# Patient Record
Sex: MALE | Race: WHITE | HISPANIC OR LATINO | URBAN - METROPOLITAN AREA
[De-identification: names, ages, dates, MRNs, and addresses within clinical notes are randomized per-mention and may not be internally consistent; named-entity substitution may affect disease eponyms.]

---

## 2021-01-12 ENCOUNTER — IMMUNIZATION (OUTPATIENT)
Dept: INTERNAL MEDICINE | Facility: CLINIC | Age: 81
End: 2021-01-12
Payer: MEDICAID

## 2021-01-12 DIAGNOSIS — Z23 NEED FOR VACCINATION: ICD-10-CM

## 2021-01-12 PROCEDURE — 91300 COVID-19, MRNA, LNP-S, PF, 30 MCG/0.3 ML DOSE VACCINE: CPT | Mod: PBBFAC | Performed by: INTERNAL MEDICINE

## 2021-02-02 ENCOUNTER — IMMUNIZATION (OUTPATIENT)
Dept: INTERNAL MEDICINE | Facility: CLINIC | Age: 81
End: 2021-02-02
Payer: MEDICAID

## 2021-02-02 DIAGNOSIS — Z23 NEED FOR VACCINATION: Primary | ICD-10-CM

## 2021-02-02 PROCEDURE — 91300 COVID-19, MRNA, LNP-S, PF, 30 MCG/0.3 ML DOSE VACCINE: ICD-10-PCS | Mod: S$GLB,,, | Performed by: INTERNAL MEDICINE

## 2021-02-02 PROCEDURE — 0002A COVID-19, MRNA, LNP-S, PF, 30 MCG/0.3 ML DOSE VACCINE: CPT | Mod: CV19,S$GLB,, | Performed by: INTERNAL MEDICINE

## 2021-02-02 PROCEDURE — 0002A COVID-19, MRNA, LNP-S, PF, 30 MCG/0.3 ML DOSE VACCINE: ICD-10-PCS | Mod: CV19,S$GLB,, | Performed by: INTERNAL MEDICINE

## 2021-02-02 PROCEDURE — 91300 COVID-19, MRNA, LNP-S, PF, 30 MCG/0.3 ML DOSE VACCINE: CPT | Mod: S$GLB,,, | Performed by: INTERNAL MEDICINE

## 2022-10-11 ENCOUNTER — HOSPITAL ENCOUNTER (INPATIENT)
Facility: HOSPITAL | Age: 82
LOS: 8 days | Discharge: HOSPICE | DRG: 193 | End: 2022-10-20
Attending: EMERGENCY MEDICINE | Admitting: HOSPITALIST
Payer: MEDICARE

## 2022-10-11 DIAGNOSIS — R41.82 AMS (ALTERED MENTAL STATUS): ICD-10-CM

## 2022-10-11 DIAGNOSIS — R79.89 ELEVATED TROPONIN: ICD-10-CM

## 2022-10-11 DIAGNOSIS — J18.9 HCAP (HEALTHCARE-ASSOCIATED PNEUMONIA): ICD-10-CM

## 2022-10-11 DIAGNOSIS — R94.31 QT PROLONGATION: ICD-10-CM

## 2022-10-11 DIAGNOSIS — G93.49 ENCEPHALOPATHY DUE TO INFECTION: Primary | ICD-10-CM

## 2022-10-11 DIAGNOSIS — B99.9 ENCEPHALOPATHY DUE TO INFECTION: Primary | ICD-10-CM

## 2022-10-11 DIAGNOSIS — I49.3 PVC (PREMATURE VENTRICULAR CONTRACTION): ICD-10-CM

## 2022-10-11 DIAGNOSIS — J18.9 PNEUMONIA: ICD-10-CM

## 2022-10-11 LAB
ALBUMIN SERPL BCP-MCNC: 3.6 G/DL (ref 3.5–5.2)
ALP SERPL-CCNC: 111 U/L (ref 55–135)
ALT SERPL W/O P-5'-P-CCNC: 23 U/L (ref 10–44)
ANION GAP SERPL CALC-SCNC: 11 MMOL/L (ref 8–16)
AST SERPL-CCNC: 29 U/L (ref 10–40)
BACTERIA #/AREA URNS HPF: ABNORMAL /HPF
BASOPHILS # BLD AUTO: 0.03 K/UL (ref 0–0.2)
BASOPHILS NFR BLD: 0.2 % (ref 0–1.9)
BILIRUB SERPL-MCNC: 0.7 MG/DL (ref 0.1–1)
BILIRUB UR QL STRIP: NEGATIVE
BUN SERPL-MCNC: 53 MG/DL (ref 8–23)
CALCIUM SERPL-MCNC: 10.9 MG/DL (ref 8.7–10.5)
CHLORIDE SERPL-SCNC: 113 MMOL/L (ref 95–110)
CLARITY UR: ABNORMAL
CO2 SERPL-SCNC: 17 MMOL/L (ref 23–29)
COLOR UR: YELLOW
CREAT SERPL-MCNC: 2.2 MG/DL (ref 0.5–1.4)
DIFFERENTIAL METHOD: ABNORMAL
EOSINOPHIL # BLD AUTO: 0 K/UL (ref 0–0.5)
EOSINOPHIL NFR BLD: 0.2 % (ref 0–8)
ERYTHROCYTE [DISTWIDTH] IN BLOOD BY AUTOMATED COUNT: 15.2 % (ref 11.5–14.5)
EST. GFR  (NO RACE VARIABLE): 30 ML/MIN/1.73 M^2
GLUCOSE SERPL-MCNC: 105 MG/DL (ref 70–110)
GLUCOSE UR QL STRIP: NEGATIVE
HCT VFR BLD AUTO: 39.6 % (ref 40–54)
HGB BLD-MCNC: 12.1 G/DL (ref 14–18)
HGB UR QL STRIP: ABNORMAL
HYALINE CASTS #/AREA URNS LPF: 0 /LPF
IMM GRANULOCYTES # BLD AUTO: 0.08 K/UL (ref 0–0.04)
IMM GRANULOCYTES NFR BLD AUTO: 0.5 % (ref 0–0.5)
INFLUENZA A, MOLECULAR: NEGATIVE
INFLUENZA B, MOLECULAR: NEGATIVE
KETONES UR QL STRIP: ABNORMAL
LEUKOCYTE ESTERASE UR QL STRIP: ABNORMAL
LYMPHOCYTES # BLD AUTO: 2 K/UL (ref 1–4.8)
LYMPHOCYTES NFR BLD: 11.9 % (ref 18–48)
MCH RBC QN AUTO: 32.4 PG (ref 27–31)
MCHC RBC AUTO-ENTMCNC: 30.6 G/DL (ref 32–36)
MCV RBC AUTO: 106 FL (ref 82–98)
MICROSCOPIC COMMENT: ABNORMAL
MONOCYTES # BLD AUTO: 1.3 K/UL (ref 0.3–1)
MONOCYTES NFR BLD: 7.7 % (ref 4–15)
NEUTROPHILS # BLD AUTO: 13.4 K/UL (ref 1.8–7.7)
NEUTROPHILS NFR BLD: 79.5 % (ref 38–73)
NITRITE UR QL STRIP: NEGATIVE
NRBC BLD-RTO: 0 /100 WBC
PH UR STRIP: 7 [PH] (ref 5–8)
PLATELET # BLD AUTO: 386 K/UL (ref 150–450)
PMV BLD AUTO: 9.4 FL (ref 9.2–12.9)
POTASSIUM SERPL-SCNC: 5.2 MMOL/L (ref 3.5–5.1)
PROT SERPL-MCNC: 6.9 G/DL (ref 6–8.4)
PROT UR QL STRIP: ABNORMAL
RBC # BLD AUTO: 3.73 M/UL (ref 4.6–6.2)
RBC #/AREA URNS HPF: >100 /HPF (ref 0–4)
SODIUM SERPL-SCNC: 141 MMOL/L (ref 136–145)
SP GR UR STRIP: 1.01 (ref 1–1.03)
SPECIMEN SOURCE: NORMAL
TROPONIN I SERPL DL<=0.01 NG/ML-MCNC: 0.12 NG/ML (ref 0–0.03)
TSH SERPL DL<=0.005 MIU/L-ACNC: 1.41 UIU/ML (ref 0.4–4)
URN SPEC COLLECT METH UR: ABNORMAL
UROBILINOGEN UR STRIP-ACNC: NEGATIVE EU/DL
WBC # BLD AUTO: 16.91 K/UL (ref 3.9–12.7)
WBC #/AREA URNS HPF: >100 /HPF (ref 0–5)

## 2022-10-11 PROCEDURE — G0378 HOSPITAL OBSERVATION PER HR: HCPCS

## 2022-10-11 PROCEDURE — 84443 ASSAY THYROID STIM HORMONE: CPT | Performed by: EMERGENCY MEDICINE

## 2022-10-11 PROCEDURE — 87088 URINE BACTERIA CULTURE: CPT | Performed by: EMERGENCY MEDICINE

## 2022-10-11 PROCEDURE — 93005 ELECTROCARDIOGRAM TRACING: CPT

## 2022-10-11 PROCEDURE — 85025 COMPLETE CBC W/AUTO DIFF WBC: CPT | Performed by: EMERGENCY MEDICINE

## 2022-10-11 PROCEDURE — 87502 INFLUENZA DNA AMP PROBE: CPT | Mod: 59

## 2022-10-11 PROCEDURE — 87077 CULTURE AEROBIC IDENTIFY: CPT | Performed by: EMERGENCY MEDICINE

## 2022-10-11 PROCEDURE — 87040 BLOOD CULTURE FOR BACTERIA: CPT | Performed by: EMERGENCY MEDICINE

## 2022-10-11 PROCEDURE — 93010 ELECTROCARDIOGRAM REPORT: CPT | Mod: 76,,, | Performed by: INTERNAL MEDICINE

## 2022-10-11 PROCEDURE — 96374 THER/PROPH/DIAG INJ IV PUSH: CPT

## 2022-10-11 PROCEDURE — 93010 ELECTROCARDIOGRAM REPORT: CPT | Mod: ,,, | Performed by: INTERNAL MEDICINE

## 2022-10-11 PROCEDURE — 93010 EKG 12-LEAD: ICD-10-PCS | Mod: ,,, | Performed by: INTERNAL MEDICINE

## 2022-10-11 PROCEDURE — 81000 URINALYSIS NONAUTO W/SCOPE: CPT | Performed by: EMERGENCY MEDICINE

## 2022-10-11 PROCEDURE — 84484 ASSAY OF TROPONIN QUANT: CPT | Performed by: EMERGENCY MEDICINE

## 2022-10-11 PROCEDURE — 87186 SC STD MICRODIL/AGAR DIL: CPT | Performed by: EMERGENCY MEDICINE

## 2022-10-11 PROCEDURE — 99285 EMERGENCY DEPT VISIT HI MDM: CPT | Mod: 25

## 2022-10-11 PROCEDURE — 83605 ASSAY OF LACTIC ACID: CPT | Performed by: EMERGENCY MEDICINE

## 2022-10-11 PROCEDURE — 25000003 PHARM REV CODE 250: Performed by: EMERGENCY MEDICINE

## 2022-10-11 PROCEDURE — 80053 COMPREHEN METABOLIC PANEL: CPT | Performed by: EMERGENCY MEDICINE

## 2022-10-11 PROCEDURE — 87502 INFLUENZA DNA AMP PROBE: CPT | Performed by: EMERGENCY MEDICINE

## 2022-10-11 PROCEDURE — 87086 URINE CULTURE/COLONY COUNT: CPT | Performed by: EMERGENCY MEDICINE

## 2022-10-11 RX ORDER — CLINDAMYCIN PHOSPHATE 600 MG/50ML
600 INJECTION, SOLUTION INTRAVENOUS
Status: DISCONTINUED | OUTPATIENT
Start: 2022-10-11 | End: 2022-10-12

## 2022-10-11 RX ADMIN — SODIUM CHLORIDE 1000 ML: 0.9 INJECTION, SOLUTION INTRAVENOUS at 11:10

## 2022-10-12 PROBLEM — I10 HYPERTENSION: Status: ACTIVE | Noted: 2022-10-12

## 2022-10-12 PROBLEM — F31.9 BIPOLAR DISORDER: Status: ACTIVE | Noted: 2022-10-12

## 2022-10-12 PROBLEM — Z86.73 HISTORY OF CVA (CEREBROVASCULAR ACCIDENT): Status: ACTIVE | Noted: 2022-10-12

## 2022-10-12 PROBLEM — G93.49 ENCEPHALOPATHY DUE TO INFECTION: Status: ACTIVE | Noted: 2022-10-12

## 2022-10-12 PROBLEM — J18.9 HCAP (HEALTHCARE-ASSOCIATED PNEUMONIA): Status: ACTIVE | Noted: 2022-10-12

## 2022-10-12 PROBLEM — I48.91 A-FIB: Status: ACTIVE | Noted: 2022-10-12

## 2022-10-12 PROBLEM — R79.89 ELEVATED TROPONIN: Status: ACTIVE | Noted: 2022-10-12

## 2022-10-12 PROBLEM — D72.829 LEUKOCYTOSIS: Status: ACTIVE | Noted: 2022-10-12

## 2022-10-12 PROBLEM — I25.10 CAD (CORONARY ARTERY DISEASE): Status: ACTIVE | Noted: 2022-10-12

## 2022-10-12 PROBLEM — Z86.711 HISTORY OF PULMONARY EMBOLISM: Status: ACTIVE | Noted: 2022-10-12

## 2022-10-12 PROBLEM — R45.851 SUICIDAL IDEATION: Status: ACTIVE | Noted: 2022-10-12

## 2022-10-12 PROBLEM — I50.9 CONGESTIVE HEART FAILURE: Status: ACTIVE | Noted: 2022-10-12

## 2022-10-12 PROBLEM — B99.9 ENCEPHALOPATHY DUE TO INFECTION: Status: ACTIVE | Noted: 2022-10-12

## 2022-10-12 PROBLEM — N39.0 UTI (URINARY TRACT INFECTION): Status: ACTIVE | Noted: 2022-10-12

## 2022-10-12 LAB
AMMONIA PLAS-SCNC: 26 UMOL/L (ref 10–50)
AV INDEX (PROSTH): 0.41
AV MEAN GRADIENT: 11 MMHG
AV PEAK GRADIENT: 19 MMHG
AV VALVE AREA: 1.36 CM2
AV VELOCITY RATIO: 0.37
BSA FOR ECHO PROCEDURE: 1.76 M2
CV ECHO LV RWT: 0.43 CM
DOP CALC AO PEAK VEL: 2.16 M/S
DOP CALC AO VTI: 34.7 CM
DOP CALC LVOT AREA: 3.3 CM2
DOP CALC LVOT DIAMETER: 2.06 CM
DOP CALC LVOT PEAK VEL: 0.79 M/S
DOP CALC LVOT STROKE VOLUME: 47.3 CM3
DOP CALC MV VTI: 37.7 CM
DOP CALCLVOT PEAK VEL VTI: 14.2 CM
ECHO LV POSTERIOR WALL: 0.91 CM (ref 0.6–1.1)
EJECTION FRACTION: 55 %
FRACTIONAL SHORTENING: 35 % (ref 28–44)
INTERVENTRICULAR SEPTUM: 0.66 CM (ref 0.6–1.1)
IVRT: 125.59 MSEC
LA MAJOR: 6.07 CM
LA MINOR: 6.63 CM
LA WIDTH: 3.6 CM
LACTATE SERPL-SCNC: 1 MMOL/L (ref 0.5–2.2)
LEFT ATRIUM SIZE: 3.17 CM
LEFT ATRIUM VOLUME INDEX MOD: 36.7 ML/M2
LEFT ATRIUM VOLUME INDEX: 34.9 ML/M2
LEFT ATRIUM VOLUME MOD: 64.61 CM3
LEFT ATRIUM VOLUME: 61.48 CM3
LEFT INTERNAL DIMENSION IN SYSTOLE: 2.75 CM (ref 2.1–4)
LEFT VENTRICLE DIASTOLIC VOLUME INDEX: 46.05 ML/M2
LEFT VENTRICLE DIASTOLIC VOLUME: 81.04 ML
LEFT VENTRICLE MASS INDEX: 57 G/M2
LEFT VENTRICLE SYSTOLIC VOLUME INDEX: 16 ML/M2
LEFT VENTRICLE SYSTOLIC VOLUME: 28.17 ML
LEFT VENTRICULAR INTERNAL DIMENSION IN DIASTOLE: 4.26 CM (ref 3.5–6)
LEFT VENTRICULAR MASS: 101.14 G
LITHIUM SERPL-SCNC: 1.1 MMOL/L (ref 0.6–1.2)
LVOT MG: 1.59 MMHG
LVOT MV: 0.61 CM/S
MAGNESIUM SERPL-MCNC: 2.3 MG/DL (ref 1.6–2.6)
MV MEAN GRADIENT: 2 MMHG
MV PEAK GRADIENT: 6 MMHG
MV VALVE AREA BY CONTINUITY EQUATION: 1.25 CM2
PHOSPHATE SERPL-MCNC: 3.5 MG/DL (ref 2.7–4.5)
PISA TR MAX VEL: 2.72 M/S
PULM VEIN S/D RATIO: 1.06
PV PEAK D VEL: 0.31 M/S
PV PEAK S VEL: 0.33 M/S
RA MAJOR: 6.05 CM
RA PRESSURE: 15 MMHG
RA WIDTH: 3.7 CM
RIGHT VENTRICULAR END-DIASTOLIC DIMENSION: 2.79 CM
RV TISSUE DOPPLER FREE WALL SYSTOLIC VELOCITY 1 (APICAL 4 CHAMBER VIEW): 0.01 CM/S
STJ: 2.9 CM
TR MAX PG: 30 MMHG
TROPONIN I SERPL DL<=0.01 NG/ML-MCNC: 0.09 NG/ML (ref 0–0.03)
TROPONIN I SERPL DL<=0.01 NG/ML-MCNC: 0.1 NG/ML (ref 0–0.03)
TV REST PULMONARY ARTERY PRESSURE: 45 MMHG

## 2022-10-12 PROCEDURE — U0002 COVID-19 LAB TEST NON-CDC: HCPCS | Performed by: NURSE PRACTITIONER

## 2022-10-12 PROCEDURE — 92610 EVALUATE SWALLOWING FUNCTION: CPT

## 2022-10-12 PROCEDURE — 36415 COLL VENOUS BLD VENIPUNCTURE: CPT | Performed by: NURSE PRACTITIONER

## 2022-10-12 PROCEDURE — 99223 PR INITIAL HOSPITAL CARE,LEVL III: ICD-10-PCS | Mod: ,,, | Performed by: PSYCHIATRY & NEUROLOGY

## 2022-10-12 PROCEDURE — 11000001 HC ACUTE MED/SURG PRIVATE ROOM

## 2022-10-12 PROCEDURE — 25000003 PHARM REV CODE 250: Performed by: NURSE PRACTITIONER

## 2022-10-12 PROCEDURE — 80178 ASSAY OF LITHIUM: CPT | Performed by: PSYCHIATRY & NEUROLOGY

## 2022-10-12 PROCEDURE — 83735 ASSAY OF MAGNESIUM: CPT | Performed by: NURSE PRACTITIONER

## 2022-10-12 PROCEDURE — 84484 ASSAY OF TROPONIN QUANT: CPT | Mod: 91 | Performed by: NURSE PRACTITIONER

## 2022-10-12 PROCEDURE — 99900035 HC TECH TIME PER 15 MIN (STAT)

## 2022-10-12 PROCEDURE — 99223 1ST HOSP IP/OBS HIGH 75: CPT | Mod: ,,, | Performed by: PSYCHIATRY & NEUROLOGY

## 2022-10-12 PROCEDURE — 63600175 PHARM REV CODE 636 W HCPCS: Performed by: NURSE PRACTITIONER

## 2022-10-12 PROCEDURE — 94761 N-INVAS EAR/PLS OXIMETRY MLT: CPT

## 2022-10-12 PROCEDURE — 36415 COLL VENOUS BLD VENIPUNCTURE: CPT | Performed by: PSYCHIATRY & NEUROLOGY

## 2022-10-12 PROCEDURE — 84100 ASSAY OF PHOSPHORUS: CPT | Performed by: NURSE PRACTITIONER

## 2022-10-12 PROCEDURE — 63600175 PHARM REV CODE 636 W HCPCS: Performed by: EMERGENCY MEDICINE

## 2022-10-12 PROCEDURE — 82140 ASSAY OF AMMONIA: CPT | Performed by: PSYCHIATRY & NEUROLOGY

## 2022-10-12 PROCEDURE — 90833 PR PSYCHOTHERAPY W/PATIENT W/E&M, 30 MIN (ADD ON): ICD-10-PCS | Mod: ,,, | Performed by: PSYCHIATRY & NEUROLOGY

## 2022-10-12 PROCEDURE — 90833 PSYTX W PT W E/M 30 MIN: CPT | Mod: ,,, | Performed by: PSYCHIATRY & NEUROLOGY

## 2022-10-12 RX ORDER — MULTIVITAMIN
1 TABLET ORAL DAILY
COMMUNITY

## 2022-10-12 RX ORDER — FINASTERIDE 5 MG/1
5 TABLET, FILM COATED ORAL DAILY
COMMUNITY

## 2022-10-12 RX ORDER — TRAZODONE HYDROCHLORIDE 50 MG/1
25 TABLET ORAL NIGHTLY
COMMUNITY

## 2022-10-12 RX ORDER — ATORVASTATIN CALCIUM 80 MG/1
80 TABLET, FILM COATED ORAL DAILY
COMMUNITY

## 2022-10-12 RX ORDER — LISINOPRIL 40 MG/1
40 TABLET ORAL DAILY
Status: ON HOLD | COMMUNITY
End: 2022-10-20 | Stop reason: HOSPADM

## 2022-10-12 RX ORDER — LANOLIN ALCOHOL/MO/W.PET/CERES
100 CREAM (GRAM) TOPICAL DAILY
COMMUNITY

## 2022-10-12 RX ORDER — DULOXETIN HYDROCHLORIDE 20 MG/1
40 CAPSULE, DELAYED RELEASE ORAL DAILY
COMMUNITY

## 2022-10-12 RX ORDER — NITROGLYCERIN 0.4 MG/1
0.4 TABLET SUBLINGUAL EVERY 5 MIN PRN
COMMUNITY

## 2022-10-12 RX ORDER — ADHESIVE BANDAGE
30 BANDAGE TOPICAL DAILY PRN
COMMUNITY

## 2022-10-12 RX ORDER — MAG HYDROX/ALUMINUM HYD/SIMETH 200-200-20
30 SUSPENSION, ORAL (FINAL DOSE FORM) ORAL EVERY 4 HOURS PRN
COMMUNITY

## 2022-10-12 RX ORDER — CALCIUM CARB/MAGNESIUM CARB 311-232MG
15 TABLET ORAL NIGHTLY
COMMUNITY

## 2022-10-12 RX ORDER — SODIUM CHLORIDE 9 MG/ML
INJECTION, SOLUTION INTRAVENOUS CONTINUOUS
Status: DISCONTINUED | OUTPATIENT
Start: 2022-10-12 | End: 2022-10-13

## 2022-10-12 RX ORDER — IPRATROPIUM BROMIDE AND ALBUTEROL SULFATE 2.5; .5 MG/3ML; MG/3ML
3 SOLUTION RESPIRATORY (INHALATION) EVERY 4 HOURS PRN
Status: DISCONTINUED | OUTPATIENT
Start: 2022-10-12 | End: 2022-10-20 | Stop reason: HOSPADM

## 2022-10-12 RX ORDER — DEXTROMETHORPHAN HYDROBROMIDE, GUAIFENESIN 5; 100 MG/5ML; MG/5ML
650 LIQUID ORAL 4 TIMES DAILY PRN
COMMUNITY

## 2022-10-12 RX ORDER — NITROFURANTOIN 25; 75 MG/1; MG/1
100 CAPSULE ORAL 2 TIMES DAILY
Status: ON HOLD | COMMUNITY
End: 2022-10-13 | Stop reason: ALTCHOICE

## 2022-10-12 RX ORDER — SPIRONOLACTONE 25 MG/1
25 TABLET ORAL DAILY
Status: ON HOLD | COMMUNITY
End: 2022-10-20 | Stop reason: HOSPADM

## 2022-10-12 RX ORDER — AZITHROMYCIN 250 MG/1
500 TABLET, FILM COATED ORAL DAILY
Status: DISCONTINUED | OUTPATIENT
Start: 2022-10-12 | End: 2022-10-13

## 2022-10-12 RX ORDER — OLANZAPINE 10 MG/2ML
2.5 INJECTION, POWDER, FOR SOLUTION INTRAMUSCULAR EVERY 8 HOURS PRN
Status: DISCONTINUED | OUTPATIENT
Start: 2022-10-12 | End: 2022-10-20 | Stop reason: HOSPADM

## 2022-10-12 RX ORDER — CLINDAMYCIN PHOSPHATE 600 MG/50ML
600 INJECTION, SOLUTION INTRAVENOUS
Status: DISCONTINUED | OUTPATIENT
Start: 2022-10-12 | End: 2022-10-12

## 2022-10-12 RX ORDER — METOPROLOL SUCCINATE 25 MG/1
25 TABLET, EXTENDED RELEASE ORAL DAILY
COMMUNITY

## 2022-10-12 RX ADMIN — SODIUM CHLORIDE: 0.9 INJECTION, SOLUTION INTRAVENOUS at 12:10

## 2022-10-12 RX ADMIN — CLINDAMYCIN IN 5 PERCENT DEXTROSE 600 MG: 12 INJECTION, SOLUTION INTRAVENOUS at 06:10

## 2022-10-12 RX ADMIN — CLINDAMYCIN IN 5 PERCENT DEXTROSE 600 MG: 12 INJECTION, SOLUTION INTRAVENOUS at 12:10

## 2022-10-12 RX ADMIN — CEFTRIAXONE 1 G: 1 INJECTION, SOLUTION INTRAVENOUS at 11:10

## 2022-10-12 RX ADMIN — CEFTRIAXONE 1 G: 1 INJECTION, SOLUTION INTRAVENOUS at 12:10

## 2022-10-12 RX ADMIN — SODIUM CHLORIDE: 0.9 INJECTION, SOLUTION INTRAVENOUS at 01:10

## 2022-10-12 NOTE — CARE UPDATE
Patient seen this am. No resp distress noted. Sitter at the bedside. Home meds reconciled. Will hold all home meds 2/2 mental status change. Appreciate Psych's recs. Will follow.   Head CT reviewed.       Jasbir Liu, NP

## 2022-10-12 NOTE — ED PROVIDER NOTES
Encounter Date: 10/11/2022       History     Chief Complaint   Patient presents with    Altered Mental Status     Pt sent from [facility] for eval of AMS. Per Chani at [facility], pt sent for AMS, states that pt has been somnolent on today and had blood work that showed an ALICIA. Chani states that the doctor also noted abnormal lung sounds. Per Chani, pt baseline mentation is oriented to self and sometimes situation only. Pt currently a PEC for SI.       HPI  History limited secondary to patient condition, dementia and altered mentation.  This is a 81 y.o. male who has no past medical history on file.   The patient presents to the Emergency Department with altered mental status.   Patient unable to provide any history.  Per RN, patient normally oriented to self and now is somnolent.    Review of patient's allergies indicates:  No Known Allergies  No past medical history on file.  No past surgical history on file.  No family history on file.     Review of Systems   Unable to perform ROS: Mental status change     Physical Exam     Initial Vitals [10/11/22 2025]   BP Pulse Resp Temp SpO2   102/84 73 20 98 °F (36.7 °C) 99 %      MAP       --         Physical Exam    Nursing note and vitals reviewed.  Constitutional: He appears distressed.   HENT:   Head: Normocephalic and atraumatic.   Dry oropharynx   Eyes: Conjunctivae are normal. Pupils are equal, round, and reactive to light.   Neck: Neck supple.   Normal range of motion.  Cardiovascular:  Normal rate, regular rhythm, normal heart sounds and intact distal pulses.           Pulmonary/Chest: He is in respiratory distress. He has wheezes. He has rales.   Abdominal: Abdomen is soft. Bowel sounds are normal. He exhibits no distension. There is no abdominal tenderness.   Musculoskeletal:         General: No tenderness or edema. Normal range of motion.      Cervical back: Normal range of motion and neck supple.     Lymphadenopathy:     He has no cervical adenopathy.  "  Neurological: He is alert. He has normal strength. GCS score is 15. GCS eye subscore is 4. GCS verbal subscore is 5. GCS motor subscore is 6.   Disoriented, states name only  Follows some commands such as "take a deep breath"   Skin: Skin is warm and dry. Capillary refill takes less than 2 seconds. No rash noted. No erythema.   Psychiatric:   Unable to assess       ED Course   Procedures  Labs Reviewed   CBC W/ AUTO DIFFERENTIAL - Abnormal; Notable for the following components:       Result Value    WBC 16.91 (*)     RBC 3.73 (*)     Hemoglobin 12.1 (*)     Hematocrit 39.6 (*)      (*)     MCH 32.4 (*)     MCHC 30.6 (*)     RDW 15.2 (*)     Gran # (ANC) 13.4 (*)     Immature Grans (Abs) 0.08 (*)     Mono # 1.3 (*)     Gran % 79.5 (*)     Lymph % 11.9 (*)     All other components within normal limits   COMPREHENSIVE METABOLIC PANEL - Abnormal; Notable for the following components:    Potassium 5.2 (*)     Chloride 113 (*)     CO2 17 (*)     BUN 53 (*)     Creatinine 2.2 (*)     Calcium 10.9 (*)     eGFR 30 (*)     All other components within normal limits   TROPONIN I - Abnormal; Notable for the following components:    Troponin I 0.120 (*)     All other components within normal limits   URINALYSIS, REFLEX TO URINE CULTURE - Abnormal; Notable for the following components:    Appearance, UA Cloudy (*)     Protein, UA 1+ (*)     Ketones, UA Trace (*)     Occult Blood UA 3+ (*)     Leukocytes, UA 3+ (*)     All other components within normal limits    Narrative:     Specimen Source->Urine   URINALYSIS MICROSCOPIC - Abnormal; Notable for the following components:    RBC, UA >100 (*)     WBC, UA >100 (*)     Bacteria Moderate (*)     All other components within normal limits    Narrative:     Specimen Source->Urine   INFLUENZA A & B BY MOLECULAR   CULTURE, URINE   CULTURE, BLOOD   CULTURE, BLOOD   TSH     EKG Readings: (Independently Interpreted)   Initial Reading: No STEMI. Ectopy: PVCs. Axis: Left Axis " Deviation.   Paced rhythm at 70 bpm.  Underlying atrial fibrillation noted       Imaging Results              CT Head Without Contrast (Final result)  Result time 10/11/22 22:22:22      Final result by Bel Noguera MD (10/11/22 22:22:22)                   Impression:      No convincing acute intracranial hemorrhage, finding of major arterial acute infarct or mass effect.    Sinus disease in the left maxillary and left posterior ethmoid sinuses.    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    Nonspecific stable deep white matter low density as seen with microvascular chronic ischemic changes.    Please see above for additional incidental nonacute findings.      Electronically signed by: Bel Noguera  Date:    10/11/2022  Time:    22:22               Narrative:    EXAMINATION:  CT HEAD WITHOUT CONTRAST    CLINICAL HISTORY:  Mental status change, unknown cause;    TECHNIQUE:  Low dose axial images were obtained through the head.  Coronal and sagittal reformations were also performed. Contrast was not administered.    COMPARISON:  04/08/2015 CT brain.    FINDINGS:  There is no evidence of acute intracranial intra or extra-axial hemorrhage or hematoma.  The gray-white matter junction differentiation appears to be intact.  There is no focal mass or mass effect.  There is global prominence of the ventricles, cisterns and sulci as seen with senescent atrophic changes similar to prior exam.  Moderate confluent low density in the periventricular white matter is again noted as commonly seen with chronic microvascular ischemic changes.    Remote lacunar infarcts are again noted bilaterally in the basal ganglia.  No appreciable mass or mass effect is seen.  Remote left inferior cerebellar infarct again noted.    There is no evidence of hydrocephalus.    Prominent atherosclerotic calcifications are noted in the distal vertebral and basilar arteries and intracranial internal carotid  arteries bilaterally.    There is complete opacification of the left maxillary sinus and posterior left ethmoid air cells compatible with sinus disease.  Remaining sinuses are clear as are the mastoid air cells and middle ears bilaterally.  Orbital structures grossly appear intact as imaged.  There is motion artifact limiting evaluation through the skull base.  Grossly the bony calvarium is intact with no evidence of fracture.                                       X-Ray Chest AP Portable (Final result)  Result time 10/11/22 21:50:03      Final result by King Miranda MD (10/11/22 21:50:03)                   Impression:      Right basilar airspace opacity.      Electronically signed by: King Miranda MD  Date:    10/11/2022  Time:    21:50               Narrative:    EXAMINATION:  XR CHEST AP PORTABLE    CLINICAL HISTORY:  ams;    TECHNIQUE:  Single frontal view of the chest was performed.    COMPARISON:  04/08/2015.    FINDINGS:  There is a left-sided AICD with its tip overlying the left ventricle.  Monitoring EKG leads are present.    The trachea is unremarkable.  There are calcifications of the aortic knob.  The cardiomediastinal silhouette is within normal limits.  The hemidiaphragms are unremarkable.  There are no pleural effusions.  There is no evidence of a pneumothorax.  There is no evidence of pneumomediastinum.  There is a right basilar airspace opacity.  There are degenerative changes in the osseous structures.                                       Medications   cefTRIAXone (ROCEPHIN) 1 g/50 mL D5W IVPB (has no administration in time range)   clindamycin in D5W 600 mg/50 mL IVPB 600 mg (600 mg Intravenous New Bag 10/12/22 0052)   albuterol-ipratropium 2.5 mg-0.5 mg/3 mL nebulizer solution 3 mL (has no administration in time range)   0.9%  NaCl infusion ( Intravenous New Bag 10/12/22 0050)   cefTRIAXone (ROCEPHIN) 1 g/50 mL D5W IVPB (0 g Intravenous Stopped 10/12/22 0030)   sodium chloride 0.9% bolus 1,000 mL  (0 mLs Intravenous Stopped 10/12/22 0023)     Medical Decision Making:   Initial Assessment:   This is an emergent evaluation of a 81 y.o.male patient with presentation of altered mental status from [facility], on pec for SI.  Patient has rales and wheezing on exam of lungs, more audible on the left side..     Initial differentials include but are not limited to:  Pneumonia, UTI, dehydration, ALICIA, stroke, intracranial hemorrhage, intracranial mass.     Plan:  CBC, CMP, chest x-ray, UA, head CT            Attending Attestation:             Attending ED Notes:   Labs reviewed.  Leukocytosis noted.  ALICIA noted with hyperkalemia. +UTI  Chest x-ray reviewed.  Right basilar opacity, likely aspiration, Hcap  Head CT reviewed.  No acute abnormalities.  Antibiotics ordered for HCAP, UTI.  Will admit to  for further care.                 Clinical Impression:   Final diagnoses:  [R41.82] AMS (altered mental status)  [J18.9] HCAP (healthcare-associated pneumonia)        ED Disposition Condition    Observation                 Sen Cook MD  10/12/22 0059

## 2022-10-12 NOTE — MEDICAL/APP STUDENT
PSYCHIATRY INPATIENT CONSULT NOTE      10/12/2022 8:21 AM   Cheikh Yanez   1940   [id number]           DATE OF ADMISSION: 10/11/2022  8:28 PM    SITE: [facility]    CURRENT LEGAL STATUS: PEC      HISTORY    HPI Per Initial Primary Team  Chief Complaint   Patient presents with    Altered Mental Status       Pt sent from [facility] (formerly [facility]) for eval of AMS. Per Chani at [facility], pt sent for AMS, states that pt has been somnolent on today and had blood work that showed an ALICIA. Chani states that the doctor also noted abnormal lung sounds. Per Chani, pt baseline mentation is oriented to self and sometimes situation only. Pt currently a PEC for SI.           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 Patient unable to provide any history. Per RN, patient normally oriented to self and now is somnolent.  ED findings:  WBCs 16.91, potassium 5.2, creatinine 2.2, troponin 0.120, EKG without ischemic changes, u/a concerning for infectious process,  chest x-ray showed a right basilar airspace opacity, covid pending, blood cultures pending.  Patient admitted to hospital medicine observation unit for further medical management.         Chief Complaint / Reason for Psychiatry Consult: Suicidal Ideation       HPI   Cheikh Yanez is a 81 y.o. male with a past medical history of HFrEF 50%, Afib on eliquis, HTN, PE, CVA and a past psychiatric history of Bipolar 1 disorder, currently being treated by their inpatient primary treatment team for a principal problem of encephalopathy due to infection, healthcare-associated pneumonia and UTI.  Psychiatry was originally consulted as noted above.  The patient was seen and examined.  The chart was reviewed.  On examination today, the patient was alert and oriented to person and city. Unable to assess orientation  further and obtain history due to pt agitation and difficulty w/ speech. Unable to assess patient for delirium.The patient is a transfer from [facility] due to his medical issues. Psych ROS limited due above reason. Unable to assess for any adverse effects to their current medication regimen.  Unable to assess for medical/physical complains. NAD was observed during the examination.  After discussing the risks, benefits, alt vs no treatment, *** is agreeable and desiring a trial of *** in an effort to improve ***.     Collateral:  ***      Psychiatric Review Of Systems - Currently, the patient is endorsing and/or denying the following:   Unable to assess     Symptoms of Depression: diminished mood or loss of interest/anhedonia; irritability, diminished energy, change in sleep, change in appetite, diminished concentration or cognition or indecisiveness, PMA/R, excessive guilt or hopelessness or worthlessness, suicidal ideations    Sleep: initiation, maintenance, early morning awakening with inability to return to sleep    Suicidal/Homicidal ideations: active/passive ideations, organized plans, future intentions    Symptoms of psychosis: hallucinations, delusions, disorganized thinking, disorganized behavior or abnormal motor behavior, or negative symptoms (diminshed emotional expression, avolition, anhedonia, alogia, asociality     Symptoms of becky or hypomania: elevated, expansive, or irritable mood with increased energy or activity; with inflated self-esteem or grandiosity, decreased need for sleep, increased rate of speech, FOI or racing thoughts, distractibility, increased goal directed activity or PMA, risky/disinhibited behavior    Symptoms of BLAIR: excessive anxiety/worry/fear, more days than not, about numerous issues, difficult to control, with restlessness, fatigue, poor concentration, irritability, muscle tension, sleep disturbance; causes functionally impairing distress     Symptoms of Panic  "Disorder: recurrent panic attacks, precipitated or un-precipitated, source of worry and/or behavioral changes secondary; with or without agoraphobia    Symptoms of PTSD: h/o trauma; re-experiencing/intrusive symptoms, avoidant behavior, negative alterations in cognition or mood, or hyperarousal symptoms; with or without dissociative symptoms     Symptoms of OCD: obsessions or compulsions     Symptoms of Eating Disorders: anorexia, bulimia or binging    Substance Use: intoxication, withdrawal, tolerance, used in larger amounts or duration than intended, unsuccessful attempts to limit or quit, increased time engaging in or seeking out, cravings or strong desire to use, failure to fulfill obligations, negative consequences in social/interpersonal/occupational,/recreational areas, use in dangerous situations, medical or psychological consequences       Psychiatric Review Of Systems - Is patient experiencing or having changes in:  Unable to assess     sleep: {YES/NO:54302::"no"}  appetite: {YES/NO:23334::"no"}  weight: {YES/NO:90026::"no"}  energy/anergy: {YES/NO:29120::"no"}  interest/pleasure/anhedonia: {YES/NO:99566::"no"}  somatic symptoms: {YES/NO:58486::"no"}  libido: {YES/NO:71412::"no"}  guilty/hopelessness: {YES/NO:03209::"no"}  concentration: {YES/NO:30433::"no"}  S.I.B.s/risky behavior: {YES/NO:21518::"no"}  SI/SA:  {YES/NO:22499::"no"}    anxiety/panic: {YES/NO:34484::"no"}  Agoraphobia:  {YES/NO:79584::"no"}  Social phobia:  {YES/NO:98280::"no"}  Recurrent nightmares:  {YES/NO:22530::"no"}  hyper startle response:  {YES/NO:57010::"no"}  Avoidance: {YES/NO:00685::"no"}  Recurrent thoughts:  {YES/NO:17844::"no"}  Recurrent behaviors:  {YES/NO:38329::"no"}    Irritability: {YES/NO:68169::"no"}  Racing thoughts: {YES/NO:18581::"no"}  Impulsive behaviors: {YES/NO:18581::"no"}  Pressured speech:  {YES/NO:18581::"no"}    Paranoia:{YES/NO:18581::"no"}  Delusions: " "{YES/NO:96196::"no"}  AVH:{YES/NO:26447::"no"}      PSYCHOTHERAPY ADD-ON +[phone]   30 (16-37*) minutes    Time: *** minutes  Participants: Met with patient    Therapeutic Intervention Type: behavior modifying psychotherapy, supportive psychotherapy  Why chosen therapy is appropriate versus another modality: relevant to diagnosis, patient responds to this modality, evidence based practice    Target symptoms: {PSY TARGET SYMPTOMS:30626}  Primary focus: ***  Psychotherapeutic techniques: supportive and psychodynamic techniques; psycho-education; deep breathing exercises; CBT; problem solving techniques and managing life stressors    Outcome monitoring methods: self-report, observation    Patient's response to intervention:  The patient's response to intervention is {response:19409}.    Progress toward goals:  The patient's progress toward goals is {progress:73843}.      ROS  Unable to assess   General ROS: negative for - chills, fatigue, fever or night sweats  Ophthalmic ROS: negative for - blurry vision, double vision or eye pain  ENT ROS: negative for - sinus pain, headaches, sore throat or visual changes  Allergy and Immunology ROS: negative for - hives, itchy/watery eyes or nasal congestion  Hematological and Lymphatic ROS: negative for - bleeding problems, bruising, jaundice or pallor  Endocrine ROS: negative for - galactorrhea, hot flashes, mood swings, palpitations or temperature intolerance  Respiratory ROS: negative for - cough, hemoptysis, shortness of breath, tachypnea or wheezing  Cardiovascular ROS: negative for - chest pain, dyspnea on exertion, loss of consciousness, palpitations, rapid heart rate or shortness of breath  Gastrointestinal ROS: negative for - appetite loss, nausea, abdominal pain, blood in stools, change in bowel habits, constipation or diarrhea  Genito-Urinary ROS: negative for - incontinence, nocturia or pelvic pain  Musculoskeletal ROS: negative for - joint stiffness, joint swelling, " "joint pain or muscle pain   Neurological ROS: negative for - behavioral changes, confusion, dizziness, memory loss, numbness/tingling or seizures  Dermatological ROS: negative for dry skin, hair changes, pruritus or rash  Psychiatric ROS: see detailed psychiatric ROS above in history section       Past Psychiatric History:  Unable to assess   Previous Medication Trials: {Responses; yes/no/unknown:74}   Previous Psychiatric Hospitalizations: Per chart review, most recent psychiatric hospitalization was at [facility] due to suicidal ideation   Previous Suicide Attempts: {Responses; yes/no/unknown:74}   History of Violence: {Responses; yes/no/unknown:74}  Outpatient Psychiatrist: {Responses; yes/no/unknown:74}    Social History:  Unable to assess   Marital Status: {GEN; MARITAL STATUS:88436}  Children: {NUMBERS 0-12:51094}   Employment Status/Info: {DESC; EMPLOYMENT STATUS:78090}  Education: {misc; education:00547}  Special Ed: {Responses; yes/no/unknown:74}  : {Responses; yes/no/unknown:74}  [employer]: {Responses; yes/no/unknown:74}  Housing Status: {Responses; yes/no/unknown:74}  Hobbies/Leisure time: {Responses; yes/no/unknown:74}  History of phys/sexual abuse: {Responses; yes/no/unknown:74}  Access to gun: {Responses; yes/no/unknown:74}    Family Psychiatric History: {Responses; yes/no/unknown:74}Unable to assess     Substance Abuse History:  Unable to assess   Recreational Drugs: {recreationaldrugs:54694}  Use of Alcohol: {etoh use:88804}  Rehab History:{Responses; yes/no/unknown:74}   Tobacco Use:{Responses; yes/no/unknown:74}  Use of Caffeine: { :49517}  Use of OTC: ***  Legal consequences of chemical use: {no/yes:474978::"no"}    Legal History:  Unable to assess   Past Charges/Incarcerations:{Responses; yes/no/unknown:74}, ***   Pending charges:{Responses; yes/no/unknown:74}     Psychosocial Stressors: { :46977}.   Functioning Relationships: {Psychfxinrelationships:77633}  Strengths AND " Liabilities  {[facility] STRENGTHS/LIABILITIES:14378}  Unable to assess       PAST MEDICAL & SURGICAL HISTORY   No past medical history on file.  No past surgical history on file.    NEUROLOGIC HISTORY  Seizures: ***  Head trauma: ***     FAMILY HISTORY   No family history on file.    ALLERGIES   Review of patient's allergies indicates:  No Known Allergies    CURRENT MEDICATION REGIMEN   Home Meds:   Prior to Admission medications    Not on File       OTC Meds: ***    Scheduled Meds:    azithromycin  500 mg Oral Daily    cefTRIAXone (ROCEPHIN) IVPB  1 g Intravenous Q24H      PRN Meds: albuterol-ipratropium   Psychotherapeutics (From admission, onward)      None            LABORATORY DATA   Recent Results (from the past 72 hour(s))   Urinalysis, Reflex to Urine Culture Urine, Clean Catch    Collection Time: 10/11/22  9:09 PM    Specimen: Urine   Result Value Ref Range    Specimen UA Urine, Catheterized     Color, UA Yellow Yellow, Straw, [place]    Appearance, UA Cloudy (A) Clear    pH, UA 7.0 5.0 - 8.0    Specific Gravity, UA 1.010 1.005 - 1.030    Protein, UA 1+ (A) Negative    Glucose, UA Negative Negative    Ketones, UA Trace (A) Negative    Bilirubin (UA) Negative Negative    Occult Blood UA 3+ (A) Negative    Nitrite, UA Negative Negative    Urobilinogen, UA Negative <2.0 EU/dL    Leukocytes, UA 3+ (A) Negative   Urinalysis Microscopic    Collection Time: 10/11/22  9:09 PM   Result Value Ref Range    RBC, UA >100 (H) 0 - 4 /hpf    WBC, UA >100 (H) 0 - 5 /hpf    Bacteria Moderate (A) None-Occ /hpf    Hyaline Casts, UA 0 0-1/lpf /lpf    Microscopic Comment SEE COMMENT    Influenza A & B by Molecular    Collection Time: 10/11/22  9:10 PM    Specimen: Nasopharyngeal Swab   Result Value Ref Range    Influenza A, Molecular Negative Negative    Influenza B, Molecular Negative Negative    Flu A & B Source NP    CBC auto differential    Collection Time: 10/11/22  9:23 PM   Result Value Ref Range    WBC 16.91 (H) 3.90 - 12.70  K/uL    RBC 3.73 (L) 4.60 - 6.20 M/uL    Hemoglobin 12.1 (L) 14.0 - 18.0 g/dL    Hematocrit 39.6 (L) 40.0 - 54.0 %     (H) 82 - 98 fL    MCH 32.4 (H) 27.0 - 31.0 pg    MCHC 30.6 (L) 32.0 - 36.0 g/dL    RDW 15.2 (H) 11.5 - 14.5 %    Platelets 386 150 - 450 K/uL    MPV 9.4 9.2 - 12.9 fL    Immature Granulocytes 0.5 0.0 - 0.5 %    Gran # (ANC) 13.4 (H) 1.8 - 7.7 K/uL    Immature Grans (Abs) 0.08 (H) 0.00 - 0.04 K/uL    Lymph # 2.0 1.0 - 4.8 K/uL    Mono # 1.3 (H) 0.3 - 1.0 K/uL    Eos # 0.0 0.0 - 0.5 K/uL    Baso # 0.03 0.00 - 0.20 K/uL    nRBC 0 0 /100 WBC    Gran % 79.5 (H) 38.0 - 73.0 %    Lymph % 11.9 (L) 18.0 - 48.0 %    Mono % 7.7 4.0 - 15.0 %    Eosinophil % 0.2 0.0 - 8.0 %    Basophil % 0.2 0.0 - 1.9 %    Differential Method Automated    Comprehensive metabolic panel    Collection Time: 10/11/22  9:23 PM   Result Value Ref Range    Sodium 141 136 - 145 mmol/L    Potassium 5.2 (H) 3.5 - 5.1 mmol/L    Chloride 113 (H) 95 - 110 mmol/L    CO2 17 (L) 23 - 29 mmol/L    Glucose 105 70 - 110 mg/dL    BUN 53 (H) 8 - 23 mg/dL    Creatinine 2.2 (H) 0.5 - 1.4 mg/dL    Calcium 10.9 (H) 8.7 - 10.5 mg/dL    Total Protein 6.9 6.0 - 8.4 g/dL    Albumin 3.6 3.5 - 5.2 g/dL    Total Bilirubin 0.7 0.1 - 1.0 mg/dL    Alkaline Phosphatase 111 55 - 135 U/L    AST 29 10 - 40 U/L    ALT 23 10 - 44 U/L    Anion Gap 11 8 - 16 mmol/L    eGFR 30 (A) >60 mL/min/1.73 m^2   Troponin I    Collection Time: 10/11/22  9:23 PM   Result Value Ref Range    Troponin I 0.120 (H) 0.000 - 0.026 ng/mL   TSH    Collection Time: 10/11/22  9:23 PM   Result Value Ref Range    TSH 1.414 0.400 - 4.000 uIU/mL   Lactic acid, plasma    Collection Time: 10/11/22 11:45 PM   Result Value Ref Range    Lactate (Lactic Acid) 1.0 0.5 - 2.2 mmol/L   Magnesium    Collection Time: 10/12/22  2:37 AM   Result Value Ref Range    Magnesium 2.3 1.6 - 2.6 mg/dL   Phosphorus    Collection Time: 10/12/22  2:37 AM   Result Value Ref Range    Phosphorus 3.5 2.7 - 4.5 mg/dL  "  Troponin I    Collection Time: 10/12/22  4:11 AM   Result Value Ref Range    Troponin I 0.086 (H) 0.000 - 0.026 ng/mL      No results found for: PHENYTOIN, PHENOBARB, VALPROATE, CBMZ      EXAMINATION    VITALS   Vitals:    10/12/22 0037 10/12/22 0450 10/12/22 0731 10/12/22 0820   BP: (!) 145/74 126/86 117/62    BP Location: Right arm Right arm Left arm    Patient Position: Sitting Lying Lying    Pulse: 64 67 64    Resp: 18 20 18    Temp: 97.6 °F (36.4 °C) 96.9 °F (36.1 °C) 96 °F (35.6 °C)    TempSrc: Axillary Axillary Axillary    SpO2: 100% 100% 96% 97%   Weight: 66 kg (145 lb 8.1 oz)      Height: 5' 7" (1.702 m)           CONSTITUTIONAL  General Appearance: NAD, lying in bed, thin and gaunt looking     MUSCULOSKELETAL  Muscle Strength and Tone: weakness and decreased motor function of bilateral upper limbs, did not assess lower limbs   Abnormal Involuntary Movements: intention tremor in hands bilaterally   Gait and Station: did not assess     PSYCHIATRIC   Behavior/Cooperation:  initially cooperative, later agitated and uncooperative   Speech:  difficulty w/ articulation, soft   Language: {EXAM; AMB PSYCH LANGUAGE:20234::"grossly intact"} with spontaneous speech  Mood: {mood:76902}  Affect:  constricted  Associations: *** intact; no ASHWIN  Thought Process: {thought process:00018::"normal and logical"}  Thought Content: {normal:19332::"normal, no suicidality, no homicidality, delusions, or paranoia"}  Sensorium:  Awake/Delirium/Somnolence  Alert and Oriented: to {orientation:78464::"grossly intact"}  Memory: 3/3 immediate, ***/3 at 5 minutes    Recent:  Impaired / Limited / Intact; able to report recent events   Remote:  Impaired / Limited / Intact; Named ***/4 past presidents   Attention/concentration: appropriate for age/education. Able to spell [id number]-r-[id number] & [id number]-r-[id number]   Similarities:  Impaired / Limited / Intact; (difference between apple and orange?)  Abstract reasoning:  Impaired /  Limited / Intact  Insight:  " Impaired /  Limited / Intact  Judgment: Impaired /  Limited / Intact    CAM ICU Delirium Assessment - POSITIVE / NEGATIVE      Is the patient aware of the biomedical complications associated with substance abuse and mental illness? yes    Does the patient have an Advance Directive for Mental Health treatment? no  (If yes, inform patient to bring copy.)      MEDICAL DECISION MAKING    ASSESSMENT      ***     RECOMMENDATIONS       - patient does not currently meet PEC/CEC criteria due to not being an imminent threat to self/others and not being gravely disabled 2/2 mental illness.      - Begin *** (discussed risks/benefits/alt vs no treatment with patient)     - Psychotherapy was performed with patient as noted above      - Please have CM/SW assist patient with outpatient mental health f/u for s/p discharge from this facility      - Patient was instructed to call 911 and return to the nearest ED if he/she begins feeling suicidal, homicidal, or gravely disabled      - Thank you for this consult ; will continue to follow patient         Time spent with patient and/or on unit managing/coordinating patient's care (excluding the time spent on psychotherapy): 70 minutes      More than 50% of the time was spent counseling/coordinating care      STAFF:  Ramez Zarate MD  [facility]  10/12/2022

## 2022-10-12 NOTE — CONSULTS
PSYCHIATRY INPATIENT CONSULT NOTE      10/12/2022 9:33 AM   Cheikh Yanez   1940   [id number]           DATE OF ADMISSION: 10/11/2022  8:28 PM    SITE: [facility]    CURRENT LEGAL STATUS: PEC/CEC (expires 10/16/2022 at 11:04 AM)      HISTORY    Per Initial History from Primary Team:   Chief Complaint:           Patient presents with    Altered Mental Status       Pt sent from [facility] for eval of AMS. Per Chani at [facility], pt sent for AMS, states that pt has been somnolent on today and had blood work that showed an ALICIA. Chani states that the doctor also noted abnormal lung sounds. Per Chani, pt baseline mentation is oriented to self and sometimes situation only. Pt currently a PEC for SI.     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 Patient unable to provide any history. Per RN, patient normally oriented to self and now is somnolent.  ED findings:  WBCs 16.91, potassium 5.2, creatinine 2.2, troponin 0.120, EKG without ischemic changes, u/a concerning for infectious process,  chest x-ray showed a right basilar airspace opacity, covid pending, blood cultures pending.  Patient admitted to hospital medicine observation unit for further medical management.       Chief Complaint / Reason for Psychiatry Consult: AMS / PEC/CEC from [facility]       HPI:   Cheikh Yanez is a 81 y.o. 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  Psychiatry was originally consulted as noted above.  The patient was seen and examined.  The chart was reviewed.  On examination today, the patient was alert and oriented to  "self only.  He was disoriented to name of place, type of place, city, state, month, year, and situation (he thought he was in "[place]").  He was CAM-ICU positive for delirium.  He appeared confused throughout the assessment and intermittently yelled despite not being provoked.  He was able to intermittently make eye contact and was able to intermittently follow some verbal commands.  He denied any current medical/physical complaints, but the validity of this was notably limited due to the patient's AMS / Delirium / Dementia.  NAD was observed during the examination.  Despite multiple attempts, the comprehensive psychiatric assessment was notably limited due to the patient's AMS / Delirium / Dementia.  Psychotherapy was implemented with a focus on improving orientation, confusion, mood / irritability, and behavior.  See detailed psych ROS below. See collateral info below.  See A/P below.      Collateral:   I spoke to the patient's main caregiver (at [phone]).  He states that they live in a double shotgun-style home in the [place], [place].  He states that the patient has a psychiatric hx of Bipolar I Disorder and Dementia.  He states that even at baseline, the patient is often only oriented to person and situation.  He endorses that the patient has had a gradual decline in neurocognition over the past year.  He states that the patient has historically taken Lithium 300 mg PO BID for Bipolar Disorder.  He is unaware of the name of the patient's psychiatrist.  He has been struggling with delirium on top of underlying dementia since a hospital admission at [facility] in August 2022.        Psychiatric Review Of Systems - Currently, the patient is endorsing and/or denying the following:  Attempted but unable to assess due to the patient's AMS / Delirium / Dementia      PSYCHOTHERAPY ADD-ON [phone]   30 (16-37*) minutes    Time: 18 minutes  Participants: Met with patient    Therapeutic Intervention Type: " behavior modifying psychotherapy, supportive psychotherapy  Why chosen therapy is appropriate versus another modality: relevant to diagnosis, patient responds to this modality, evidence based practice    Target symptoms: disorientation, confusion, mood / irritability, and behavioral disturbances   Primary focus: improving orientation, confusion, mood / irritability, and behavior  Psychotherapeutic techniques: supportive and psychodynamic techniques; psycho-education; deep breathing exercises; CBT; problem solving techniques and managing life stressors    Outcome monitoring methods: self-report, observation    Patient's response to intervention:  The patient's response to intervention is limited.    Progress toward goals:  The patient's progress toward goals is limited.        ROS (limited validity due to the patient's AMS / Delirium / Dementia):  General ROS: negative for - chills, fever or night sweats; positive for fatigue  Ophthalmic ROS: negative for - blurry vision, double vision or eye pain  ENT ROS: negative for - sinus pain, headaches, sore throat or visual changes  Allergy and Immunology ROS: negative for - hives, itchy/watery eyes or nasal congestion  Hematological and Lymphatic ROS: negative for - bleeding problems, bruising, jaundice or pallor  Endocrine ROS: negative for - galactorrhea, hot flashes, mood swings, palpitations or temperature intolerance  Respiratory ROS: negative for - cough, hemoptysis, shortness of breath, tachypnea or wheezing  Cardiovascular ROS: negative for - chest pain, dyspnea on exertion, loss of consciousness, palpitations, rapid heart rate or shortness of breath  Gastrointestinal ROS: negative for - appetite loss, nausea, abdominal pain, blood in stools, change in bowel habits, constipation or diarrhea  Genito-Urinary ROS: negative for - incontinence, nocturia or pelvic pain  Musculoskeletal ROS: negative for - joint stiffness, joint swelling, joint pain or muscle pain    Neurological ROS: negative for - dizziness, numbness/tingling or seizures; positive for behavioral changes, confusion, & memory loss  Dermatological ROS: negative for dry skin, hair changes, pruritus or rash  Psychiatric ROS: see detailed psychiatric ROS above in history section       Past Psychiatric History:  Previous Medication Trials: Lithium, Haldol, and other unknown medication(s)  Previous Psychiatric Hospitalizations: yes, transferred here from [facility] [place]   Previous Suicide Attempts: attempted but unable to assess due to patient's AMS / Delirium / Dementia   History of Violence: attempted but unable to assess due to patient's AMS / Delirium / Dementia   Outpatient Psychiatrist: attempted but unable to assess due to patient's AMS / Delirium / Dementia     Social History:  Housing Status: lives in [facility] with caretaker in shotgun-style double home   Access to gun: caretaker denies ; attempted but unable to assess further due to patient's AMS / Delirium / Dementia     Family Psychiatric History: denies     Substance Abuse History:  Recreational Drugs: cannabis per caregiver ; denies other   Use of Alcohol: caregive denies   Tobacco Use: 1-2 PPD per caregiver     Legal History:  attempted but unable to assess due to patient's AMS / Delirium / Dementia    Psychosocial Stressors: health  Functioning Relationships: good support system in caregiver   Strengths AND Liabilities:  Strength: Patient has positive support network., Liability: Patient has poor health., Liability: Patient has poor judgment, Liability: Patient has neurocognitive impairment.      PAST MEDICAL & SURGICAL HISTORY   No past medical history on file.  No past surgical history on file.    NEUROLOGIC HISTORY  Seizures: attempted but unable to assess due to patient's AMS / Delirium / Dementia  Head trauma: attempted but unable to assess due to patient's AMS / Delirium / Dementia  CVA: yes     FAMILY HISTORY   No family history  on file.    ALLERGIES   Review of patient's allergies indicates:  No Known Allergies    CURRENT MEDICATION REGIMEN   Home Meds:   Prior to Admission medications    Not on File       OTC Meds: attempted but unable to assess due to patient's AMS / Delirium / Dementia    Scheduled Meds:    azithromycin  500 mg Oral Daily    cefTRIAXone (ROCEPHIN) IVPB  1 g Intravenous Q24H      PRN Meds: albuterol-ipratropium   Psychotherapeutics (From admission, onward)      None            LABORATORY DATA   Recent Results (from the past 72 hour(s))   Urinalysis, Reflex to Urine Culture Urine, Clean Catch    Collection Time: 10/11/22  9:09 PM    Specimen: Urine   Result Value Ref Range    Specimen UA Urine, Catheterized     Color, UA Yellow Yellow, Straw, [place]    Appearance, UA Cloudy (A) Clear    pH, UA 7.0 5.0 - 8.0    Specific Gravity, UA 1.010 1.005 - 1.030    Protein, UA 1+ (A) Negative    Glucose, UA Negative Negative    Ketones, UA Trace (A) Negative    Bilirubin (UA) Negative Negative    Occult Blood UA 3+ (A) Negative    Nitrite, UA Negative Negative    Urobilinogen, UA Negative <2.0 EU/dL    Leukocytes, UA 3+ (A) Negative   Urinalysis Microscopic    Collection Time: 10/11/22  9:09 PM   Result Value Ref Range    RBC, UA >100 (H) 0 - 4 /hpf    WBC, UA >100 (H) 0 - 5 /hpf    Bacteria Moderate (A) None-Occ /hpf    Hyaline Casts, UA 0 0-1/lpf /lpf    Microscopic Comment SEE COMMENT    Influenza A & B by Molecular    Collection Time: 10/11/22  9:10 PM    Specimen: Nasopharyngeal Swab   Result Value Ref Range    Influenza A, Molecular Negative Negative    Influenza B, Molecular Negative Negative    Flu A & B Source NP    CBC auto differential    Collection Time: 10/11/22  9:23 PM   Result Value Ref Range    WBC 16.91 (H) 3.90 - 12.70 K/uL    RBC 3.73 (L) 4.60 - 6.20 M/uL    Hemoglobin 12.1 (L) 14.0 - 18.0 g/dL    Hematocrit 39.6 (L) 40.0 - 54.0 %     (H) 82 - 98 fL    MCH 32.4 (H) 27.0 - 31.0 pg    MCHC 30.6 (L) 32.0 - 36.0  g/dL    RDW 15.2 (H) 11.5 - 14.5 %    Platelets 386 150 - 450 K/uL    MPV 9.4 9.2 - 12.9 fL    Immature Granulocytes 0.5 0.0 - 0.5 %    Gran # (ANC) 13.4 (H) 1.8 - 7.7 K/uL    Immature Grans (Abs) 0.08 (H) 0.00 - 0.04 K/uL    Lymph # 2.0 1.0 - 4.8 K/uL    Mono # 1.3 (H) 0.3 - 1.0 K/uL    Eos # 0.0 0.0 - 0.5 K/uL    Baso # 0.03 0.00 - 0.20 K/uL    nRBC 0 0 /100 WBC    Gran % 79.5 (H) 38.0 - 73.0 %    Lymph % 11.9 (L) 18.0 - 48.0 %    Mono % 7.7 4.0 - 15.0 %    Eosinophil % 0.2 0.0 - 8.0 %    Basophil % 0.2 0.0 - 1.9 %    Differential Method Automated    Comprehensive metabolic panel    Collection Time: 10/11/22  9:23 PM   Result Value Ref Range    Sodium 141 136 - 145 mmol/L    Potassium 5.2 (H) 3.5 - 5.1 mmol/L    Chloride 113 (H) 95 - 110 mmol/L    CO2 17 (L) 23 - 29 mmol/L    Glucose 105 70 - 110 mg/dL    BUN 53 (H) 8 - 23 mg/dL    Creatinine 2.2 (H) 0.5 - 1.4 mg/dL    Calcium 10.9 (H) 8.7 - 10.5 mg/dL    Total Protein 6.9 6.0 - 8.4 g/dL    Albumin 3.6 3.5 - 5.2 g/dL    Total Bilirubin 0.7 0.1 - 1.0 mg/dL    Alkaline Phosphatase 111 55 - 135 U/L    AST 29 10 - 40 U/L    ALT 23 10 - 44 U/L    Anion Gap 11 8 - 16 mmol/L    eGFR 30 (A) >60 mL/min/1.73 m^2   Troponin I    Collection Time: 10/11/22  9:23 PM   Result Value Ref Range    Troponin I 0.120 (H) 0.000 - 0.026 ng/mL   TSH    Collection Time: 10/11/22  9:23 PM   Result Value Ref Range    TSH 1.414 0.400 - 4.000 uIU/mL   Blood culture    Collection Time: 10/11/22 11:30 PM    Specimen: Peripheral, Hand, Right; Blood   Result Value Ref Range    Blood Culture, Routine No Growth to date    Blood culture    Collection Time: 10/11/22 11:40 PM    Specimen: Peripheral, Hand, Left; Blood   Result Value Ref Range    Blood Culture, Routine No Growth to date    Lactic acid, plasma    Collection Time: 10/11/22 11:45 PM   Result Value Ref Range    Lactate (Lactic Acid) 1.0 0.5 - 2.2 mmol/L   Magnesium    Collection Time: 10/12/22  2:37 AM   Result Value Ref Range    Magnesium  2.3 1.6 - 2.6 mg/dL   Phosphorus    Collection Time: 10/12/22  2:37 AM   Result Value Ref Range    Phosphorus 3.5 2.7 - 4.5 mg/dL   Troponin I    Collection Time: 10/12/22  4:11 AM   Result Value Ref Range    Troponin I 0.086 (H) 0.000 - 0.026 ng/mL   Troponin I    Collection Time: 10/12/22  8:35 AM   Result Value Ref Range    Troponin I 0.101 (H) 0.000 - 0.026 ng/mL   Echo    Collection Time: 10/12/22 10:32 AM   Result Value Ref Range    BSA 1.76 m2    LA WIDTH 3.60 cm    Left Ventricular Outflow Tract Mean Velocity 0.61 cm/s    Left Ventricular Outflow Tract Mean Gradient 1.59 mmHg    LVIDd 4.26 3.5 - 6.0 cm    IVS 0.66 0.6 - 1.1 cm    Posterior Wall 0.91 0.6 - 1.1 cm    LVIDs 2.75 2.1 - 4.0 cm    FS 35 28 - 44 %    LA volume 61.48 cm3    STJ 2.90 cm    LV mass 101.14 g    LA size 3.17 cm    RVDD 2.79 cm    RV S' 0.01 cm/s    Left Ventricle Relative Wall Thickness 0.43 cm    AV mean gradient 11 mmHg    AV valve area 1.36 cm2    AV Velocity Ratio 0.37     AV index (prosthetic) 0.41     MV mean gradient 2 mmHg    MV valve area by continuity eq 1.25 cm2    IVRT 125.59 msec    Pulm vein S/D ratio 1.06     LVOT diameter 2.06 cm    LVOT area 3.3 cm2    LVOT peak dean 0.79 m/s    LVOT peak VTI 14.20 cm    Ao peak dean 2.16 m/s    Ao VTI 34.7 cm    LVOT stroke volume 47.30 cm3    AV peak gradient 19 mmHg    MV peak gradient 6 mmHg    TR Max Dean 2.72 m/s    MV VTI 37.7 cm    PV Peak S Dean 0.33 m/s    PV Peak D Dean 0.31 m/s    LV Systolic Volume 28.17 mL    LV Systolic Volume Index 16.0 mL/m2    LV Diastolic Volume 81.04 mL    LV Diastolic Volume Index 46.05 mL/m2    LA Volume Index 34.9 mL/m2    LV Mass Index 57 g/m2    RA Major Axis 6.05 cm    Left Atrium Minor Axis 6.63 cm    Left Atrium Major Axis 6.07 cm    Triscuspid Valve Regurgitation Peak Gradient 30 mmHg    LA Volume Index (Mod) 36.7 mL/m2    LA volume (mod) 64.61 cm3    RA Width 3.70 cm      No results found for: PHENYTOIN, PHENOBARB, VALPROATE,  "CBMZ      EXAMINATION    VITALS   Vitals:    10/12/22 0731 10/12/22 0820 10/12/22 1032 10/12/22 1132   BP: 117/62   112/70   BP Location: Left arm   Left arm   Patient Position: Lying   Lying   Pulse: 64   62   Resp: 18   18   Temp: 96 °F (35.6 °C)   96 °F (35.6 °C)   TempSrc: Axillary   Oral   SpO2: 96% 97%  96%   Weight:   65.8 kg (145 lb)    Height:   5' 7" (1.702 m)         CONSTITUTIONAL  General Appearance: NAD, unremarkable, age appropriate, disheveled, lying in bed, irritable, confused, normal weight     MUSCULOSKELETAL  Muscle Strength and Tone: attempted but unable to assess due to patient's AMS / Delirium / Dementia  Abnormal Involuntary Movements: mild tremors in BUEs   Gait and Station: attempted but unable to assess due to patient's AMS / Delirium / Dementia    PSYCHIATRIC   Behavior/Cooperation:  reluctant to participate, uncooperative, psychomotor agitation, eye contact minimal  Speech:  slowed, dysarthia, increased latency of response, soft  Language: grossly intact with spontaneous speech  Mood: attempted but unable to assess due to patient's AMS / Delirium / Dementia  Affect:  Irritable   Associations: +ASHWIN  Thought Process: Confused / Disorganized / ASHWIN  Thought Content: attempted but unable to assess due to patient's AMS / Delirium / Dementia  Sensorium:  Awake/Delirium  Alert and Oriented: to self only.  He was disoriented to name of place, type of place, city, state, month, year, and situation (he thought he was in "[place]").  Memory: attempted but unable to assess due to patient's AMS / Delirium / Dementia  Attention/concentration: Impaired / Limited   Similarities: Impaired / Limited   Abstract reasoning: Impaired / Limited  Fund of Knowledge: attempted but unable to assess due to patient's AMS / Delirium / Dementia  Insight: Impaired / Limited  Judgment: Impaired / Limited     CAM ICU Delirium Assessment - POSITIVE     Is the patient aware of the biomedical complications associated with " substance abuse and mental illness?   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    - Once medically cleared / stable, seek transfer back to [facility] for continued mental health treatment / stabilization.      - Discontinue prior Lithium and Haldol at this time (patient with ALICIA on admission) (attempted to discuss risks/benefits/alt vs no treatment with patient).     - Begin Depacon 250 mg IV TID for mood / behavioral disturbances in delirium / dementia (attempted to discuss risks/benefits/alt vs no treatment with patient).    Implement the below DELIRIUM BEHAVIOR MANAGEMENT:  - Minimize use of restraints; if physical restraints necessary, please utilize medical/chemical prns for agitation (can use Zyprexa 2.5 mg to 5 mg PO/IM q8 hours PRN).  - Keep shades open and room lit during day and room dim at night in order to promote healthy circadian rhythms.  - Encourage family at bedside.  - Keep whiteboard in patient's room current with the date and name of the members of patient's team for easy patient self re-orientation.  - Avoid benzodiazepines, antihistamines, anticholinergics, hypnotics, and minimize opiates while controlling for pain as these medications may exacerbate delirium.      - Psychotherapy was performed with patient as noted above with a focus on improving orientation, confusion, mood / irritability, and behavior.    - Patient's most recent resulted labs, imaging, and EKG were reviewed today ;  "CT Head from 10/11/2022 with "global prominence of the ventricles, cisterns and sulci as seen with senescent atrophic changes similar to prior exam.  Moderate confluent low density in the periventricular white matter is again noted as commonly seen with chronic microvascular ischemic changes.  Remote lacunar infarcts are again noted bilaterally in the basal ganglia.  No appreciable mass or mass effect is seen.  Remote left inferior cerebellar infarct again noted."     - Continue to monitor EKG to ensure that QTc remains below 500, especially if patient is requiring any PRN neuroleptic medications.     - Order and f/u Ammonia and Lithium levels.      - Provide Folate / Thiamine / Multi-Vitamin supplementation.     - Continue suicide / violence precautions and continue to monitor the patient with a sitter while on a PEC / CEC.     - Thank you for this consult ; will continue to follow patient         Total time spent with patient and/or managing/coordinating patient's care today (excluding the time spent on psychotherapy): 71 minutes   Time spent on psychotherapy today (as noted above): 18 minutes   Total time for encounter today including psychotherapy: 89 minutes      More than 50% of the time was spent counseling/coordinating care.     Consulting clinician was informed of the encounter and consult note.       STAFF:  Frederick Ramos MD  [facility]   10/12/2022        "

## 2022-10-12 NOTE — ASSESSMENT & PLAN NOTE
HCAP (healthcare-associated pneumonia)  UTI (urinary tract infection)  Leukocytosis    Continuous Cardiac monitoring  Trend troponin   Oxygen prn  duonebs prn  IVFs   Continue IV ceftriaxone and clindamycin   Urine and blood cultures pending  neurochecks

## 2022-10-12 NOTE — HPI
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 Patient unable to provide any history. Per RN, patient normally oriented to self and now is somnolent.  ED findings:  WBCs 16.91, potassium 5.2, creatinine 2.2, troponin 0.120, EKG without ischemic changes, u/a concerning for infectious process,  chest x-ray showed a right basilar airspace opacity, covid pending, blood cultures pending.  Patient admitted to hospital medicine observation unit for further medical management.     Facility Medlist:  atorvastatin 80 mg qhs, finasteride 5 mg qhs, lisinopril 40 mg daily, melatonin 15 mg qhs, metoprolol ER 25 mg daily, MVI daily, spironolactone 25 mg qhs, thiamine 100 mg daily, duloxetine 40 mg daily, eliquis 5 mg bid daily, trazodone 25 mg daily, as needed mag hydroxide, as need maalox, as need tylenol 325 mg q 4 prn, as needed nitro

## 2022-10-12 NOTE — ASSESSMENT & PLAN NOTE
Bipolar disorder    Transferred from [facility] due to medical issues  Stable  PEC in place  Consult Psychiatry for continued management   Awaiting med list from facility

## 2022-10-12 NOTE — ASSESSMENT & PLAN NOTE
Appears euvolemic  Continuous cardiac monitoring  Trend troponins  Echo pending daily weight  Awaiting medslist from facility

## 2022-10-12 NOTE — PT/OT/SLP EVAL
Speech Language Pathology Evaluation  Bedside Swallow    Patient Name:  Cheikh Yanez   MRN:  [id number]  Admitting Diagnosis: Encephalopathy due to infection    Recommendations:                 General Recommendations:  ongoing swallow eval, needs psych eval   Diet recommendations:  NPO, NPO   Aspiration Precautions: oral care as able   General Precautions: Standard, fall, aspiration, NPO  Communication strategies:  confused, yelling out     History per MD     Principal Problem:Encephalopathy due to infection     Chief Complaint:        Chief Complaint   Patient presents with    Altered Mental Status       Pt sent from [facility] for eval of AMS. Per Chani at [facility], pt sent for AMS, states that pt has been somnolent on today and had blood work that showed an ALICIA. Chani states that the doctor also noted abnormal lung sounds. Per Chani, pt baseline mentation is oriented to self and sometimes situation only. Pt currently a PEC for SI.           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 Patient unable to provide any history. Per RN, patient normally oriented to self and now is somnolent.  ED findings:  WBCs 16.91, potassium 5.2, creatinine 2.2, troponin 0.120, EKG without ischemic changes, u/a concerning for infectious process,  chest x-ray showed a right basilar airspace opacity, covid pending, blood cultures pending.  Patient admitted to hospital medicine observation unit for further medical management.            No past medical history on file.    No past surgical history on file.    Social History: unknown    Prior Intubation HX:  n/a    Modified Barium Swallow: none per EMR  CT: No convincing acute intracranial hemorrhage, finding of major arterial acute infarct or mass effect.   Chest X-Rays: Right basilar airspace opacity.  "    Prior diet: unknown.    Subjective     ST Orders for swallow eval received this date. Pt found supine in bed, sitter at bedside  Patient goals: "water"     Pain/Comfort:  Pain Rating 1:  (unable to state)    Respiratory Status: Room air    Objective:   Pt seen in room, sitter reports he has been yelling out on and off.   She cites "he may not participate."  Pt did voice water when asked if he was thirsty.  Does not state personal info or basic orientation.     Oral Musculature Evaluation  Oral Musculature: unable to assess due to poor participation/comprehension  Volitional Cough: not elicited  Volitional Swallow: delayed swallow  Voice Prior to PO Intake: wet voice    Bedside Swallow Eval:   Consistencies Assessed:  Thin liquids ice chips, tsp of water, straw swallow of water  Refused pudding bites     Oral Phase:   Poor oral acceptance  Delayed oral swallow  Oral holding noted    Pharyngeal Phase:   delayed swallow initation  Wet voice is noted after trial of water    Treatment: Pt unable to accept education from SLP due to confusion, not appropriate for oral diet at this time.   Notified primary NP following patient.     Assessment:     Cheikh Yanez is a 81 y.o. male admitted with Encephalopathy due to infection who presents with an SLP diagnosis of confusion, agitated behaviors and not safe for oral diet due to above cognition and limited acceptance.  Consider alternative source of nutrition at this time.     Goals:   Multidisciplinary Problems       SLP Goals          Problem: SLP    Goal Priority Disciplines Outcome   SLP Goal     SLP Ongoing, Progressing                       Plan:     Patient to be seen:  2 x/week, 3 x/week   Plan of Care expires:  11/10/22  Plan of Care reviewed with:  patient   SLP Follow-Up:  Yes       Discharge recommendations:   (needs 24 hour care/psych facility)   Barriers to Discharge:  none    Time Tracking:     SLP Treatment Date:   10/12/22  Speech Start Time:  0903  Speech " Stop Time:  0912     Speech Total Time (min):  9 min    Billable Minutes: Eval Swallow and Oral Function 9    10/12/2022

## 2022-10-12 NOTE — H&P
[facility] Medicine  History & Physical    Patient Name: Cheikh Yanez  MRN: [id number]  Patient Class: OP- Observation  Admission Date: 10/11/2022  Attending Physician: Nohemy Combs MD   Primary Care Provider: No primary care provider on file.         Patient information was obtained from patient and ER records.     Subjective:     Principal Problem:Encephalopathy due to infection    Chief Complaint:   Chief Complaint   Patient presents with    Altered Mental Status     Pt sent from [facility] for eval of AMS. Per Chani at [facility], pt sent for AMS, states that pt has been somnolent on today and had blood work that showed an ALICIA. Chani states that the doctor also noted abnormal lung sounds. Per Chani, pt baseline mentation is oriented to self and sometimes situation only. Pt currently a PEC for SI.          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 Patient unable to provide any history. Per RN, patient normally oriented to self and now is somnolent.  ED findings:  WBCs 16.91, potassium 5.2, creatinine 2.2, troponin 0.120, EKG without ischemic changes, u/a concerning for infectious process,  chest x-ray showed a right basilar airspace opacity, covid pending, blood cultures pending.  Patient admitted to hospital medicine inpatient unit for further medical management.       No past medical history on file.    No past surgical history on file.    Review of patient's allergies indicates:  No Known Allergies    No current facility-administered medications on file prior to encounter.     No current outpatient medications on file prior to encounter.     Family History    None       Tobacco Use    Smoking status: Not on file    Smokeless tobacco: Not on file   Substance and Sexual Activity    Alcohol use: Not on file    Drug  use: Not on file    Sexual activity: Not on file     Review of Systems   Unable to perform ROS: Mental status change   Objective:     Vital Signs (Most Recent):  Temp: 97.6 °F (36.4 °C) (10/12/22 0037)  Pulse: 64 (10/12/22 0037)  Resp: 18 (10/12/22 0037)  BP: (!) 145/74 (10/12/22 0037)  SpO2: 100 % (10/12/22 0037)   Vital Signs (24h Range):  Temp:  [97.6 °F (36.4 °C)-98 °F (36.7 °C)] 97.6 °F (36.4 °C)  Pulse:  [58-73] 64  Resp:  [18-25] 18  SpO2:  [98 %-100 %] 100 %  BP: (102-150)/(70-84) 145/74     Weight: 66 kg (145 lb 8.1 oz)  Body mass index is 22.79 kg/m².    Physical Exam  HENT:      Head: Normocephalic and atraumatic.   Cardiovascular:      Rate and Rhythm: Normal rate.      Pulses: Normal pulses.   Pulmonary:      Effort: No respiratory distress.      Breath sounds: Wheezing present.   Abdominal:      General: Bowel sounds are normal.      Palpations: Abdomen is soft.   Musculoskeletal:         General: No deformity.      Cervical back: Neck supple.   Skin:     General: Skin is warm and dry.   Neurological:      Mental Status: He is alert. He is disoriented.           Significant Labs: All pertinent labs within the past 24 hours have been reviewed.    Significant Imaging: I have reviewed all pertinent imaging results/findings within the past 24 hours.    Assessment/Plan:     * Encephalopathy due to infection  HCAP (healthcare-associated pneumonia)  UTI (urinary tract infection)  Leukocytosis    Continuous Cardiac monitoring  Trend troponin   Oxygen prn  duonebs prn  IVFs   Continue IV ceftriaxone and clindamycin   Urine and blood cultures pending  neurochecks     CAD (coronary artery disease)  Awaiting med list       Congestive heart failure  Appears euvolemic  Continuous cardiac monitoring  Trend troponins  Echo pending daily weight  Awaiting medslist from facility       History of CVA (cerebrovascular accident)  Awaiting medlist from facility       History of pulmonary embolism  No signs of respiratory  distress  Monitor  Awaiting medlist from facility       Hypertension  Stable  Awaiting med list from facility       A-fib  Heart rate controlled  Continuous cardiac monitoring  Awaiting med list from facility         Suicidal ideation  Bipolar disorder    Transferred from [facility] due to medical issues  Stable  PEC in place  Consult Psychiatry for continued management   Awaiting med list from facility       Elevated troponin  Continuous cardiac monitoring  Trend troponins  ekg without ischemic changes         VTE Risk Mitigation (From admission, onward)      None               Caitlin Kenyon NP  Department of Hospital Medicine   [place] - Telemetry

## 2022-10-12 NOTE — PLAN OF CARE
Problem: SLP  Goal: SLP Goal  Outcome: Ongoing, Progressing   Ongoing swallow, not appropriate for PO diet.

## 2022-10-12 NOTE — PLAN OF CARE
"D/C Planning Assessment:    Pt lying in bed with sitter at bedside, disoriented, confused, mumbling unintelligible words, unable to participate in assessment.    Contacted POA Jc Melgar [phone](lives in [place], [place]).  Mr. Melgar shares POA with  Benito Jaquez [phone]  who lives 1 1/2 block from pt.  States pt lives with David Lopez [phone] who has been his tenant and caretaker for past 6 months.  POA states in the past 2 yrs, friends have noticed deterioration in health.  Smokes 2pk cigarettes per day and $300 monthly marijuana habit.  Pt has a hx of Bipolar disorder that usually take lithium for.  David oversees medication administration daily, pt receives individually wrapped from mail order pharmacy.  Mr Alvarez states the over the past few months, pt "lost the ability to walk, run and lives in an almost vegetative state."  He reports pt has fallen a few times in last few weeks prompting to admissions at [facility].  He was transferred to an unknown "rehab" where he was subsequently sent to [facility] for inpt psych treatment.      Pt will remain PEC'd, psychiatry following.  At time of d/c, pt will have support from POA and Mr David Lopez should plan be to return home.  Should pt continue to require inpatient psychiatric care, team can place the d/c order with facility transfer orders for central psych placement either at [facility] return vs placement by centralized team.    Lani Randhawa RN [facility]  Supervisor Case Management-[place]  [phone]     "

## 2022-10-12 NOTE — SUBJECTIVE & OBJECTIVE
No past medical history on file.    No past surgical history on file.    Review of patient's allergies indicates:  No Known Allergies    No current facility-administered medications on file prior to encounter.     No current outpatient medications on file prior to encounter.     Family History    None       Tobacco Use    Smoking status: Not on file    Smokeless tobacco: Not on file   Substance and Sexual Activity    Alcohol use: Not on file    Drug use: Not on file    Sexual activity: Not on file     Review of Systems   Unable to perform ROS: Mental status change   Objective:     Vital Signs (Most Recent):  Temp: 97.6 °F (36.4 °C) (10/12/22 0037)  Pulse: 64 (10/12/22 0037)  Resp: 18 (10/12/22 0037)  BP: (!) 145/74 (10/12/22 0037)  SpO2: 100 % (10/12/22 0037)   Vital Signs (24h Range):  Temp:  [97.6 °F (36.4 °C)-98 °F (36.7 °C)] 97.6 °F (36.4 °C)  Pulse:  [58-73] 64  Resp:  [18-25] 18  SpO2:  [98 %-100 %] 100 %  BP: (102-150)/(70-84) 145/74     Weight: 66 kg (145 lb 8.1 oz)  Body mass index is 22.79 kg/m².    Physical Exam  HENT:      Head: Normocephalic and atraumatic.   Cardiovascular:      Rate and Rhythm: Normal rate.      Pulses: Normal pulses.   Pulmonary:      Effort: No respiratory distress.      Breath sounds: Wheezing present.   Abdominal:      General: Bowel sounds are normal.      Palpations: Abdomen is soft.   Musculoskeletal:         General: No deformity.      Cervical back: Neck supple.   Skin:     General: Skin is warm and dry.   Neurological:      Mental Status: He is alert. He is disoriented.           Significant Labs: All pertinent labs within the past 24 hours have been reviewed.    Significant Imaging: I have reviewed all pertinent imaging results/findings within the past 24 hours.

## 2022-10-13 PROBLEM — E87.0 HYPERNATREMIA: Status: ACTIVE | Noted: 2022-10-13

## 2022-10-13 LAB
ALBUMIN SERPL BCP-MCNC: 3.1 G/DL (ref 3.5–5.2)
ALP SERPL-CCNC: 102 U/L (ref 55–135)
ALT SERPL W/O P-5'-P-CCNC: 22 U/L (ref 10–44)
ANION GAP SERPL CALC-SCNC: 10 MMOL/L (ref 8–16)
ANION GAP SERPL CALC-SCNC: 10 MMOL/L (ref 8–16)
APTT BLDCRRT: 107.6 SEC (ref 21–32)
APTT BLDCRRT: 31.3 SEC (ref 21–32)
AST SERPL-CCNC: 25 U/L (ref 10–40)
BASOPHILS # BLD AUTO: 0.03 K/UL (ref 0–0.2)
BASOPHILS NFR BLD: 0.2 % (ref 0–1.9)
BILIRUB SERPL-MCNC: 0.6 MG/DL (ref 0.1–1)
BUN SERPL-MCNC: 40 MG/DL (ref 8–23)
BUN SERPL-MCNC: 40 MG/DL (ref 8–23)
CALCIUM SERPL-MCNC: 10 MG/DL (ref 8.7–10.5)
CALCIUM SERPL-MCNC: 10 MG/DL (ref 8.7–10.5)
CHLORIDE SERPL-SCNC: 122 MMOL/L (ref 95–110)
CHLORIDE SERPL-SCNC: 122 MMOL/L (ref 95–110)
CO2 SERPL-SCNC: 16 MMOL/L (ref 23–29)
CO2 SERPL-SCNC: 16 MMOL/L (ref 23–29)
CREAT SERPL-MCNC: 1.6 MG/DL (ref 0.5–1.4)
CREAT SERPL-MCNC: 1.6 MG/DL (ref 0.5–1.4)
DIFFERENTIAL METHOD: ABNORMAL
EOSINOPHIL # BLD AUTO: 0.1 K/UL (ref 0–0.5)
EOSINOPHIL NFR BLD: 0.4 % (ref 0–8)
ERYTHROCYTE [DISTWIDTH] IN BLOOD BY AUTOMATED COUNT: 15.5 % (ref 11.5–14.5)
EST. GFR  (NO RACE VARIABLE): 44 ML/MIN/1.73 M^2
EST. GFR  (NO RACE VARIABLE): 44 ML/MIN/1.73 M^2
GLUCOSE SERPL-MCNC: 91 MG/DL (ref 70–110)
GLUCOSE SERPL-MCNC: 91 MG/DL (ref 70–110)
HCT VFR BLD AUTO: 37.6 % (ref 40–54)
HGB BLD-MCNC: 11.5 G/DL (ref 14–18)
IMM GRANULOCYTES # BLD AUTO: 0.07 K/UL (ref 0–0.04)
IMM GRANULOCYTES NFR BLD AUTO: 0.5 % (ref 0–0.5)
INR PPP: 1.2 (ref 0.8–1.2)
LYMPHOCYTES # BLD AUTO: 1.9 K/UL (ref 1–4.8)
LYMPHOCYTES NFR BLD: 12.6 % (ref 18–48)
MAGNESIUM SERPL-MCNC: 2.2 MG/DL (ref 1.6–2.6)
MCH RBC QN AUTO: 32.2 PG (ref 27–31)
MCHC RBC AUTO-ENTMCNC: 30.6 G/DL (ref 32–36)
MCV RBC AUTO: 105 FL (ref 82–98)
MONOCYTES # BLD AUTO: 1.1 K/UL (ref 0.3–1)
MONOCYTES NFR BLD: 7.3 % (ref 4–15)
NEUTROPHILS # BLD AUTO: 12.1 K/UL (ref 1.8–7.7)
NEUTROPHILS NFR BLD: 79 % (ref 38–73)
NRBC BLD-RTO: 0 /100 WBC
PLATELET # BLD AUTO: 381 K/UL (ref 150–450)
PMV BLD AUTO: 10.1 FL (ref 9.2–12.9)
POTASSIUM SERPL-SCNC: 4.1 MMOL/L (ref 3.5–5.1)
POTASSIUM SERPL-SCNC: 4.1 MMOL/L (ref 3.5–5.1)
PROT SERPL-MCNC: 6.1 G/DL (ref 6–8.4)
PROTHROMBIN TIME: 12.3 SEC (ref 9–12.5)
RBC # BLD AUTO: 3.57 M/UL (ref 4.6–6.2)
SARS-COV-2 RDRP RESP QL NAA+PROBE: NEGATIVE
SODIUM SERPL-SCNC: 145 MMOL/L (ref 136–145)
SODIUM SERPL-SCNC: 148 MMOL/L (ref 136–145)
WBC # BLD AUTO: 15.29 K/UL (ref 3.9–12.7)

## 2022-10-13 PROCEDURE — 63600175 PHARM REV CODE 636 W HCPCS: Performed by: PHYSICIAN ASSISTANT

## 2022-10-13 PROCEDURE — 85025 COMPLETE CBC W/AUTO DIFF WBC: CPT | Performed by: NURSE PRACTITIONER

## 2022-10-13 PROCEDURE — 36415 COLL VENOUS BLD VENIPUNCTURE: CPT | Performed by: PHYSICIAN ASSISTANT

## 2022-10-13 PROCEDURE — 25000003 PHARM REV CODE 250: Performed by: PHYSICIAN ASSISTANT

## 2022-10-13 PROCEDURE — 63600175 PHARM REV CODE 636 W HCPCS: Performed by: NURSE PRACTITIONER

## 2022-10-13 PROCEDURE — 90833 PSYTX W PT W E/M 30 MIN: CPT | Mod: ,,, | Performed by: PSYCHIATRY & NEUROLOGY

## 2022-10-13 PROCEDURE — 85730 THROMBOPLASTIN TIME PARTIAL: CPT | Mod: 91 | Performed by: HOSPITALIST

## 2022-10-13 PROCEDURE — 84295 ASSAY OF SERUM SODIUM: CPT | Mod: 91 | Performed by: PHYSICIAN ASSISTANT

## 2022-10-13 PROCEDURE — 83735 ASSAY OF MAGNESIUM: CPT | Performed by: NURSE PRACTITIONER

## 2022-10-13 PROCEDURE — 99233 PR SUBSEQUENT HOSPITAL CARE,LEVL III: ICD-10-PCS | Mod: ,,, | Performed by: PSYCHIATRY & NEUROLOGY

## 2022-10-13 PROCEDURE — 99233 SBSQ HOSP IP/OBS HIGH 50: CPT | Mod: ,,, | Performed by: PSYCHIATRY & NEUROLOGY

## 2022-10-13 PROCEDURE — 85730 THROMBOPLASTIN TIME PARTIAL: CPT | Performed by: PHYSICIAN ASSISTANT

## 2022-10-13 PROCEDURE — 11000001 HC ACUTE MED/SURG PRIVATE ROOM

## 2022-10-13 PROCEDURE — 92526 ORAL FUNCTION THERAPY: CPT

## 2022-10-13 PROCEDURE — 36415 COLL VENOUS BLD VENIPUNCTURE: CPT | Performed by: HOSPITALIST

## 2022-10-13 PROCEDURE — 85610 PROTHROMBIN TIME: CPT | Performed by: PHYSICIAN ASSISTANT

## 2022-10-13 PROCEDURE — 90833 PR PSYCHOTHERAPY W/PATIENT W/E&M, 30 MIN (ADD ON): ICD-10-PCS | Mod: ,,, | Performed by: PSYCHIATRY & NEUROLOGY

## 2022-10-13 PROCEDURE — 80053 COMPREHEN METABOLIC PANEL: CPT | Performed by: NURSE PRACTITIONER

## 2022-10-13 PROCEDURE — 82306 VITAMIN D 25 HYDROXY: CPT | Performed by: PHYSICIAN ASSISTANT

## 2022-10-13 PROCEDURE — 99900035 HC TECH TIME PER 15 MIN (STAT)

## 2022-10-13 PROCEDURE — 94761 N-INVAS EAR/PLS OXIMETRY MLT: CPT

## 2022-10-13 RX ORDER — DEXTROSE MONOHYDRATE 50 MG/ML
INJECTION, SOLUTION INTRAVENOUS CONTINUOUS
Status: DISCONTINUED | OUTPATIENT
Start: 2022-10-13 | End: 2022-10-20 | Stop reason: HOSPADM

## 2022-10-13 RX ORDER — HEPARIN SODIUM,PORCINE/D5W 25000/250
0-40 INTRAVENOUS SOLUTION INTRAVENOUS CONTINUOUS
Status: DISCONTINUED | OUTPATIENT
Start: 2022-10-13 | End: 2022-10-20 | Stop reason: HOSPADM

## 2022-10-13 RX ADMIN — HEPARIN SODIUM 12 UNITS/KG/HR: 10000 INJECTION, SOLUTION INTRAVENOUS at 02:10

## 2022-10-13 RX ADMIN — DEXTROSE: 5 SOLUTION INTRAVENOUS at 09:10

## 2022-10-13 RX ADMIN — AZITHROMYCIN MONOHYDRATE 500 MG: 500 INJECTION, POWDER, LYOPHILIZED, FOR SOLUTION INTRAVENOUS at 11:10

## 2022-10-13 NOTE — ASSESSMENT & PLAN NOTE
No signs of respiratory distress  Monitor  Start low intensity heparin as failed swallow assessment, eliquis on hold

## 2022-10-13 NOTE — MEDICAL/APP STUDENT
PSYCHIATRY INPATIENT PROGRESS NOTE  SUBSEQUENT HOSPITAL VISIT      10/13/2022 9:30 AM   Cheikh Yanez   1940   [id number]           DATE OF ADMISSION: 10/11/2022  8:28 PM    SITE: [facility]    CURRENT LEGAL STATUS: PEC/CEC (expires 10/16/2022 at 11:04 AM)      HISTORY    Per Initial History from Primary Team:   Chief Complaint:               Patient presents with    Altered Mental Status       Pt sent from [facility] for eval of AMS. [facility], states that pt has been somnolent on today and had blood work that showed an ALICIA. Chani states that the doctor also noted abnormal lung sounds. Per Chnai, pt baseline mentation is oriented to self and sometimes situation only. Pt currently a PEC for SI.     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 Patient unable to provide any history. Per RN, patient normally oriented to self and now is somnolent.  ED findings:  WBCs 16.91, potassium 5.2, creatinine 2.2, troponin 0.120, EKG without ischemic changes, u/a concerning for infectious process,  chest x-ray showed a right basilar airspace opacity, covid pending, blood cultures pending.  Patient admitted to hospital medicine observation unit for further medical management.        Chief Complaint / Reason for Psychiatry Consult: AMS / PEC/CEC from [facility]      Psychiatric Interval History (10/13/22)  Cheikh Yanez is a 81 y.o. 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  Psychiatry was originally consulted as noted above.  The patient was seen and examined.  The chart was  "reviewed.  On examination today, the patient remains alert and oriented to self only.  He was disoriented to name of place, type of place, city, state, month, year, and situation. He remains CAM-ICU positive for delirium. He appeared confused on assessment today and intermittently yelled out for "water". He continues to be able to intermittently make eye contact and was able to intermittently follow some verbal commands.  He continues to deny any current medical/physical complaints, but the validity of this was notably limited due to the patient's AMS / Delirium / Dementia.  NAD was observed during the examination.  Similarly to initial assessment yesterday, the comprehensive psychiatric assessment was notably limited due to the patient's AMS / Delirium / Dementia. Patient did not receive Depacon 250 mg IV TID yesterday. Currently holding his lithium and haldol. Psychotherapy was implemented with a focus on improving orientation, confusion, mood / irritability, and behavior.  See detailed psych ROS below. See collateral info below.  See A/P below.       Psychiatric Review Of Systems - Currently, the patient is endorsing and/or denying the following:  Attempted but unable to assess due to the patient's AMS / Delirium / Dementia       PSYCHOTHERAPY ADD-ON +[id number]   30 (16-37*) minutes    Time: *** minutes  Participants: Met with patient    Therapeutic Intervention Type: behavior modifying psychotherapy, supportive psychotherapy  Why chosen therapy is appropriate versus another modality: relevant to diagnosis, patient responds to this modality, evidence based practice    Target symptoms: disorientation, confusion, mood/irritability and behavioral disturbances  Primary focus: improving orientation, confusion, mood/irritability and behavior  Psychotherapeutic techniques: supportive and psychodynamic techniques; psycho-education; deep breathing exercises; CBT; problem solving techniques and managing life " stressors    Outcome monitoring methods: self-report, observation    Patient's response to intervention:  The patient's response to intervention is limited.    Progress toward goals:  The patient's progress toward goals is limited.      ROS (limited validity due to the patient's AMS / Delirium / Dementia):  General ROS: negative for - chills, fatigue, fever or night sweats  Ophthalmic ROS: negative for - blurry vision, double vision or eye pain  ENT ROS: negative for - sinus pain, headaches, sore throat or visual changes  Allergy and Immunology ROS: negative for - hives, itchy/watery eyes or nasal congestion  Hematological and Lymphatic ROS: negative for - bleeding problems, bruising, jaundice or pallor  Endocrine ROS: negative for - galactorrhea, hot flashes, mood swings, palpitations or temperature intolerance  Respiratory ROS: negative for - cough, hemoptysis, shortness of breath, tachypnea or wheezing  Cardiovascular ROS: negative for - chest pain, dyspnea on exertion, loss of consciousness, palpitations, rapid heart rate or shortness of breath  Gastrointestinal ROS: negative for - appetite loss, nausea, abdominal pain, blood in stools, change in bowel habits, constipation or diarrhea  Genito-Urinary ROS: negative for - incontinence, nocturia or pelvic pain  Musculoskeletal ROS: negative for - joint stiffness, joint swelling, joint pain or muscle pain   Neurological ROS: positive for behavior changes, negative for -  confusion, dizziness, memory loss, numbness/tingling or seizures  Dermatological ROS: negative for dry skin, hair changes, pruritus or rash  Psychiatric ROS: see detailed psychiatric ROS above in history section       PAST MEDICAL & SURGICAL HISTORY   No past medical history on file.  No past surgical history on file.    FAMILY HISTORY   No family history on file.    ALLERGIES   Review of patient's allergies indicates:  No Known Allergies    CURRENT MEDICATION REGIMEN   Home Meds:   Prior to  Admission medications    Medication Sig Start Date End Date Taking? Authorizing Provider   apixaban (ELIQUIS) 5 mg Tab Take 5 mg by mouth 2 (two) times daily.   Yes Historical Provider   atorvastatin (LIPITOR) 80 MG tablet Take 80 mg by mouth once daily.   Yes Historical Provider   DULoxetine (CYMBALTA) 20 MG capsule Take 40 mg by mouth once daily.   Yes Historical Provider   finasteride (PROSCAR) 5 mg tablet Take 5 mg by mouth once daily.   Yes Historical Provider   lisinopriL (PRINIVIL,ZESTRIL) 40 MG tablet Take 40 mg by mouth once daily.   Yes Historical Provider   melatonin 5 mg TbDL Take 15 mg by mouth every evening.   Yes Historical Provider   metoprolol succinate (TOPROL-XL) 25 MG 24 hr tablet Take 25 mg by mouth once daily.   Yes Historical Provider   multivitamin (ONE DAILY MULTIVITAMIN) per tablet Take 1 tablet by mouth once daily.   Yes Historical Provider   nitrofurantoin, macrocrystal-monohydrate, (MACROBID) 100 MG capsule Take 100 mg by mouth 2 (two) times daily.   Yes Historical Provider   spironolactone (ALDACTONE) 25 MG tablet Take 25 mg by mouth once daily.   Yes Historical Provider   thiamine (VITAMIN B-1) 100 MG tablet Take 100 mg by mouth once daily.   Yes Historical Provider   traZODone (DESYREL) 50 MG tablet Take 25 mg by mouth every evening.   Yes Historical Provider   acetaminophen (TYLENOL) 650 MG TbSR Take 650 mg by mouth 4 (four) times daily as needed.    Historical Provider   aluminum-magnesium hydroxide-simethicone (MAALOX) 200-200-20 mg/5 mL Susp Take 30 mLs by mouth every 4 (four) hours as needed (GI upset).    Historical Provider   magnesium hydroxide (COOL CHEWS) 311 MG Chew Take 311 mg by mouth every 4 (four) hours as needed.    Historical Provider   magnesium hydroxide 400 mg/5 ml (MILK OF MAGNESIA) 400 mg/5 mL Susp Take 30 mLs by mouth daily as needed (constipation).    Historical Provider   nitroGLYCERIN (NITROSTAT) 0.4 MG SL tablet Place 0.4 mg under the tongue every 5  (five) minutes as needed for Chest pain.    Historical Provider       OTC Meds: ***    Scheduled Meds:    azithromycin  500 mg Oral Daily    cefTRIAXone (ROCEPHIN) IVPB  1 g Intravenous Q24H      PRN Meds: albuterol-ipratropium, OLANZapine   Psychotherapeutics (From admission, onward)      Start     Stop Route Frequency Ordered    10/12/22 1542  OLANZapine injection 2.5 mg         -- IM Every 8 hours PRN 10/12/22 1442            LABORATORY DATA   Recent Results (from the past 72 hour(s))   Urinalysis, Reflex to Urine Culture Urine, Clean Catch    Collection Time: 10/11/22  9:09 PM    Specimen: Urine   Result Value Ref Range    Specimen UA Urine, Catheterized     Color, UA Yellow Yellow, Straw, Mari[place]    Appearance, UA Cloudy (A) Clear    pH, UA 7.0 5.0 - 8.0    Specific Gravity, UA 1.010 1.005 - 1.030    Protein, UA 1+ (A) Negative    Glucose, UA Negative Negative    Ketones, UA Trace (A) Negative    Bilirubin (UA) Negative Negative    Occult Blood UA 3+ (A) Negative    Nitrite, UA Negative Negative    Urobilinogen, UA Negative <2.0 EU/dL    Leukocytes, UA 3+ (A) Negative   Urinalysis Microscopic    Collection Time: 10/11/22  9:09 PM   Result Value Ref Range    RBC, UA >100 (H) 0 - 4 /hpf    WBC, UA >100 (H) 0 - 5 /hpf    Bacteria Moderate (A) None-Occ /hpf    Hyaline Casts, UA 0 0-1/lpf /lpf    Microscopic Comment SEE COMMENT    Urine culture    Collection Time: 10/11/22  9:09 PM    Specimen: Urine   Result Value Ref Range    Urine Culture, Routine       Multiple organisms isolated. None in predominance.  Repeat if    Urine Culture, Routine clinically necessary.    Influenza A & B by Molecular    Collection Time: 10/11/22  9:10 PM    Specimen: Nasopharyngeal Swab   Result Value Ref Range    Influenza A, Molecular Negative Negative    Influenza B, Molecular Negative Negative    Flu A & B Source NP    CBC auto differential    Collection Time: 10/11/22  9:23 PM   Result Value Ref Range    WBC 16.91 (H) 3.90 - 12.70  K/uL    RBC 3.73 (L) 4.60 - 6.20 M/uL    Hemoglobin 12.1 (L) 14.0 - 18.0 g/dL    Hematocrit 39.6 (L) 40.0 - 54.0 %     (H) 82 - 98 fL    MCH 32.4 (H) 27.0 - 31.0 pg    MCHC 30.6 (L) 32.0 - 36.0 g/dL    RDW 15.2 (H) 11.5 - 14.5 %    Platelets 386 150 - 450 K/uL    MPV 9.4 9.2 - 12.9 fL    Immature Granulocytes 0.5 0.0 - 0.5 %    Gran # (ANC) 13.4 (H) 1.8 - 7.7 K/uL    Immature Grans (Abs) 0.08 (H) 0.00 - 0.04 K/uL    Lymph # 2.0 1.0 - 4.8 K/uL    Mono # 1.3 (H) 0.3 - 1.0 K/uL    Eos # 0.0 0.0 - 0.5 K/uL    Baso # 0.03 0.00 - 0.20 K/uL    nRBC 0 0 /100 WBC    Gran % 79.5 (H) 38.0 - 73.0 %    Lymph % 11.9 (L) 18.0 - 48.0 %    Mono % 7.7 4.0 - 15.0 %    Eosinophil % 0.2 0.0 - 8.0 %    Basophil % 0.2 0.0 - 1.9 %    Differential Method Automated    Comprehensive metabolic panel    Collection Time: 10/11/22  9:23 PM   Result Value Ref Range    Sodium 141 136 - 145 mmol/L    Potassium 5.2 (H) 3.5 - 5.1 mmol/L    Chloride 113 (H) 95 - 110 mmol/L    CO2 17 (L) 23 - 29 mmol/L    Glucose 105 70 - 110 mg/dL    BUN 53 (H) 8 - 23 mg/dL    Creatinine 2.2 (H) 0.5 - 1.4 mg/dL    Calcium 10.9 (H) 8.7 - 10.5 mg/dL    Total Protein 6.9 6.0 - 8.4 g/dL    Albumin 3.6 3.5 - 5.2 g/dL    Total Bilirubin 0.7 0.1 - 1.0 mg/dL    Alkaline Phosphatase 111 55 - 135 U/L    AST 29 10 - 40 U/L    ALT 23 10 - 44 U/L    Anion Gap 11 8 - 16 mmol/L    eGFR 30 (A) >60 mL/min/1.73 m^2   Troponin I    Collection Time: 10/11/22  9:23 PM   Result Value Ref Range    Troponin I 0.120 (H) 0.000 - 0.026 ng/mL   TSH    Collection Time: 10/11/22  9:23 PM   Result Value Ref Range    TSH 1.414 0.400 - 4.000 uIU/mL   Blood culture    Collection Time: 10/11/22 11:30 PM    Specimen: Peripheral, Hand, Right; Blood   Result Value Ref Range    Blood Culture, Routine No Growth to date     Blood Culture, Routine No Growth to date    Blood culture    Collection Time: 10/11/22 11:40 PM    Specimen: Peripheral, Hand, Left; Blood   Result Value Ref Range    Blood Culture,  Routine No Growth to date     Blood Culture, Routine No Growth to date    Lactic acid, plasma    Collection Time: 10/11/22 11:45 PM   Result Value Ref Range    Lactate (Lactic Acid) 1.0 0.5 - 2.2 mmol/L   Magnesium    Collection Time: 10/12/22  2:37 AM   Result Value Ref Range    Magnesium 2.3 1.6 - 2.6 mg/dL   Phosphorus    Collection Time: 10/12/22  2:37 AM   Result Value Ref Range    Phosphorus 3.5 2.7 - 4.5 mg/dL   Troponin I    Collection Time: 10/12/22  4:11 AM   Result Value Ref Range    Troponin I 0.086 (H) 0.000 - 0.026 ng/mL   COVID-19 Rapid Screening    Collection Time: 10/12/22  4:49 AM   Result Value Ref Range    SARS-CoV-2 RNA, Amplification, Qual Negative Negative   Troponin I    Collection Time: 10/12/22  8:35 AM   Result Value Ref Range    Troponin I 0.101 (H) 0.000 - 0.026 ng/mL   Echo    Collection Time: 10/12/22 10:32 AM   Result Value Ref Range    BSA 1.76 m2    LA WIDTH 3.60 cm    Left Ventricular Outflow Tract Mean Velocity 0.61 cm/s    Left Ventricular Outflow Tract Mean Gradient 1.59 mmHg    LVIDd 4.26 3.5 - 6.0 cm    IVS 0.66 0.6 - 1.1 cm    Posterior Wall 0.91 0.6 - 1.1 cm    LVIDs 2.75 2.1 - 4.0 cm    FS 35 28 - 44 %    LA volume 61.48 cm3    STJ 2.90 cm    LV mass 101.14 g    LA size 3.17 cm    RVDD 2.79 cm    RV S' 0.01 cm/s    Left Ventricle Relative Wall Thickness 0.43 cm    AV mean gradient 11 mmHg    AV valve area 1.36 cm2    AV Velocity Ratio 0.37     AV index (prosthetic) 0.41     MV mean gradient 2 mmHg    MV valve area by continuity eq 1.25 cm2    IVRT 125.59 msec    Pulm vein S/D ratio 1.06     LVOT diameter 2.06 cm    LVOT area 3.3 cm2    LVOT peak dean 0.79 m/s    LVOT peak VTI 14.20 cm    Ao peak dean 2.16 m/s    Ao VTI 34.7 cm    LVOT stroke volume 47.30 cm3    AV peak gradient 19 mmHg    MV peak gradient 6 mmHg    TR Max Dean 2.72 m/s    MV VTI 37.7 cm    PV Peak S Dean 0.33 m/s    PV Peak D Dean 0.31 m/s    LV Systolic Volume 28.17 mL    LV Systolic Volume Index 16.0 mL/m2     LV Diastolic Volume 81.04 mL    LV Diastolic Volume Index 46.05 mL/m2    LA Volume Index 34.9 mL/m2    LV Mass Index 57 g/m2    RA Major Axis 6.05 cm    Left Atrium Minor Axis 6.63 cm    Left Atrium Major Axis 6.07 cm    Triscuspid Valve Regurgitation Peak Gradient 30 mmHg    LA Volume Index (Mod) 36.7 mL/m2    LA volume (mod) 64.61 cm3    RA Width 3.70 cm    Right Atrial Pressure (from IVC) 15 mmHg    EF 55 %    TV rest pulmonary artery pressure 45 mmHg   Lithium level    Collection Time: 10/12/22 12:27 PM   Result Value Ref Range    Lithium Level 1.1 0.6 - 1.2 mmol/L   Ammonia    Collection Time: 10/12/22 12:27 PM   Result Value Ref Range    Ammonia 26 10 - 50 umol/L   CBC Auto Differential    Collection Time: 10/13/22  2:50 AM   Result Value Ref Range    WBC 15.29 (H) 3.90 - 12.70 K/uL    RBC 3.57 (L) 4.60 - 6.20 M/uL    Hemoglobin 11.5 (L) 14.0 - 18.0 g/dL    Hematocrit 37.6 (L) 40.0 - 54.0 %     (H) 82 - 98 fL    MCH 32.2 (H) 27.0 - 31.0 pg    MCHC 30.6 (L) 32.0 - 36.0 g/dL    RDW 15.5 (H) 11.5 - 14.5 %    Platelets 381 150 - 450 K/uL    MPV 10.1 9.2 - 12.9 fL    Immature Granulocytes 0.5 0.0 - 0.5 %    Gran # (ANC) 12.1 (H) 1.8 - 7.7 K/uL    Immature Grans (Abs) 0.07 (H) 0.00 - 0.04 K/uL    Lymph # 1.9 1.0 - 4.8 K/uL    Mono # 1.1 (H) 0.3 - 1.0 K/uL    Eos # 0.1 0.0 - 0.5 K/uL    Baso # 0.03 0.00 - 0.20 K/uL    nRBC 0 0 /100 WBC    Gran % 79.0 (H) 38.0 - 73.0 %    Lymph % 12.6 (L) 18.0 - 48.0 %    Mono % 7.3 4.0 - 15.0 %    Eosinophil % 0.4 0.0 - 8.0 %    Basophil % 0.2 0.0 - 1.9 %    Differential Method Automated    Basic Metabolic Panel    Collection Time: 10/13/22  2:50 AM   Result Value Ref Range    Sodium 148 (H) 136 - 145 mmol/L    Potassium 4.1 3.5 - 5.1 mmol/L    Chloride 122 (H) 95 - 110 mmol/L    CO2 16 (L) 23 - 29 mmol/L    Glucose 91 70 - 110 mg/dL    BUN 40 (H) 8 - 23 mg/dL    Creatinine 1.6 (H) 0.5 - 1.4 mg/dL    Calcium 10.0 8.7 - 10.5 mg/dL    Anion Gap 10 8 - 16 mmol/L    eGFR 44 (A)  >60 mL/min/1.73 m^2   Magnesium    Collection Time: 10/13/22  2:50 AM   Result Value Ref Range    Magnesium 2.2 1.6 - 2.6 mg/dL   Comprehensive Metabolic Panel    Collection Time: 10/13/22  2:50 AM   Result Value Ref Range    Sodium 148 (H) 136 - 145 mmol/L    Potassium 4.1 3.5 - 5.1 mmol/L    Chloride 122 (H) 95 - 110 mmol/L    CO2 16 (L) 23 - 29 mmol/L    Glucose 91 70 - 110 mg/dL    BUN 40 (H) 8 - 23 mg/dL    Creatinine 1.6 (H) 0.5 - 1.4 mg/dL    Calcium 10.0 8.7 - 10.5 mg/dL    Total Protein 6.1 6.0 - 8.4 g/dL    Albumin 3.1 (L) 3.5 - 5.2 g/dL    Total Bilirubin 0.6 0.1 - 1.0 mg/dL    Alkaline Phosphatase 102 55 - 135 U/L    AST 25 10 - 40 U/L    ALT 22 10 - 44 U/L    Anion Gap 10 8 - 16 mmol/L    eGFR 44 (A) >60 mL/min/1.73 m^2      No results found for: PHENYTOIN, PHENOBARB, VALPROATE, CBMZ      EXAMINATION    VITALS   Vitals:    10/13/22 0356 10/13/22 0430 10/13/22 0742 10/13/22 0838   BP:  (!) 149/75 (!) 154/70    BP Location:       Patient Position:  Lying     Pulse: 78 70 73    Resp:  20 18    Temp:  97.5 °F (36.4 °C) 97.7 °F (36.5 °C)    TempSrc:  Oral Axillary    SpO2:  100%  98%   Weight:  62.9 kg (138 lb 10.7 oz)     Height:            CONSTITUTIONAL  General Appearance: NAD, unremarkable, age appropriate, lying in bed, irritable    MUSCULOSKELETAL  Muscle Strength and Tone: unable to assess  Abnormal Involuntary Movements: mild tremors in UE bilaterally  Gait and Station: unable to assess    PSYCHIATRIC   Behavior/Cooperation:  reluctant to participate, uncooperative, eye contact intermittent  Speech:  dysarthria, slowed, increased latency of response, soft  Language: grossly intact with spontaneous speech  Mood: Unable to assess  Affect:  Irritable  Associations: +ASHWIN  Thought Process: Confused, disorganized  Thought Content: Unable to assess  Sensorium:  Awake/Delirium  Alert and Oriented: to person. Disoriented to place, city, state, month, year, situation  Memory: Unable to  assess  Attention/concentration: Impaired/Limited  Similarities:  Unable to assess  Abstract reasoning:  Unable to assess  Insight: Impaired/ Limited  Judgment: Impaired /  Limited     CAM ICU Delirium Assessment - POSITIVE    Is the patient aware of the biomedical complications associated with substance abuse and mental illness?   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)   Bipolar Affective Disorder   Cannabis Abuse  Tobacco Abuse         RECOMMENDATIONS       - Continue PEC/CEC due to patient being gravely disabled 2/2 mental illness at this time    - Plan for discharge back to [facility] once medically cleared    - Continue to hold Lithium and Haldol due to ALICIA     - Continue delirium behavioral management      - Psychotherapy was performed with patient as noted above      - Please have CM/SW assist patient with outpatient mental health f/u for s/p discharge from this facility      - Patient was instructed to call 911 and return to the nearest ED if he/she begins feeling suicidal, homicidal, or gravely disabled         Time spent with patient and/or on unit managing/coordinating patient's care (excluding the time spent on psychotherapy): 35 minutes      More than 50% of the time was spent counseling/coordinating care      STAFF:  Ramez Zarate MS3  10/13/2022

## 2022-10-13 NOTE — HOSPITAL COURSE
"Pt admitted for AMS, pt sent from [facility].  PEC in place.  UA notable for infection, pt started on ceftriaxone.  Psychiatry consulted, PEC discontinued.  Noted baseline by caregiver described as "vegetative state" and at baseline oriented to person and situation.  It was recommended to hold lithium and haldol with ALICIA.  Depacon recommended however nationwide supply chain issue and no reasonable alternative.  Zyprexa prn agitation.  Folate/Thiamine/MVI.  RD consult as pt failed swallow assessment.  Neurology consult for encephalopathy.  10/13 UC with multiple organisms none in predominance, micro lab will review plate again 10/13.  UC with proteus vulgaris resistant to ceftriaxone and changed to cefepime on 10/15.  Neurology consulted and suspect metabolic encephalopathy.  Nephrology agrees with D5 for treatment of hypernatremia with noted improvement.  ST consulted and recommended NPO.  Cardiology consulted for abnormal rhythm, telemetry reviewed and paced rhythm with underlying afib rate controlled with PVCs and bigeminy; no evidence of afib with RVR or VTach.  Continue IV Metoprolol for now with transition to oral BB at home dose when able to take po meds/fluids.  Palliative Care consulted and pt changed to DNR.  "

## 2022-10-13 NOTE — PT/OT/SLP PROGRESS
Speech Language Pathology Treatment    Patient Name:  Cheikh Yanez   MRN:  [id number]  Admitting Diagnosis: Encephalopathy due to infection    Recommendations:                 General Recommendations:  ongoing assessment  Diet recommendations:  NPO, Liquid Diet Level: NPO   Aspiration Precautions:  oral care as able, use suctioning when needed     General Precautions: Standard, fall, aspiration, NPO, respiratory  Communication strategies:  does not follow commands currently     Subjective     Ongoing swallow assessment at this time, pt with wet respirations and wet breathing noted. Sitter present  Patient goals: speech is unintelligible      Pain/Comfort:  Pain Rating 1:  (does not indicate)    Respiratory Status: Room air    Objective:     Has the patient been evaluated by SLP for swallowing?   Yes  Keep patient NPO? Yes   Current Respiratory Status:    room air     Swallowing: Pt seen this date in room, he is semi-awake and mumbles to touch. Minimal oral motor command following noted. Speech is garbled and unable to make self understood despite verbal cues.   Pt with limited acceptance of ice chip and tsp of water into oral cavity. Mouth remains open when placed in mouth, poor oral acceptance and overall delay in swallow trigger noted. Pt did cough immediately on tsp of water and required oral suctioning in attempts to clear airway and voice. Pt continued to sound wet and no attempts for patient to perform cough reflex to expel material. No further trials given, continue NPO for now. Secure chat sent to MD and NP.   Pt will need alternative source of nutrition and hydration at this time.     Assessment:     Cheikh Yanez is a 81 y.o. male admitted with Encephalopathy due to infection who presents with an SLP diagnosis of audible s/s of airway threat with dysphagia to all trials.   Pt not appropriate for oral diet.     Goals:   Multidisciplinary Problems       SLP Goals          Problem: SLP    Goal Priority  Disciplines Outcome   SLP Goal     SLP Ongoing, Not Progressing                       Plan:     Patient to be seen:  2 x/week, 3 x/week   Plan of Care expires:  11/10/22  Plan of Care reviewed with:  patient   SLP Follow-Up:  Yes       Discharge recommendations:   (needs 24 hour care)   Barriers to Discharge:  poor safety awareness    Time Tracking:     SLP Treatment Date:   10/13/22  Speech Start Time:  1020  Speech Stop Time:  1030     Speech Total Time (min):  10 min    Billable Minutes: Treatment Swallowing Dysfunction 10    10/13/2022

## 2022-10-13 NOTE — ASSESSMENT & PLAN NOTE
Facility medlist: atorvastatin 80 mg qhs, finasteride 5 mg qhs, lisinopril 40 mg daily, melatonin 15 mg qhs, metoprolol ER 25 mg daily, MVI daily, spironolactone 25 mg qhs, thiamine 100 mg daily, duloxetine 40 mg daily, eliquis 5 mg bid daily, trazodone 25 mg daily, as needed mag hydroxide, as need maalox, as need tylenol 325 mg q 4 prn, as needed nitro  -failed swallow study, oral meds on hold

## 2022-10-13 NOTE — ASSESSMENT & PLAN NOTE
HCAP (healthcare-associated pneumonia)  UTI (urinary tract infection)  Leukocytosis    Continuous Cardiac monitoring  Trend troponin, trended down  Oxygen prn  duonebs prn  IVFs   Pt was started on macrobid outpatient on 10/11.  Continue IV ceftriaxone and azithro  -UC multiple organisms in predominance, called micro lab and spoke with Yo Huff, will look at plate again.  -US retroperitoneal: Probable blood products in the urinary bladder.  A mass is not entirely excluded, follow-up is recommended. No hydronephrosis.   BC NGTD  Psychiatry following and recommended Neurology consult, will place.  neurochecks   -failed swallow study, nutrition consult for possible TF

## 2022-10-13 NOTE — ASSESSMENT & PLAN NOTE
Bipolar disorder    Transferred from [facility] due to medical issues  Stable  PEC in place  Consult Psychiatry for continued management   Psych recommended d/c PEC

## 2022-10-13 NOTE — SUBJECTIVE & OBJECTIVE
Interval History:  no acute events overnight, pt lethargic and sitter at bedside.  Pt failed swallow study.  RN this afternoon reports improvement in mentation, answering yes and no questions.    Review of Systems   Unable to perform ROS: Mental status change   Objective:     Vital Signs (Most Recent):  Temp: 96.9 °F (36.1 °C) (10/13/22 1058)  Pulse: 86 (10/13/22 1204)  Resp: 18 (10/13/22 1058)  BP: 121/78 (10/13/22 1058)  SpO2: 99 % (10/13/22 1157) Vital Signs (24h Range):  Temp:  [96.4 °F (35.8 °C)-97.7 °F (36.5 °C)] 96.9 °F (36.1 °C)  Pulse:  [63-92] 86  Resp:  [18-22] 18  SpO2:  [96 %-100 %] 99 %  BP: (121-154)/(65-91) 121/78     Weight: 62.9 kg (138 lb 10.7 oz)  Body mass index is 21.72 kg/m².    Intake/Output Summary (Last 24 hours) at 10/13/2022 1251  Last data filed at 10/13/2022 1204  Gross per 24 hour   Intake 0 ml   Output 1236 ml   Net -1236 ml      Physical Exam  HENT:      Head: Normocephalic and atraumatic.   Cardiovascular:      Rate and Rhythm: Normal rate.      Pulses: Normal pulses.   Pulmonary:      Effort: No respiratory distress.      Breath sounds: No wheezing.   Abdominal:      General: Bowel sounds are normal.      Palpations: Abdomen is soft.   Musculoskeletal:         General: No deformity.      Cervical back: Neck supple.   Skin:     General: Skin is warm and dry.   Neurological:      Mental Status: He is alert. He is disoriented.      GCS: GCS eye subscore is 2. GCS verbal subscore is 3. GCS motor subscore is 3.       Significant Labs: All pertinent labs within the past 24 hours have been reviewed.    Significant Imaging: I have reviewed all pertinent imaging results/findings within the past 24 hours.

## 2022-10-13 NOTE — PLAN OF CARE
Pt not safe for oral diet, poor bolus acceptance and not following commands. Suctioning required to remove oral secretions.

## 2022-10-13 NOTE — PHARMACY MED REC
"[facility]           Pharmacy  Admission Medication History     The home medication history was taken by Ludin Santiago PharmD.      Medication history obtained from Medications listed below were obtained from: SNF/Rehab/LTAC     Based on information gathered for medication list, you may go to "Admission" then "Reconcile Home Medications" tabs to review and/or act upon those items.     The home medication list has been updated by the Pharmacy department.   Please read ALL comments highlighted in yellow.   Please address this information as you see fit.    Feel free to contact us if you have any questions or require assistance.    The medications listed below were removed from the home medication list.  Please reorder if appropriate:    Patient reports NOT TAKING the following medication(s):  macrobid      Potential issues to be addressed PRIOR TO DISCHARGE      No current facility-administered medications on file prior to encounter.     Current Outpatient Medications on File Prior to Encounter   Medication Sig Dispense Refill    acetaminophen (TYLENOL) 650 MG TbSR Take 650 mg by mouth 4 (four) times daily as needed.      aluminum-magnesium hydroxide-simethicone (MAALOX) 200-200-20 mg/5 mL Susp Take 30 mLs by mouth every 4 (four) hours as needed (GI upset).      apixaban (ELIQUIS) 5 mg Tab Take 5 mg by mouth 2 (two) times daily.      atorvastatin (LIPITOR) 80 MG tablet Take 80 mg by mouth once daily.      DULoxetine (CYMBALTA) 20 MG capsule Take 40 mg by mouth once daily.      finasteride (PROSCAR) 5 mg tablet Take 5 mg by mouth once daily.      lisinopriL (PRINIVIL,ZESTRIL) 40 MG tablet Take 40 mg by mouth once daily.      magnesium hydroxide 400 mg/5 ml (MILK OF MAGNESIA) 400 mg/5 mL Susp Take 30 mLs by mouth daily as needed (constipation).      melatonin 5 mg TbDL Take 15 mg by mouth every evening.      metoprolol succinate (TOPROL-XL) 25 MG 24 hr tablet Take 25 mg by mouth once daily.      " multivitamin (THERAGRAN) per tablet Take 1 tablet by mouth once daily.      nitroGLYCERIN (NITROSTAT) 0.4 MG SL tablet Place 0.4 mg under the tongue every 5 (five) minutes as needed for Chest pain.      spironolactone (ALDACTONE) 25 MG tablet Take 25 mg by mouth once daily.      thiamine 100 MG tablet Take 100 mg by mouth once daily.      traZODone (DESYREL) 50 MG tablet Take 25 mg by mouth every evening.      [DISCONTINUED] magnesium hydroxide (COOL CHEWS) 311 MG Chew Take 311 mg by mouth every 4 (four) hours as needed.      [DISCONTINUED] nitrofurantoin, macrocrystal-monohydrate, (MACROBID) 100 MG capsule Take 100 mg by mouth 2 (two) times daily.         Please address this information as you see fit.  Feel free to contact us if you have any questions or require assistance.    Ludin Santiago, PharmD  [phone]                 .

## 2022-10-13 NOTE — ASSESSMENT & PLAN NOTE
Heart rate controlled  Continuous cardiac monitoring  [id number] 5  Pt was on eliquis 5 mg bid, failed swallow study.  Start low intensity heparin drip.    Results for orders placed during the hospital encounter of 10/11/22    Echo    Interpretation Summary  · The left ventricle is normal in size with concentric remodeling and normal systolic function.  · The estimated ejection fraction is 55%.  · A diastolic pattern consistent with atrial fibrillation observed.  · Mild-to-moderate mitral regurgitation.  · There is mild aortic valve stenosis.  · Aortic valve area is 1.36 cm2; peak velocity is 2.16 m/s; mean gradient is 11 mmHg.  · Elevated central venous pressure (15 mmHg).  · The estimated PA systolic pressure is 45 mmHg.  · Mild tricuspid regurgitation.  · Normal right ventricular size with normal right ventricular systolic function.  · There is pulmonary hypertension.

## 2022-10-13 NOTE — PLAN OF CARE
[facility] - Telemetry  Initial Discharge Assessment       Primary Care Provider: Primary Doctor No    Admission Diagnosis: HCAP (healthcare-associated pneumonia) [J18.9]  AMS (altered mental status) [R41.82]  Pneumonia [J18.9]    Admission Date: 10/11/2022  Expected Discharge Date:     Discharge Barriers Identified: (P) None    Payor: MEDICARE / Plan: MEDICARE PART A & B / Product Type: Government /     Extended Emergency Contact Information  Primary Emergency Contact: No,Urology   [place]  Home Phone: [phone]  Relation: Other  Secondary Emergency Contact: Jc Melgar  Mobile Phone: [phone]  Relation: Healthcare Power of    needed? No    Discharge Plan A: (P) Psychiatric hospital       No Pharmacies Listed    Initial Assessment (most recent)       Adult Discharge Assessment - 10/13/22 1125          Discharge Assessment    Assessment Type Discharge Planning Assessment     Source of Information health record     If unable to respond/provide information was family/caregiver contacted? -- (P)      Contact Name/Number Contacted LORI Melgar [phone](lives in [place], [place]).  Mr. Melgar shares POA with  Benito Jaquez [phone]  who lives 1 1/2 block from pt.  States pt lives with David Lopez [phone] who has been his tenant and caretaker for past 6 months (P)      Lives With friend(s)     Do you expect to return to your current living situation? Yes     Do you have help at home or someone to help you manage your care at home? Yes     Who are your caregiver(s) and their phone number(s)? Contacted LORI Melgar [phone](lives in [place], [place]).  Mr. Melgar shares POA with  Benito Jaquez [phone]  who lives 1 1/2 block from pt.  [facility] pt lives with David Lopez [phone] who has been his tenant and caretaker for past 6 months (P)      Prior to hospitilization cognitive status: Not Oriented to Time;Not Oriented to Place (P)      Current  "cognitive status: Not Oriented to Time;Not Oriented to Place (P)      Equipment Currently Used at Home none (P)      Readmission within 30 days? No (P)      Patient currently being followed by outpatient case management? No (P)      Do you currently have service(s) that help you manage your care at home? No (P)      Discharge Plan A Psychiatric hospital (P)      DME Needed Upon Discharge  none (P)      Discharge Barriers Identified None (P)                    No future appointments.    /78   Pulse 75   Temp 96.9 °F (36.1 °C) (Axillary)   Resp 18   Ht 5' 7" (1.702 m)   Wt 62.9 kg (138 lb 10.7 oz)   SpO2 99%   BMI 21.72 kg/m²      azithromycin  500 mg Intravenous Q24H    cefTRIAXone (ROCEPHIN) IVPB  1 g Intravenous Q24H                  "

## 2022-10-13 NOTE — ASSESSMENT & PLAN NOTE
Suspect hypovolemic hypernatremia however pt does have ALICIA and history of lithium use.  Will consult nephrology, possible diabetes insipidus  Lithium level 1.5 on 10/6/22, elevated

## 2022-10-13 NOTE — ASSESSMENT & PLAN NOTE
Appears euvolemic  Continuous cardiac monitoring  Trend troponins, trended down  Facility medlist: atorvastatin 80 mg qhs, lisinopril 40 mg daily, metoprolol succinated ER 25 mg daily, spironolactone 25 mg daily    Results for orders placed during the hospital encounter of 10/11/22    Echo    Interpretation Summary  · The left ventricle is normal in size with concentric remodeling and normal systolic function.  · The estimated ejection fraction is 55%.  · A diastolic pattern consistent with atrial fibrillation observed.  · Mild-to-moderate mitral regurgitation.  · There is mild aortic valve stenosis.  · Aortic valve area is 1.36 cm2; peak velocity is 2.16 m/s; mean gradient is 11 mmHg.  · Elevated central venous pressure (15 mmHg).  · The estimated PA systolic pressure is 45 mmHg.  · Mild tricuspid regurgitation.  · Normal right ventricular size with normal right ventricular systolic function.  · There is pulmonary hypertension.

## 2022-10-13 NOTE — PROGRESS NOTES
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 Patient unable to provide any history. Per RN, patient normally oriented to self and now is somnolent.  ED findings:  WBCs 16.91, potassium 5.2, creatinine 2.2, troponin 0.120, EKG without ischemic changes, u/a concerning for infectious process,  chest x-ray showed a right basilar airspace opacity, covid pending, blood cultures pending.  Patient admitted to hospital medicine observation unit for further medical management.     Facility Medlist:  atorvastatin 80 mg qhs, finasteride 5 mg qhs, lisinopril 40 mg daily, melatonin 15 mg qhs, metoprolol ER 25 mg daily, MVI daily, spironolactone 25 mg qhs, thiamine 100 mg daily, duloxetine 40 mg daily, eliquis 5 mg bid daily, trazodone 25 mg daily, as needed mag hydroxide, as need maalox, as need tylenol 325 mg q 4 prn, as needed nitro      Overview/Hospital Course:  Pt admitted for AMS, pt sent from [facility].  PEC in place.  UA notable for infection, pt started on ceftriaxone.  Psychiatry consulted, PEC discontinued.  It was recommended to hold lithium and haldol with ALICIA.  Depacon recommended however nationwide supply chain issue and no reasonable alternative.  Zyprexa prn agitation.  Folate/Thiamine/MVI.  RD consult as pt failed swallow assessment.  Neurology consult for encephalopathy.  10/13 UC with multiple organisms none in predominance, micro lab will review plate again  10/13.      Interval History:  no acute events overnight, pt lethargic and sitter at bedside.  Pt failed swallow study.  RN this afternoon reports improvement in mentation, answering yes and no questions.    Review of Systems   Unable to perform ROS: Mental status change   Objective:     Vital Signs (Most Recent):  Temp: 96.9 °F (36.1 °C) (10/13/22 1058)  Pulse: 86 (10/13/22 1204)  Resp: 18 (10/13/22 1058)  BP: 121/78 (10/13/22 1058)  SpO2: 99 % (10/13/22 1157) Vital Signs (24h Range):  Temp:  [96.4 °F (35.8 °C)-97.7 °F (36.5 °C)] 96.9 °F (36.1 °C)  Pulse:  [63-92] 86  Resp:  [18-22] 18  SpO2:  [96 %-100 %] 99 %  BP: (121-154)/(65-91) 121/78     Weight: 62.9 kg (138 lb 10.7 oz)  Body mass index is 21.72 kg/m².    Intake/Output Summary (Last 24 hours) at 10/13/2022 1251  Last data filed at 10/13/2022 1204  Gross per 24 hour   Intake 0 ml   Output 1236 ml   Net -1236 ml      Physical Exam  HENT:      Head: Normocephalic and atraumatic.   Cardiovascular:      Rate and Rhythm: Normal rate.      Pulses: Normal pulses.   Pulmonary:      Effort: No respiratory distress.      Breath sounds: No wheezing.   Abdominal:      General: Bowel sounds are normal.      Palpations: Abdomen is soft.   Musculoskeletal:         General: No deformity.      Cervical back: Neck supple.   Skin:     General: Skin is warm and dry.   Neurological:      Mental Status: He is alert. He is disoriented.      GCS: GCS eye subscore is 2. GCS verbal subscore is 3. GCS motor subscore is 3.       Significant Labs: All pertinent labs within the past 24 hours have been reviewed.    Significant Imaging: I have reviewed all pertinent imaging results/findings within the past 24 hours.      Assessment/Plan:      * Encephalopathy due to infection  HCAP (healthcare-associated pneumonia)  UTI (urinary tract infection)  Leukocytosis    Continuous Cardiac monitoring  Trend troponin, trended down  Oxygen prn  duonebs prn  IVFs   Pt was started on macrobid outpatient  on 10/11.  Continue IV ceftriaxone and azithro  -UC multiple organisms in predominance, called micro lab and spoke with Yo Huff, will look at plate again.  -US retroperitoneal: Probable blood products in the urinary bladder.  A mass is not entirely excluded, follow-up is recommended. No hydronephrosis.   BC NGTD  Psychiatry following and recommended Neurology consult, will place.  neurochecks   -failed swallow study, nutrition consult for possible TF    Hypernatremia  Suspect hypovolemic hypernatremia however pt does have ALICIA and history of lithium use.  Will consult nephrology, possible diabetes insipidus  Lithium level 1.5 on 10/6/22, elevated    Congestive heart failure  Appears euvolemic  Continuous cardiac monitoring  Trend troponins, trended down  Facility medlist: atorvastatin 80 mg qhs, lisinopril 40 mg daily, metoprolol succinated ER 25 mg daily, spironolactone 25 mg daily    Results for orders placed during the hospital encounter of 10/11/22    Echo    Interpretation Summary  · The left ventricle is normal in size with concentric remodeling and normal systolic function.  · The estimated ejection fraction is 55%.  · A diastolic pattern consistent with atrial fibrillation observed.  · Mild-to-moderate mitral regurgitation.  · There is mild aortic valve stenosis.  · Aortic valve area is 1.36 cm2; peak velocity is 2.16 m/s; mean gradient is 11 mmHg.  · Elevated central venous pressure (15 mmHg).  · The estimated PA systolic pressure is 45 mmHg.  · Mild tricuspid regurgitation.  · Normal right ventricular size with normal right ventricular systolic function.  · There is pulmonary hypertension.      CAD (coronary artery disease)  See above, resume statin once passes swallow assessment      History of CVA (cerebrovascular accident)  Facility medlist: atorvastatin 80 mg qhs, finasteride 5 mg qhs, lisinopril 40 mg daily, melatonin 15 mg qhs, metoprolol ER 25 mg daily, MVI daily, spironolactone 25 mg qhs,  thiamine 100 mg daily, duloxetine 40 mg daily, eliquis 5 mg bid daily, trazodone 25 mg daily, as needed mag hydroxide, as need maalox, as need tylenol 325 mg q 4 prn, as needed nitro  -failed swallow study, oral meds on hold      A-fib  Heart rate controlled  Continuous cardiac monitoring  [id number]  Pt was on eliquis 5 mg bid, failed swallow study.  Start low intensity heparin drip.    Results for orders placed during the hospital encounter of 10/11/22    Echo    Interpretation Summary  · The left ventricle is normal in size with concentric remodeling and normal systolic function.  · The estimated ejection fraction is 55%.  · A diastolic pattern consistent with atrial fibrillation observed.  · Mild-to-moderate mitral regurgitation.  · There is mild aortic valve stenosis.  · Aortic valve area is 1.36 cm2; peak velocity is 2.16 m/s; mean gradient is 11 mmHg.  · Elevated central venous pressure (15 mmHg).  · The estimated PA systolic pressure is 45 mmHg.  · Mild tricuspid regurgitation.  · Normal right ventricular size with normal right ventricular systolic function.  · There is pulmonary hypertension.          History of pulmonary embolism  No signs of respiratory distress  Monitor  Start low intensity heparin as failed swallow assessment, eliquis on hold      Hypertension  Stable, see medlist above      Suicidal ideation  Bipolar disorder    Transferred from [facility] due to medical issues  Stable  PEC in place  Consult Psychiatry for continued management   Psych recommended d/c PEC      Elevated troponin  Continuous cardiac monitoring  ekg without ischemic changes   Troponin trended down        VTE Risk Mitigation (From admission, onward)         Ordered     heparin 25,000 units in dextrose 5% (100 units/ml) IV bolus from bag - ADDITIONAL PRN BOLUS - 60 units/kg  As needed (PRN)        Question:  Heparin Infusion Adjustment (DO NOT MODIFY ANSWER)  Answer:   [email or web address]\epic\Images\Pharmacy\HeparinInfusions\heparin LOW INTENSITY nomogram for [facility] [id number]    10/13/22 1326     heparin 25,000 units in dextrose 5% (100 units/ml) IV bolus from bag - ADDITIONAL PRN BOLUS - 30 units/kg  As needed (PRN)        Question:  Heparin Infusion Adjustment (DO NOT MODIFY ANSWER)  Answer:  [email or web address]\epic\Images\Pharmacy\HeparinInfusions\heparin LOW INTENSITY nomogram for [facility] [id number]    10/13/22 1326     heparin 25,000 units in dextrose 5% (100 units/ml) IV bolus from bag INITIAL BOLUS  Once        Question:  Heparin Infusion Adjustment (DO NOT MODIFY ANSWER)  Answer:  [email or web address]\epic\Images\Pharmacy\HeparinInfusions\heparin LOW INTENSITY nomogram for [facility] [id number]    10/13/22 1326     heparin 25,000 units in dextrose 5% 250 mL (100 units/mL) infusion LOW INTENSITY nomogram - [facility]  Continuous        Question Answer Comment   Heparin Infusion Adjustment (DO NOT MODIFY ANSWER) [email or web address]\epic\Images\Pharmacy\HeparinInfusions\heparin LOW INTENSITY nomogram for [facility] [id number]    Begin at (in units/kg/hr) 12        10/13/22 1326                Discharge Planning   JAYSON:      Code Status: Full Code   Is the patient medically ready for discharge?:     Reason for patient still in hospital (select all that apply): Patient trending condition and Consult recommendations  Discharge Plan A: [facility]                  Miryam Cadet PA-C  Department of [facility] Medicine   [facility]

## 2022-10-13 NOTE — PROGRESS NOTES
PSYCHIATRY INPATIENT PROGRESS NOTE  SUBSEQUENT HOSPITAL VISIT      10/13/2022 10:01 AM   Cheikh Yanez   1940   [id number]           DATE OF ADMISSION: 10/11/2022  8:28 PM    [facility]    CURRENT LEGAL STATUS: PEC/CEC (expires 10/16/2022 at 11:04 AM)        HISTORY     Per Initial History from Primary Team:   Chief Complaint:               Patient presents with    Altered Mental Status       Pt sent from [facility] ([facility]) for eval of AMS. Per Chani at [facility], pt sent for AMS, states that pt has been somnolent on today and had blood work that showed an ALICIA. Chani states that the doctor also noted abnormal lung sounds. Per Chani, pt baseline mentation is oriented to self and sometimes situation only. Pt currently a PEC for SI.     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 Patient unable to provide any history. Per RN, patient normally oriented to self and now is somnolent.  ED findings:  WBCs 16.91, potassium 5.2, creatinine 2.2, troponin 0.120, EKG without ischemic changes, u/a concerning for infectious process,  chest x-ray showed a right basilar airspace opacity, covid pending, blood cultures pending.  Patient admitted to hospital medicine observation unit for further medical management.   Interval History from Primary Team on 10/12/2022:  Patient seen this am. No resp distress noted. Sitter at the bedside. Home meds reconciled. Will hold all home meds 2/2 mental status change. Appreciate Psych's recs. Will follow.   Head CT reviewed.        Chief Complaint / Reason for Original Psychiatry Consult: AMS / PEC/CEC from [facility]        Subjective / Interval Psychiatric History Today (10/13/2022) (with Psychiatric ROS below):  Cheikh Yanez is a 81 y.o. 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  Psychiatry was originally consulted as noted above.  The patient was seen and examined again today.  The chart was reviewed again today.  On examination today, the patient was somnolent and remains only oriented to self.  He was again disoriented to name of place, type of place, city, state, month, year, and situation.  He was again CAM-ICU positive for delirium.  He continues to appear confused throughout the assessment and intermittently perseverated on the word "water."  Patient unable to elaborate further when trying to discuss this.  He was again able to intermittently make eye contact and was able to intermittently follow some verbal commands.  He again denied any current medical/physical complaints, but the validity of this remains notably limited due to the patient's AMS / Delirium / Dementia.  NAD was observed during the examination.  Despite multiple attempts, the comprehensive psychiatric assessment was again notably limited due to the patient's AMS / Delirium / Dementia.  Psychotherapy was again implemented with a focus on improving orientation, confusion, mood / irritability, and behavior.  See detailed psych ROS below. See collateral info below.  See A/P below.       Collateral:   I spoke to the patient's main caregiver (at [phone]).  He states that they live in a double shotgun-style home in the [place], [place].  He states that the patient has a psychiatric hx of Bipolar I Disorder and Dementia.  He states that even at baseline, the patient is often only oriented to person and situation.  He endorses that the patient has had a gradual decline in neurocognition over the past year.  He states that the patient has historically taken Lithium 300 mg PO BID for Bipolar Disorder.  " "He is unaware of the name of the patient's psychiatrist.  He has been struggling with delirium on top of underlying dementia since a hospital admission at [facility] in August 2022.    Per CM on 10/12/22:  Pt lying in bed with sitter at bedside, disoriented, confused, mumbling unintelligible words, unable to participate in assessment.  Contacted POA Jc Melgar [phone](lives in [place], [place]).  Mr. Melgar shares POA with  Benito Jaquez [phone]  who lives 1 1/2 block from pt.  States pt lives with David Lopez [phone] who has been his tenant and caretaker for past 6 months.  POA states in the past 2 yrs, friends have noticed deterioration in health.  Smokes 2pk cigarettes per day and $300 monthly marijuana habit.  Pt has a hx of Bipolar disorder that usually take lithium for.  David oversees medication administration daily, pt receives individually wrapped from mail order pharmacy.  Mr Alvarez states the over the past few months, pt "lost the ability to walk, run and lives in an almost vegetative state."  He reports pt has fallen a few times in last few weeks prompting to admissions at [facility].  He was transferred to an unknown "rehab" where he was subsequently sent to [facility] for inpt psych treatment.    Pt will remain PEC'd, psychiatry following.  At time of d/c, pt will have support from LORI and Mr David Lopez should plan be to return home.  Should pt continue to require inpatient psychiatric care, team can place the d/c order with facility transfer orders for central psych placement either at [facility] return vs placement by centralized team.        Psychiatric Review Of Systems - Currently, the patient is endorsing and/or denying the following:  Attempted but unable to assess due to the patient's AMS / Delirium / Dementia        PSYCHOTHERAPY ADD-ON [id number]   30 (16-37*) minutes     Time: 17 minutes  Participants: Met with patient     Therapeutic Intervention " Type: behavior modifying psychotherapy, supportive psychotherapy  Why chosen therapy is appropriate versus another modality: relevant to diagnosis, patient responds to this modality, evidence based practice     Target symptoms: disorientation, confusion, mood / irritability, and behavioral disturbances   Primary focus: improving orientation, confusion, mood / irritability, and behavior  Psychotherapeutic techniques: supportive and psychodynamic techniques; psycho-education; reorientation ; behavioral modification ; reality / insight orientation ; problem solving techniques and managing life/medical stressors     Outcome monitoring methods: self-report, observation     Patient's response to intervention:  The patient's response to intervention is limited.     Progress toward goals:  The patient's progress toward goals is limited.          ROS (limited validity due to the patient's AMS / Delirium / Dementia):  General ROS: negative for - chills, fever or night sweats; positive for fatigue  Ophthalmic ROS: negative for - blurry vision, double vision or eye pain  ENT ROS: negative for - sinus pain, headaches, sore throat or visual changes  Allergy and Immunology ROS: negative for - hives, itchy/watery eyes or nasal congestion  Hematological and Lymphatic ROS: negative for - bleeding problems, bruising, jaundice or pallor  Endocrine ROS: negative for - galactorrhea, hot flashes, mood swings, palpitations or temperature intolerance  Respiratory ROS: negative for - cough, hemoptysis, shortness of breath, tachypnea or wheezing  Cardiovascular ROS: negative for - chest pain, dyspnea on exertion, loss of consciousness, palpitations, rapid heart rate or shortness of breath  Gastrointestinal ROS: negative for - appetite loss, nausea, abdominal pain, blood in stools, change in bowel habits, constipation or diarrhea  Genito-Urinary ROS: negative for - incontinence, nocturia or pelvic pain  Musculoskeletal ROS: negative for -  joint stiffness, joint swelling, joint pain or muscle pain   Neurological ROS: negative for - dizziness, numbness/tingling or seizures; positive for behavioral changes, confusion, & memory loss  Dermatological ROS: negative for dry skin, hair changes, pruritus or rash  Psychiatric ROS: see detailed psychiatric ROS above in subjective section       PAST MEDICAL & SURGICAL HISTORY   No past medical history on file.  No past surgical history on file.    NEUROLOGIC HISTORY  Seizures: attempted but unable to assess due to patient's AMS / Delirium / Dementia  Head trauma: attempted but unable to assess due to patient's AMS / Delirium / Dementia  CVA: yes     FAMILY HISTORY   No family history on file.    ALLERGIES   Review of patient's allergies indicates:  No Known Allergies    CURRENT MEDICATION REGIMEN   Home Meds:   Prior to Admission medications    Medication Sig Start Date End Date Taking? Authorizing Provider   apixaban (ELIQUIS) 5 mg Tab Take 5 mg by mouth 2 (two) times daily.   Yes Historical Provider   atorvastatin (LIPITOR) 80 MG tablet Take 80 mg by mouth once daily.   Yes Historical Provider   DULoxetine (CYMBALTA) 20 MG capsule Take 40 mg by mouth once daily.   Yes Historical Provider   finasteride (PROSCAR) 5 mg tablet Take 5 mg by mouth once daily.   Yes Historical Provider   lisinopriL (PRINIVIL,ZESTRIL) 40 MG tablet Take 40 mg by mouth once daily.   Yes Historical Provider   melatonin 5 mg TbDL Take 15 mg by mouth every evening.   Yes Historical Provider   metoprolol succinate (TOPROL-XL) 25 MG 24 hr tablet Take 25 mg by mouth once daily.   Yes Historical Provider   multivitamin (ONE DAILY MULTIVITAMIN) per tablet Take 1 tablet by mouth once daily.   Yes Historical Provider   nitrofurantoin, macrocrystal-monohydrate, (MACROBID) 100 MG capsule Take 100 mg by mouth 2 (two) times daily.   Yes Historical Provider   spironolactone (ALDACTONE) 25 MG tablet Take 25 mg by mouth once daily.   Yes Historical  Provider   thiamine (VITAMIN B-1) 100 MG tablet Take 100 mg by mouth once daily.   Yes Historical Provider   traZODone (DESYREL) 50 MG tablet Take 25 mg by mouth every evening.   Yes Historical Provider   acetaminophen (TYLENOL) 650 MG TbSR Take 650 mg by mouth 4 (four) times daily as needed.    Historical Provider   aluminum-magnesium hydroxide-simethicone (MAALOX) 200-200-20 mg/5 mL Susp Take 30 mLs by mouth every 4 (four) hours as needed (GI upset).    Historical Provider   magnesium hydroxide (COOL CHEWS) 311 MG Chew Take 311 mg by mouth every 4 (four) hours as needed.    Historical Provider   magnesium hydroxide 400 mg/5 ml (MILK OF MAGNESIA) 400 mg/5 mL Susp Take 30 mLs by mouth daily as needed (constipation).    Historical Provider   nitroGLYCERIN (NITROSTAT) 0.4 MG SL tablet Place 0.4 mg under the tongue every 5 (five) minutes as needed for Chest pain.    Historical Provider       Scheduled Meds:    azithromycin  500 mg Intravenous Q24H    cefTRIAXone (ROCEPHIN) IVPB  1 g Intravenous Q24H      PRN Meds: albuterol-ipratropium, OLANZapine   Psychotherapeutics (From admission, onward)      Start     Stop Route Frequency Ordered    10/12/22 1542  OLANZapine injection 2.5 mg         -- IM Every 8 hours PRN 10/12/22 1442            LABORATORY DATA   Recent Results (from the past 72 hour(s))   Urinalysis, Reflex to Urine Culture Urine, Clean Catch    Collection Time: 10/11/22  9:09 PM    Specimen: Urine   Result Value Ref Range    Specimen UA Urine, Catheterized     Color, UA Yellow Yellow, Straw, Mariangel    Appearance, UA Cloudy (A) Clear    pH, UA 7.0 5.0 - 8.0    Specific Gravity, UA 1.010 1.005 - 1.030    Protein, UA 1+ (A) Negative    Glucose, UA Negative Negative    Ketones, UA Trace (A) Negative    Bilirubin (UA) Negative Negative    Occult Blood UA 3+ (A) Negative    Nitrite, UA Negative Negative    Urobilinogen, UA Negative <2.0 EU/dL    Leukocytes, UA 3+ (A) Negative   Urinalysis Microscopic     Collection Time: 10/11/22  9:09 PM   Result Value Ref Range    RBC, UA >100 (H) 0 - 4 /hpf    WBC, UA >100 (H) 0 - 5 /hpf    Bacteria Moderate (A) None-Occ /hpf    Hyaline Casts, UA 0 0-1/lpf /lpf    Microscopic Comment SEE COMMENT    Urine culture    Collection Time: 10/11/22  9:09 PM    Specimen: Urine   Result Value Ref Range    Urine Culture, Routine       Multiple organisms isolated. None in predominance.  Repeat if    Urine Culture, Routine clinically necessary.    Influenza A & B by Molecular    Collection Time: 10/11/22  9:10 PM    Specimen: Nasopharyngeal Swab   Result Value Ref Range    Influenza A, Molecular Negative Negative    Influenza B, Molecular Negative Negative    Flu A & B Source NP    CBC auto differential    Collection Time: 10/11/22  9:23 PM   Result Value Ref Range    WBC 16.91 (H) 3.90 - 12.70 K/uL    RBC 3.73 (L) 4.60 - 6.20 M/uL    Hemoglobin 12.1 (L) 14.0 - 18.0 g/dL    Hematocrit 39.6 (L) 40.0 - 54.0 %     (H) 82 - 98 fL    MCH 32.4 (H) 27.0 - 31.0 pg    MCHC 30.6 (L) 32.0 - 36.0 g/dL    RDW 15.2 (H) 11.5 - 14.5 %    Platelets 386 150 - 450 K/uL    MPV 9.4 9.2 - 12.9 fL    Immature Granulocytes 0.5 0.0 - 0.5 %    Gran # (ANC) 13.4 (H) 1.8 - 7.7 K/uL    Immature Grans (Abs) 0.08 (H) 0.00 - 0.04 K/uL    Lymph # 2.0 1.0 - 4.8 K/uL    Mono # 1.3 (H) 0.3 - 1.0 K/uL    Eos # 0.0 0.0 - 0.5 K/uL    Baso # 0.03 0.00 - 0.20 K/uL    nRBC 0 0 /100 WBC    Gran % 79.5 (H) 38.0 - 73.0 %    Lymph % 11.9 (L) 18.0 - 48.0 %    Mono % 7.7 4.0 - 15.0 %    Eosinophil % 0.2 0.0 - 8.0 %    Basophil % 0.2 0.0 - 1.9 %    Differential Method Automated    Comprehensive metabolic panel    Collection Time: 10/11/22  9:23 PM   Result Value Ref Range    Sodium 141 136 - 145 mmol/L    Potassium 5.2 (H) 3.5 - 5.1 mmol/L    Chloride 113 (H) 95 - 110 mmol/L    CO2 17 (L) 23 - 29 mmol/L    Glucose 105 70 - 110 mg/dL    BUN 53 (H) 8 - 23 mg/dL    Creatinine 2.2 (H) 0.5 - 1.4 mg/dL    Calcium 10.9 (H) 8.7 - 10.5 mg/dL     Total Protein 6.9 6.0 - 8.4 g/dL    Albumin 3.6 3.5 - 5.2 g/dL    Total Bilirubin 0.7 0.1 - 1.0 mg/dL    Alkaline Phosphatase 111 55 - 135 U/L    AST 29 10 - 40 U/L    ALT 23 10 - 44 U/L    Anion Gap 11 8 - 16 mmol/L    eGFR 30 (A) >60 mL/min/1.73 m^2   Troponin I    Collection Time: 10/11/22  9:23 PM   Result Value Ref Range    Troponin I 0.120 (H) 0.000 - 0.026 ng/mL   TSH    Collection Time: 10/11/22  9:23 PM   Result Value Ref Range    TSH 1.414 0.400 - 4.000 uIU/mL   Blood culture    Collection Time: 10/11/22 11:30 PM    Specimen: Peripheral, Hand, Right; Blood   Result Value Ref Range    Blood Culture, Routine No Growth to date     Blood Culture, Routine No Growth to date    Blood culture    Collection Time: 10/11/22 11:40 PM    Specimen: Peripheral, Hand, Left; Blood   Result Value Ref Range    Blood Culture, Routine No Growth to date     Blood Culture, Routine No Growth to date    Lactic acid, plasma    Collection Time: 10/11/22 11:45 PM   Result Value Ref Range    Lactate (Lactic Acid) 1.0 0.5 - 2.2 mmol/L   Magnesium    Collection Time: 10/12/22  2:37 AM   Result Value Ref Range    Magnesium 2.3 1.6 - 2.6 mg/dL   Phosphorus    Collection Time: 10/12/22  2:37 AM   Result Value Ref Range    Phosphorus 3.5 2.7 - 4.5 mg/dL   Troponin I    Collection Time: 10/12/22  4:11 AM   Result Value Ref Range    Troponin I 0.086 (H) 0.000 - 0.026 ng/mL   COVID-19 Rapid Screening    Collection Time: 10/12/22  4:49 AM   Result Value Ref Range    SARS-CoV-2 RNA, Amplification, Qual Negative Negative   Troponin I    Collection Time: 10/12/22  8:35 AM   Result Value Ref Range    Troponin I 0.101 (H) 0.000 - 0.026 ng/mL   Echo    Collection Time: 10/12/22 10:32 AM   Result Value Ref Range    BSA 1.76 m2    LA WIDTH 3.60 cm    Left Ventricular Outflow Tract Mean Velocity 0.61 cm/s    Left Ventricular Outflow Tract Mean Gradient 1.59 mmHg    LVIDd 4.26 3.5 - 6.0 cm    IVS 0.66 0.6 - 1.1 cm    Posterior Wall 0.91 0.6 - 1.1 cm     LVIDs 2.75 2.1 - 4.0 cm    FS 35 28 - 44 %    LA volume 61.48 cm3    STJ 2.90 cm    LV mass 101.14 g    LA size 3.17 cm    RVDD 2.79 cm    RV S' 0.01 cm/s    Left Ventricle Relative Wall Thickness 0.43 cm    AV mean gradient 11 mmHg    AV valve area 1.36 cm2    AV Velocity Ratio 0.37     AV index (prosthetic) 0.41     MV mean gradient 2 mmHg    MV valve area by continuity eq 1.25 cm2    IVRT 125.59 msec    Pulm vein S/D ratio 1.06     LVOT diameter 2.06 cm    LVOT area 3.3 cm2    LVOT peak dean 0.79 m/s    LVOT peak VTI 14.20 cm    Ao peak dean 2.16 m/s    Ao VTI 34.7 cm    LVOT stroke volume 47.30 cm3    AV peak gradient 19 mmHg    MV peak gradient 6 mmHg    TR Max Dean 2.72 m/s    MV VTI 37.7 cm    PV Peak S Dean 0.33 m/s    PV Peak D Dean 0.31 m/s    LV Systolic Volume 28.17 mL    LV Systolic Volume Index 16.0 mL/m2    LV Diastolic Volume 81.04 mL    LV Diastolic Volume Index 46.05 mL/m2    LA Volume Index 34.9 mL/m2    LV Mass Index 57 g/m2    RA Major Axis 6.05 cm    Left Atrium Minor Axis 6.63 cm    Left Atrium Major Axis 6.07 cm    Triscuspid Valve Regurgitation Peak Gradient 30 mmHg    LA Volume Index (Mod) 36.7 mL/m2    LA volume (mod) 64.61 cm3    RA Width 3.70 cm    Right Atrial Pressure (from IVC) 15 mmHg    EF 55 %    TV rest pulmonary artery pressure 45 mmHg   Lithium level    Collection Time: 10/12/22 12:27 PM   Result Value Ref Range    Lithium Level 1.1 0.6 - 1.2 mmol/L   Ammonia    Collection Time: 10/12/22 12:27 PM   Result Value Ref Range    Ammonia 26 10 - 50 umol/L   CBC Auto Differential    Collection Time: 10/13/22  2:50 AM   Result Value Ref Range    WBC 15.29 (H) 3.90 - 12.70 K/uL    RBC 3.57 (L) 4.60 - 6.20 M/uL    Hemoglobin 11.5 (L) 14.0 - 18.0 g/dL    Hematocrit 37.6 (L) 40.0 - 54.0 %     (H) 82 - 98 fL    MCH 32.2 (H) 27.0 - 31.0 pg    MCHC 30.6 (L) 32.0 - 36.0 g/dL    RDW 15.5 (H) 11.5 - 14.5 %    Platelets 381 150 - 450 K/uL    MPV 10.1 9.2 - 12.9 fL    Immature Granulocytes 0.5  0.0 - 0.5 %    Gran # (ANC) 12.1 (H) 1.8 - 7.7 K/uL    Immature Grans (Abs) 0.07 (H) 0.00 - 0.04 K/uL    Lymph # 1.9 1.0 - 4.8 K/uL    Mono # 1.1 (H) 0.3 - 1.0 K/uL    Eos # 0.1 0.0 - 0.5 K/uL    Baso # 0.03 0.00 - 0.20 K/uL    nRBC 0 0 /100 WBC    Gran % 79.0 (H) 38.0 - 73.0 %    Lymph % 12.6 (L) 18.0 - 48.0 %    Mono % 7.3 4.0 - 15.0 %    Eosinophil % 0.4 0.0 - 8.0 %    Basophil % 0.2 0.0 - 1.9 %    Differential Method Automated    Basic Metabolic Panel    Collection Time: 10/13/22  2:50 AM   Result Value Ref Range    Sodium 148 (H) 136 - 145 mmol/L    Potassium 4.1 3.5 - 5.1 mmol/L    Chloride 122 (H) 95 - 110 mmol/L    CO2 16 (L) 23 - 29 mmol/L    Glucose 91 70 - 110 mg/dL    BUN 40 (H) 8 - 23 mg/dL    Creatinine 1.6 (H) 0.5 - 1.4 mg/dL    Calcium 10.0 8.7 - 10.5 mg/dL    Anion Gap 10 8 - 16 mmol/L    eGFR 44 (A) >60 mL/min/1.73 m^2   Magnesium    Collection Time: 10/13/22  2:50 AM   Result Value Ref Range    Magnesium 2.2 1.6 - 2.6 mg/dL   Comprehensive Metabolic Panel    Collection Time: 10/13/22  2:50 AM   Result Value Ref Range    Sodium 148 (H) 136 - 145 mmol/L    Potassium 4.1 3.5 - 5.1 mmol/L    Chloride 122 (H) 95 - 110 mmol/L    CO2 16 (L) 23 - 29 mmol/L    Glucose 91 70 - 110 mg/dL    BUN 40 (H) 8 - 23 mg/dL    Creatinine 1.6 (H) 0.5 - 1.4 mg/dL    Calcium 10.0 8.7 - 10.5 mg/dL    Total Protein 6.1 6.0 - 8.4 g/dL    Albumin 3.1 (L) 3.5 - 5.2 g/dL    Total Bilirubin 0.6 0.1 - 1.0 mg/dL    Alkaline Phosphatase 102 55 - 135 U/L    AST 25 10 - 40 U/L    ALT 22 10 - 44 U/L    Anion Gap 10 8 - 16 mmol/L    eGFR 44 (A) >60 mL/min/1.73 m^2      No results found for: PHENYTOIN, PHENOBARB, VALPROATE, CBMZ      EXAMINATION    VITALS   Vitals:    10/13/22 0356 10/13/22 0430 10/13/22 0742 10/13/22 0838   BP:  (!) 149/75 (!) 154/70    BP Location:       Patient Position:  Lying     Pulse: 78 70 73    Resp:  20 18    Temp:  97.5 °F (36.4 °C) 97.7 °F (36.5 °C)    TempSrc:  Oral Axillary    SpO2:  100%  98%   Weight:   62.9 kg (138 lb 10.7 oz)     Height:          CONSTITUTIONAL  General Appearance: NAD, unremarkable, age appropriate, disheveled, lying in bed, calm / somnolent, confused, normal weight      MUSCULOSKELETAL  Muscle Strength and Tone: attempted but unable to assess due to patient's AMS / Delirium / Dementia  Abnormal Involuntary Movements: minimal tremors in BUEs (improving)  Gait and Station: attempted but unable to assess due to patient's AMS / Delirium / Dementia     PSYCHIATRIC   Behavior/Cooperation:  reluctant to participate, uncooperative, psychomotor retardation, eye contact minimal  Speech:  slowed, dysarthia, increased latency of response, soft  Language: grossly intact with spontaneous speech  Mood: attempted but unable to assess due to patient's AMS / Delirium / Dementia  Affect:  Blunted  Associations: +ASHWIN  Thought Process: Confused / Disorganized / ASHWIN / Perseverative   Thought Content: attempted but unable to assess due to patient's AMS / Delirium / Dementia  Sensorium:  Awake/Delirium  Alert and Oriented: to self only.  He was disoriented to name of place, type of place, city, state, month, year, and situation.  Memory: attempted but unable to assess due to patient's AMS / Delirium / Dementia  Attention/concentration: Impaired / Limited   Similarities: Impaired / Limited   Abstract reasoning: Impaired / Limited  Fund of Knowledge: attempted but unable to assess due to patient's AMS / Delirium / Dementia  Insight: Impaired / Limited  Judgment: Impaired / Limited      CAM ICU Delirium Assessment - POSITIVE      Is the patient aware of the biomedical complications associated with substance abuse and mental illness?   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     - Continue to HOLD prior Lithium and Haldol at this time (patient with ALICIA on admission ; monitoring for improvement) (attempted to discuss risks/benefits/alt vs no treatment with patient).     - Would have liked to begin Depacon 250 mg IV TID for mood / behavioral disturbances in delirium / dementia & Bipolar dx, but pharmacy is out of this medication at this time (nationwide supply chain issue) (no reasonable alternative at this time while patient can't take medications orally) (attempted to discuss risks/benefits/alt vs no treatment with patient).     Implement / Continue the below DELIRIUM BEHAVIOR MANAGEMENT:  - Minimize use of restraints; if physical restraints necessary, please utilize medical/chemical prns for agitation (can use Zyprexa 2.5 mg to 5 mg PO/IM q8 hours PRN).  - Keep shades open and room lit during day and room dim at night in order to promote healthy circadian rhythms.  - Encourage family at bedside.  - Keep whiteboard in patient's room current with the date and name of the members of patient's team for easy patient self re-orientation.  - Avoid benzodiazepines, antihistamines, anticholinergics, hypnotics, and minimize opiates while controlling for pain as these medications may exacerbate delirium.      - Psychotherapy was again performed with patient as noted above with a focus on improving orientation, confusion, mood / irritability, and behavior.     - Patient's most recent resulted labs, imaging, and EKG were reviewed again today ; CT Head from 10/11/2022 with "global prominence of the ventricles, cisterns and sulci as seen with " "senescent atrophic changes similar to prior exam.  Moderate confluent low density in the periventricular white matter is again noted as commonly seen with chronic microvascular ischemic changes.  Remote lacunar infarcts are again noted bilaterally in the basal ganglia.  No appreciable mass or mass effect is seen.  Remote left inferior cerebellar infarct again noted." Ammonia level was wnl at 26 from 10/12/22 and Lithium level was 1.1 from 10/12/22.       - Continue to monitor EKG to ensure that QTc remains below 500, especially if patient is requiring any PRN neuroleptic medications.      - Provide Folate / Thiamine / Multi-Vitamin supplementation.    - Consider Neurology Team involvement for further assessment of encephalopathy (such as need for LP, EEG, etc).     - Thank you for this consult ; will continue to follow patient         Total time spent with patient and/or managing/coordinating patient's care today (excluding the time spent on psychotherapy): 42 minutes   Time spent on psychotherapy today (as noted above): 17 minutes   Total time for encounter today including psychotherapy: 59 minutes      More than 50% of the time was spent counseling/coordinating care.     Consulting clinician was informed of the encounter and consult note.      STAFF:  Frederick Ramos MD  [facility]  10/13/2022   "

## 2022-10-14 PROBLEM — E55.9 VITAMIN D DEFICIENCY: Status: ACTIVE | Noted: 2022-10-14

## 2022-10-14 LAB
25(OH)D3+25(OH)D2 SERPL-MCNC: 14 NG/ML (ref 30–96)
ALBUMIN SERPL BCP-MCNC: 2.6 G/DL (ref 3.5–5.2)
ALBUMIN SERPL BCP-MCNC: 2.8 G/DL (ref 3.5–5.2)
ALP SERPL-CCNC: 94 U/L (ref 55–135)
ALT SERPL W/O P-5'-P-CCNC: 21 U/L (ref 10–44)
ANION GAP SERPL CALC-SCNC: 6 MMOL/L (ref 8–16)
ANION GAP SERPL CALC-SCNC: 8 MMOL/L (ref 8–16)
APTT BLDCRRT: 37.7 SEC (ref 21–32)
APTT BLDCRRT: 41.3 SEC (ref 21–32)
APTT BLDCRRT: 56.6 SEC (ref 21–32)
APTT BLDCRRT: 85.2 SEC (ref 21–32)
AST SERPL-CCNC: 17 U/L (ref 10–40)
BASOPHILS # BLD AUTO: 0.03 K/UL (ref 0–0.2)
BASOPHILS NFR BLD: 0.2 % (ref 0–1.9)
BILIRUB SERPL-MCNC: 0.5 MG/DL (ref 0.1–1)
BUN SERPL-MCNC: 22 MG/DL (ref 8–23)
BUN SERPL-MCNC: 28 MG/DL (ref 8–23)
CALCIUM SERPL-MCNC: 10.1 MG/DL (ref 8.7–10.5)
CALCIUM SERPL-MCNC: 9.8 MG/DL (ref 8.7–10.5)
CHLORIDE SERPL-SCNC: 115 MMOL/L (ref 95–110)
CHLORIDE SERPL-SCNC: 120 MMOL/L (ref 95–110)
CO2 SERPL-SCNC: 19 MMOL/L (ref 23–29)
CO2 SERPL-SCNC: 19 MMOL/L (ref 23–29)
CREAT SERPL-MCNC: 1.2 MG/DL (ref 0.5–1.4)
CREAT SERPL-MCNC: 1.4 MG/DL (ref 0.5–1.4)
DIFFERENTIAL METHOD: ABNORMAL
EOSINOPHIL # BLD AUTO: 0.1 K/UL (ref 0–0.5)
EOSINOPHIL NFR BLD: 0.7 % (ref 0–8)
ERYTHROCYTE [DISTWIDTH] IN BLOOD BY AUTOMATED COUNT: 15.5 % (ref 11.5–14.5)
EST. GFR  (NO RACE VARIABLE): 51 ML/MIN/1.73 M^2
EST. GFR  (NO RACE VARIABLE): >60 ML/MIN/1.73 M^2
FOLATE SERPL-MCNC: 13.2 NG/ML (ref 4–24)
GLUCOSE SERPL-MCNC: 134 MG/DL (ref 70–110)
GLUCOSE SERPL-MCNC: 173 MG/DL (ref 70–110)
HCT VFR BLD AUTO: 34.6 % (ref 40–54)
HGB BLD-MCNC: 10.5 G/DL (ref 14–18)
IMM GRANULOCYTES # BLD AUTO: 0.07 K/UL (ref 0–0.04)
IMM GRANULOCYTES NFR BLD AUTO: 0.5 % (ref 0–0.5)
LYMPHOCYTES # BLD AUTO: 2.2 K/UL (ref 1–4.8)
LYMPHOCYTES NFR BLD: 16.4 % (ref 18–48)
MAGNESIUM SERPL-MCNC: 1.9 MG/DL (ref 1.6–2.6)
MCH RBC QN AUTO: 32.1 PG (ref 27–31)
MCHC RBC AUTO-ENTMCNC: 30.3 G/DL (ref 32–36)
MCV RBC AUTO: 106 FL (ref 82–98)
MONOCYTES # BLD AUTO: 1 K/UL (ref 0.3–1)
MONOCYTES NFR BLD: 7.2 % (ref 4–15)
NEUTROPHILS # BLD AUTO: 10.1 K/UL (ref 1.8–7.7)
NEUTROPHILS NFR BLD: 75 % (ref 38–73)
NRBC BLD-RTO: 0 /100 WBC
PHOSPHATE SERPL-MCNC: 1.6 MG/DL (ref 2.7–4.5)
PLATELET # BLD AUTO: 356 K/UL (ref 150–450)
PMV BLD AUTO: 9.6 FL (ref 9.2–12.9)
POTASSIUM SERPL-SCNC: 3.2 MMOL/L (ref 3.5–5.1)
POTASSIUM SERPL-SCNC: 4.5 MMOL/L (ref 3.5–5.1)
PROT SERPL-MCNC: 5.3 G/DL (ref 6–8.4)
RBC # BLD AUTO: 3.27 M/UL (ref 4.6–6.2)
SODIUM SERPL-SCNC: 140 MMOL/L (ref 136–145)
SODIUM SERPL-SCNC: 140 MMOL/L (ref 136–145)
SODIUM SERPL-SCNC: 147 MMOL/L (ref 136–145)
VIT B12 SERPL-MCNC: 722 PG/ML (ref 210–950)
WBC # BLD AUTO: 13.53 K/UL (ref 3.9–12.7)

## 2022-10-14 PROCEDURE — 99900035 HC TECH TIME PER 15 MIN (STAT)

## 2022-10-14 PROCEDURE — 25000003 PHARM REV CODE 250: Performed by: NURSE PRACTITIONER

## 2022-10-14 PROCEDURE — 99233 SBSQ HOSP IP/OBS HIGH 50: CPT | Mod: ,,, | Performed by: PSYCHIATRY & NEUROLOGY

## 2022-10-14 PROCEDURE — 85025 COMPLETE CBC W/AUTO DIFF WBC: CPT | Performed by: PHYSICIAN ASSISTANT

## 2022-10-14 PROCEDURE — 85730 THROMBOPLASTIN TIME PARTIAL: CPT | Mod: 91 | Performed by: HOSPITALIST

## 2022-10-14 PROCEDURE — 93005 ELECTROCARDIOGRAM TRACING: CPT

## 2022-10-14 PROCEDURE — 87205 SMEAR GRAM STAIN: CPT | Performed by: INTERNAL MEDICINE

## 2022-10-14 PROCEDURE — 82746 ASSAY OF FOLIC ACID SERUM: CPT | Performed by: PHYSICIAN ASSISTANT

## 2022-10-14 PROCEDURE — 99233 PR SUBSEQUENT HOSPITAL CARE,LEVL III: ICD-10-PCS | Mod: ,,, | Performed by: PSYCHIATRY & NEUROLOGY

## 2022-10-14 PROCEDURE — 90833 PR PSYCHOTHERAPY W/PATIENT W/E&M, 30 MIN (ADD ON): ICD-10-PCS | Mod: ,,, | Performed by: PSYCHIATRY & NEUROLOGY

## 2022-10-14 PROCEDURE — 80053 COMPREHEN METABOLIC PANEL: CPT | Performed by: PHYSICIAN ASSISTANT

## 2022-10-14 PROCEDURE — 25000003 PHARM REV CODE 250: Performed by: PHYSICIAN ASSISTANT

## 2022-10-14 PROCEDURE — 93010 EKG 12-LEAD: ICD-10-PCS | Mod: ,,, | Performed by: INTERNAL MEDICINE

## 2022-10-14 PROCEDURE — 63600175 PHARM REV CODE 636 W HCPCS: Performed by: NURSE PRACTITIONER

## 2022-10-14 PROCEDURE — 83935 ASSAY OF URINE OSMOLALITY: CPT | Performed by: STUDENT IN AN ORGANIZED HEALTH CARE EDUCATION/TRAINING PROGRAM

## 2022-10-14 PROCEDURE — 80069 RENAL FUNCTION PANEL: CPT | Performed by: STUDENT IN AN ORGANIZED HEALTH CARE EDUCATION/TRAINING PROGRAM

## 2022-10-14 PROCEDURE — 82607 VITAMIN B-12: CPT | Performed by: PHYSICIAN ASSISTANT

## 2022-10-14 PROCEDURE — 90833 PSYTX W PT W E/M 30 MIN: CPT | Mod: ,,, | Performed by: PSYCHIATRY & NEUROLOGY

## 2022-10-14 PROCEDURE — 99223 PR INITIAL HOSPITAL CARE,LEVL III: ICD-10-PCS | Mod: ,,, | Performed by: PSYCHIATRY & NEUROLOGY

## 2022-10-14 PROCEDURE — 99900026 HC AIRWAY MAINTENANCE (STAT)

## 2022-10-14 PROCEDURE — 36415 COLL VENOUS BLD VENIPUNCTURE: CPT | Performed by: HOSPITALIST

## 2022-10-14 PROCEDURE — 99223 1ST HOSP IP/OBS HIGH 75: CPT | Mod: ,,, | Performed by: PSYCHIATRY & NEUROLOGY

## 2022-10-14 PROCEDURE — 25000242 PHARM REV CODE 250 ALT 637 W/ HCPCS: Performed by: NURSE PRACTITIONER

## 2022-10-14 PROCEDURE — 36415 COLL VENOUS BLD VENIPUNCTURE: CPT | Performed by: PHYSICIAN ASSISTANT

## 2022-10-14 PROCEDURE — 94761 N-INVAS EAR/PLS OXIMETRY MLT: CPT

## 2022-10-14 PROCEDURE — 11000001 HC ACUTE MED/SURG PRIVATE ROOM

## 2022-10-14 PROCEDURE — 93010 ELECTROCARDIOGRAM REPORT: CPT | Mod: ,,, | Performed by: INTERNAL MEDICINE

## 2022-10-14 PROCEDURE — 36415 COLL VENOUS BLD VENIPUNCTURE: CPT | Performed by: STUDENT IN AN ORGANIZED HEALTH CARE EDUCATION/TRAINING PROGRAM

## 2022-10-14 PROCEDURE — 87070 CULTURE OTHR SPECIMN AEROBIC: CPT | Performed by: INTERNAL MEDICINE

## 2022-10-14 PROCEDURE — 83735 ASSAY OF MAGNESIUM: CPT | Performed by: PHYSICIAN ASSISTANT

## 2022-10-14 PROCEDURE — 94640 AIRWAY INHALATION TREATMENT: CPT

## 2022-10-14 PROCEDURE — 84591 ASSAY OF NOS VITAMIN: CPT | Performed by: PHYSICIAN ASSISTANT

## 2022-10-14 PROCEDURE — 84295 ASSAY OF SERUM SODIUM: CPT | Performed by: PHYSICIAN ASSISTANT

## 2022-10-14 PROCEDURE — 63600175 PHARM REV CODE 636 W HCPCS: Performed by: PHYSICIAN ASSISTANT

## 2022-10-14 PROCEDURE — S0166 INJ OLANZAPINE 2.5MG: HCPCS | Performed by: NURSE PRACTITIONER

## 2022-10-14 RX ORDER — SODIUM CHLORIDE FOR INHALATION 3 %
4 VIAL, NEBULIZER (ML) INHALATION
Status: DISCONTINUED | OUTPATIENT
Start: 2022-10-14 | End: 2022-10-20 | Stop reason: HOSPADM

## 2022-10-14 RX ADMIN — AZITHROMYCIN MONOHYDRATE 500 MG: 500 INJECTION, POWDER, LYOPHILIZED, FOR SOLUTION INTRAVENOUS at 01:10

## 2022-10-14 RX ADMIN — DEXTROSE: 5 SOLUTION INTRAVENOUS at 05:10

## 2022-10-14 RX ADMIN — CEFTRIAXONE 1 G: 1 INJECTION, SOLUTION INTRAVENOUS at 10:10

## 2022-10-14 RX ADMIN — CEFTRIAXONE 1 G: 1 INJECTION, SOLUTION INTRAVENOUS at 12:10

## 2022-10-14 RX ADMIN — IPRATROPIUM BROMIDE AND ALBUTEROL SULFATE 3 ML: .5; 3 SOLUTION RESPIRATORY (INHALATION) at 09:10

## 2022-10-14 RX ADMIN — DEXTROSE: 5 SOLUTION INTRAVENOUS at 11:10

## 2022-10-14 RX ADMIN — OLANZAPINE 2.5 MG: 10 INJECTION, POWDER, LYOPHILIZED, FOR SOLUTION INTRAMUSCULAR at 10:10

## 2022-10-14 NOTE — ASSESSMENT & PLAN NOTE
Facility medlist: atorvastatin 80 mg qhs, finasteride 5 mg qhs, lisinopril 40 mg daily, melatonin 15 mg qhs, metoprolol ER 25 mg daily, MVI daily, spironolactone 25 mg qhs, thiamine 100 mg daily, duloxetine 40 mg daily, eliquis 5 mg bid daily, trazodone 25 mg daily, as needed mag hydroxide, as need maalox, as need tylenol 325 mg q 4 prn, as needed nitro  -failed swallow study, oral meds on hold  -heparin drip initiated

## 2022-10-14 NOTE — ASSESSMENT & PLAN NOTE
HCAP (healthcare-associated pneumonia)  UTI (urinary tract infection)  Leukocytosis    Continuous Cardiac monitoring  Trend troponin, trended down  Oxygen prn  duonebs prn  IVFs   Pt was started on macrobid outpatient on 10/11.  Continue IV ceftriaxone and azithro  -UC multiple organisms in predominance, called micro lab and spoke with Yo Huff, will look at plate again.  Update 10/14 gram negative rods and proteus, continue ceftriaxone and azithro.  -US retroperitoneal: Probable blood products in the urinary bladder.  A mass is not entirely excluded, follow-up is recommended. No hydronephrosis.   BC NGTD  Psychiatry following and recommended Neurology consult, will place.  neurochecks   -failed swallow study, nutrition consult for possible TF

## 2022-10-14 NOTE — ASSESSMENT & PLAN NOTE
Bipolar disorder    [facility] due to medical issues  Stable  PEC in place  Consult Psychiatry for continued management   Psych recommended d/c PEC

## 2022-10-14 NOTE — ASSESSMENT & PLAN NOTE
Heart rate controlled  Continuous cardiac monitoring  [id number]  Pt was on eliquis 5 mg bid, failed swallow study.  Start low intensity heparin drip.    Results for orders placed during the hospital encounter of 10/11/22    Echo    Interpretation Summary  · The left ventricle is normal in size with concentric remodeling and normal systolic function.  · The estimated ejection fraction is 55%.  · A diastolic pattern consistent with atrial fibrillation observed.  · Mild-to-moderate mitral regurgitation.  · There is mild aortic valve stenosis.  · Aortic valve area is 1.36 cm2; peak velocity is 2.16 m/s; mean gradient is 11 mmHg.  · Elevated central venous pressure (15 mmHg).  · The estimated PA systolic pressure is 45 mmHg.  · Mild tricuspid regurgitation.  · Normal right ventricular size with normal right ventricular systolic function.  · There is pulmonary hypertension.

## 2022-10-14 NOTE — PLAN OF CARE
10/14/22 1454   Post-Acute Status   Post-Acute Authorization Placement   Post-Acute Placement Status Pending medical clearance/testing  (Awaiting review of referral. 1452 pm - Intake gone for the day. Discussed with David downs and reports pt was to DC to [facility] and had already worked info with admissions. Updates requested to DESTINY and MD signature required.)

## 2022-10-14 NOTE — SUBJECTIVE & OBJECTIVE
Interval History:  no acute events overnight, mumbling his name.    Review of Systems   Unable to perform ROS: Mental status change   Objective:     Vital Signs (Most Recent):  Temp: 98.2 °F (36.8 °C) (10/14/22 1123)  Pulse: 92 (10/14/22 1212)  Resp: 18 (10/14/22 1123)  BP: (!) 142/68 (10/14/22 1123)  SpO2: 97 % (10/14/22 1123)   Vital Signs (24h Range):  Temp:  [96.7 °F (35.9 °C)-98.4 °F (36.9 °C)] 98.2 °F (36.8 °C)  Pulse:  [69-92] 92  Resp:  [18] 18  SpO2:  [95 %-100 %] 97 %  BP: (129-156)/(68-85) 142/68     Weight: 62.7 kg (138 lb 3.7 oz)  Body mass index is 21.65 kg/m².    Intake/Output Summary (Last 24 hours) at 10/14/2022 1454  Last data filed at 10/14/2022 1320  Gross per 24 hour   Intake 2337.43 ml   Output 1750 ml   Net 587.43 ml      Physical Exam  HENT:      Head: Normocephalic and atraumatic.   Cardiovascular:      Rate and Rhythm: Normal rate.      Pulses: Normal pulses.   Pulmonary:      Effort: No respiratory distress.      Breath sounds: No wheezing.   Abdominal:      General: Bowel sounds are normal.      Palpations: Abdomen is soft.   Musculoskeletal:         General: No deformity.      Cervical back: Neck supple.   Skin:     General: Skin is warm and dry.   Neurological:      Mental Status: He is alert. He is disoriented.      GCS: GCS eye subscore is 4. GCS verbal subscore is 3. GCS motor subscore is 5.       Significant Labs: All pertinent labs within the past 24 hours have been reviewed.    Significant Imaging: I have reviewed all pertinent imaging results/findings within the past 24 hours.

## 2022-10-14 NOTE — CONSULTS
"  [facility] - Telemetry  Adult Nutrition  Consult Note    SUMMARY     Recommendations    Recommendation:  1. Await further SLP recs.   2. If TF desired, initiate TF of Isosource 1.5 at 10ml/hr and advance as tolerated to goal rate of 50ml/hr which would provide 1800 kcal, 81g protein, & 916ml free water.   3. Monitor weight/labs.   4. RD to follow to monitor nutrition status    Goals:   TF or diet will br started by RD follow up  Nutrition Goal Status: new  Communication of RD Recs: reviewed with RN    Assessment and Plan  * Encephalopathy due to infection  Contributing Nutrition Diagnosis  Inadequate energy intake    Related to (etiology):   AMS    Signs and Symptoms (as evidenced by):   NPO per SLP recs    Interventions:  Collaboration with other providers    Nutrition Diagnosis Status:   New      Malnutrition Assessment  Unable to assess NFPE 2/2 pt confused at visit      Reason for Assessment  Reason For Assessment: consult (tube feed recs, failed swallow study)  Diagnosis:  (encephalopathy)  Relevant Medical History: CVA, PE, HTN, bipolar d/o  General Information Comments: Pt NPO at this time per SLP recs. Pt confused at visit-unable to assess NFPE or obtain any hx. Geronimo 14-skin intact. Pt admitted from [facility] with AMS. Geronimo 14-skin intact. No weight hx per chart  Nutrition Discharge Planning: d/c needs to be determined    Nutrition Risk Screen  Nutrition Risk Screen: dysphagia or difficulty swallowing    Nutrition/Diet History  Food Preferences: no Nondenominational or cultural food prefs identified  Spiritual, Cultural Beliefs, Protestant Practices, Values that Affect Care: no  Factors Affecting Nutritional Intake: difficulty/impaired swallowing, NPO, impaired cognitive status/motor control    Anthropometrics  Temp: 98.2 °F (36.8 °C)  Height: 5' 7" (170.2 cm)  Height (inches): 67 in  Weight Method: Bed Scale  Weight: 62.7 kg (138 lb 3.7 oz)  Weight (lb): 138.23 lb  Ideal Body Weight (IBW), Male: 148 lb  % " Ideal Body Weight, Male (lb): 93.4 %  BMI (Calculated): 21.6  BMI Grade: 18.5-24.9 - normal     Lab/Procedures/Meds  Pertinent Labs Reviewed: reviewed  Pertinent Labs Comments: Na 147H, BUN 28H, Glu 173H, Alb 2.8L  Pertinent Medications Reviewed: reviewed  Pertinent Medications Comments: azithromycin, rocephin, 5% dex at 125, heparin    Estimated/Assessed Needs  Weight Used For Calorie Calculations: 62.7 kg (138 lb 3.7 oz)  Energy Calorie Requirements (kcal): 1881 (30 kcal/kg)  Energy Need Method: Kcal/kg  Protein Requirements: 75g (1.2g/kg)  Weight Used For Protein Calculations: 62.7 kg (138 lb 3.7 oz)  Estimated Fluid Requirement Method: RDA Method  RDA Method (mL): 1881     Nutrition Prescription Ordered  Current Diet Order: NPO    Evaluation of Received Nutrient/Fluid Intake  I/O: 2337/1350  Energy Calories Required: not meeting needs  Protein Required: not meeting needs  Fluid Required: not meeting needs  Comments: LBM 10/10  % Intake of Estimated Energy Needs: Other: NPO  % Meal Intake: NPO    Nutrition Risk  Level of Risk/Frequency of Follow-up:  (2xweekly)     Monitor and Evaluation  Food and Nutrient Intake: energy intake  Food and Nutrient Adminstration: diet order, enteral and parenteral nutrition administration  Physical Activity and Function: nutrition-related ADLs and IADLs  Anthropometric Measurements: weight  Biochemical Data, Medical Tests and Procedures: electrolyte and renal panel  Nutrition-Focused Physical Findings: overall appearance     Nutrition Follow-Up  RD Follow-up?: Yes

## 2022-10-14 NOTE — CONSULTS
Nephrology Consult  H&P      Consult Requested By: Matthew Amaro, *  Reason for Consult: ALICIA Hypernatremia    SUBJECTIVE:     History of Present Illness:  Cheikh Yanez is a 81 y.o.   male who  has  past medical history HTN, PE,HF, Afib, Dementia, Stroke, Bipolar. The patient presented to the [facility] on 10/11/20[facility] from [facility] with a primary complaint of AMS, nephrology consulted for ALICIA Hypernatremia.   ?    Review of Systems   Unable to perform ROS: Acuity of condition     No past medical history on file.  No past surgical history on file.  No family history on file.       Review of patient's allergies indicates:  No Known Allergies         OBJECTIVE:     Vital Signs (Most Recent)  Vitals:    10/14/22 0400 10/14/22 0500 10/14/22 0708 10/14/22 0906   BP:    (!) 151/85   BP Location:    Right arm   Patient Position:    Lying   Pulse: 84   69   Resp:    18   Temp:    98.4 °F (36.9 °C)   TempSrc:    Oral   SpO2:   96% 95%   Weight:  62.7 kg (138 lb 3.7 oz)     Height:                     Medications:   azithromycin  500 mg Intravenous Q24H    cefTRIAXone (ROCEPHIN) IVPB  1 g Intravenous Q24H           Physical Exam  Vitals and nursing note reviewed.   Constitutional:       General: He is not in acute distress.     Appearance: He is ill-appearing. He is not diaphoretic.   HENT:      Head: Normocephalic and atraumatic.      Mouth/Throat:      Pharynx: No oropharyngeal exudate.   Eyes:      General: No scleral icterus.     Conjunctiva/sclera: Conjunctivae normal.      Pupils: Pupils are equal, round, and reactive to light.   Cardiovascular:      Rate and Rhythm: Normal rate and regular rhythm.      Heart sounds: Normal heart sounds. No murmur heard.  Pulmonary:      Effort: No respiratory distress.      Breath sounds: Rhonchi present.   Abdominal:      General: Bowel sounds are normal. There is no distension.      Palpations: Abdomen is soft.      Tenderness: There is no abdominal tenderness.    Musculoskeletal:         General: Normal range of motion.      Cervical back: Normal range of motion and neck supple.   Skin:     General: Skin is warm and dry.      Findings: No erythema.   Neurological:      Mental Status: He is alert. He is disoriented and confused.      Cranial Nerves: No cranial nerve deficit.       Laboratory:  Recent Labs   Lab 10/11/22  2123 10/13/22  0250 10/14/22  0320   WBC 16.91* 15.29* 13.53*   HGB 12.1* 11.5* 10.5*   HCT 39.6* 37.6* 34.6*    381 356   MONO 7.7  1.3* 7.3  1.1* 7.2  1.0     Recent Labs   Lab 10/11/22  2123 10/12/22  0237 10/13/22  0250 10/13/22  1251 10/13/22  1724 10/14/22  0321     --  148*  148* 148* 145 147*   K 5.2*  --  4.1  4.1  --   --  4.5   *  --  122*  122*  --   --  120*   CO2 17*  --  16*  16*  --   --  19*   BUN 53*  --  40*  40*  --   --  28*   CREATININE 2.2*  --  1.6*  1.6*  --   --  1.4   CALCIUM 10.9*  --  10.0  10.0  --   --  10.1   PHOS  --  3.5  --   --   --   --        Diagnostic Results:  X-Ray: Reviewed  US: Reviewed  Echo: Reviewed  ASSESSMENT/PLAN:     1. ALICIA/Hypernatremia/Hypercalcemia   -  Unknown baseline Cr 2.2 now improving  1.4 UA + for UTIOn Abx, also per chart seems volume depleted.received 1L NS   -- Treat as UTI ? AMS vs Pscych exacerbated by UTI   -- Cr improving. Need FW but fail swallowing study   Consider his Dementia Psych Hx placing OG/NG/PEG is not reasonable   Continue IV D5% Na+ improving Avoid NS, Vit D   -- Recommend Palliative consult   -- Labs in PM to adjust d5% rate and check UA osm     -- Please avoid nephrotoxins, including NSAIDs, aminoglycosides, IV contrast (unless absolutely necessary), gadolinium, fleets and other phosphorous-based laxatives. Caution with antibiotics.    -- Daily Renal Function Panel  -- Avoid Hypotension.  -- Renally dose all meds  2. HTN (I10) -  at range monitor for now   3. 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     Recent Labs   Lab 10/12/22  0237 10/13/22  0250 10/14/22  0321   MG 2.3 2.2 1.9       Lab Results   Component Value Date    CALCIUM 10.1 10/14/2022    PHOS 3.5 10/12/2022     Lab Results   Component Value Date    MKCZMDHR42IA 14 (L) 10/13/2022   -- Hold Vit D for now     Lab Results   Component Value Date    CO2 19 (L) 10/14/2022   Hyperchloremia - need free water - for now do D5%     5. Nutrition/Hypoalbuminemia (E88.09) -    Recent Labs   Lab 10/13/22  0250 10/13/22  1336 10/14/22  0321   LABPROT  --  12.3  --    ALBUMIN 3.1*  --  2.8*   Fail swallowing study Consider his Dementia Psych and his comorbidities  - placing OG/NG/PEG is not reasonable not safe    Continue IV D5%  for now       Thank you for the consult, will follow  With any question please call [phone]  Marietta Bautista MD    [facility]  BAUTISTA Mcneal MD, FACP,   MKya Harley MD,   MD BLAYNE Adams MD E. V. Harmon, NP    [address]  [place], [address] [phone]

## 2022-10-14 NOTE — PLAN OF CARE
POC, labs results, MAR and orders reviewed.     Problem: Adult Inpatient Plan of Care  Goal: Plan of Care Review  Outcome: Ongoing, Progressing  Goal: Absence of Hospital-Acquired Illness or Injury  Outcome: Ongoing, Progressing     Problem: Fall Injury Risk  Goal: Absence of Fall and Fall-Related Injury  Outcome: Ongoing, Progressing     Problem: Skin Injury Risk Increased  Goal: Skin Health and Integrity  Outcome: Ongoing, Progressing     Problem: UTI (Urinary Tract Infection)  Goal: Improved Infection Symptoms  Outcome: Ongoing, Progressing

## 2022-10-14 NOTE — NURSING
Aptt resulted at 107.6 and a recollect to verify results per nurse was drawn and sent to lab . Awaiting results.

## 2022-10-14 NOTE — ASSESSMENT & PLAN NOTE
Suspect hypovolemic hypernatremia however pt does have ALICIA and history of lithium use.  Will consult nephrology, possible diabetes insipidus  Lithium level 1.5 on 10/6/22, elevated  -Nephrology recs appreciated, agree with D5.  No NS.

## 2022-10-14 NOTE — PLAN OF CARE
10/14/22 0937   Discharge Reassessment   Assessment Type Discharge Planning Reassessment   Did the patient's condition or plan change since previous assessment? Yes   Discharge Plan discussed with: POA   Communicated JAYSON with patient/caregiver Yes   Discharge Plan A Psychiatric hospital   Discharge Plan B New Nursing Home placement - intermediate care facility   DME Needed Upon Discharge  other (see comments)   Discharge Barriers Identified Mental illness   Why the patient remains in the hospital Requires continued medical care   Post-Acute Status   Post-Acute Authorization Placement   Post-Acute Placement Status Referrals Sent  (CM initiated referrals sent. Choice to be obtained from referred facilites with POA/Caregiver.)   Hospital Resources/Appts/Education Provided Appointments scheduled and added to AVS     Discussed with Jc SANDOVAL. Pending medical clearance. Currently NPO by ST villalba. Referrals sent if post acute placement needed.

## 2022-10-14 NOTE — PLAN OF CARE
Recommendation:  1. Await further SLP recs.   2. If TF desired, initiate TF of Isosource 1.5 at 10ml/hr and advance as tolerated to goal rate of 50ml/hr which would provide 1800 kcal, 81g protein, & 916ml free water.   3. Monitor weight/labs.   4. RD to follow to monitor nutrition status    Goals:   TF or diet will br started by RD follow up  Nutrition Goal Status: new  Communication of RD Recs: reviewed with RN

## 2022-10-14 NOTE — NURSING
Plan of care reviewed with patient. Voiced understanding.  Afib on monitor. No acute distress noted at this time. Side rails x3, bed low, call bell within reach. Maintain bed alarm for patient safety. Patient will be monitored overnight.

## 2022-10-14 NOTE — MEDICAL/APP STUDENT
PSYCHIATRY INPATIENT PROGRESS NOTE  SUBSEQUENT HOSPITAL VISIT      10/14/2022 8:40 AM   Cheikh Yanez   1940   [id number]           DATE OF ADMISSION: 10/11/2022  8:28 PM    SITE: [facility]    CURRENT LEGAL STATUS: Patient does not currently meet PEC/CEC criteria due to not currently being an imminent threat to self/others and not being gravely disabled 2/2 mental illness at this time.       HISTORY    Per Initial History from Primary Team:   Chief Complaint:               Patient presents with    Altered Mental Status       Pt sent from [facility] for eval of AMS. Per Chani at [facility], pt sent for AMS, states that pt has been somnolent on today and had blood work that showed an ALICIA. Chani states that the doctor also noted abnormal lung sounds. Per Chani, pt baseline mentation is oriented to self and sometimes situation only. Pt currently a PEC for SI.     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 Patient unable to provide any history. Per RN, patient normally oriented to self and now is somnolent.  ED findings:  WBCs 16.91, potassium 5.2, creatinine 2.2, troponin 0.120, EKG without ischemic changes, u/a concerning for infectious process,  chest x-ray showed a right basilar airspace opacity, covid pending, blood cultures pending.  Patient admitted to hospital medicine observation unit for further medical management.   Interval History from Primary Team on 10/12/2022:  Patient seen this am. No resp distress noted. Sitter at the bedside. Home meds reconciled. Will hold all home meds 2/2 mental status change. Appreciate Psych's recs. Will follow.   Head CT reviewed.         Chief Complaint / Reason for Original Psychiatry Consult: AMS / PEC/CEC from [facility]        Subjective / Interval Psychiatric History Today (10/13/2022)  (with Psychiatric ROS below):  Cheikh Yanez is a 81 y.o. 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  Psychiatry was originally consulted as noted above.  The patient was seen and examined again today.  The chart was reviewed again today.  On examination today, the patient was somnolent and remains only oriented to self.  He was again disoriented to name of place, type of place, city, state, month, year, and situation.  He was again CAM-ICU positive for delirium. Patient was unable to open his eyes despite repeated attempts and was unable to answer any questions verbally. Due to patient's AMS / Delirium / Dementia, he was unable to endorse or deny and medical/physical complaints.   NAD was observed during the examination.  Despite multiple attempts, the comprehensive psychiatric assessment was again notably limited due to the patient's AMS / Delirium / Dementia.  Psychotherapy was again implemented with a focus on improving orientation, confusion, mood / irritability, and behavior.  See detailed psych ROS below. See collateral info below.  See A/P below.       Collateral:   I spoke to the patient's main caregiver (at [phone]).  He states that they live in a double shotgun-style home in the [place], [place].  He states that the patient has a psychiatric hx of Bipolar I Disorder and Dementia.  He states that even at baseline, the patient is often only oriented to person and situation.  He endorses that the patient has had a gradual decline in neurocognition over the past year.  He states that the patient has historically taken Lithium 300 mg PO BID for Bipolar Disorder.  He is unaware of the name of the patient's psychiatrist.  He has been struggling with  "delirium on top of underlying dementia since a hospital admission at [facility] in August 2022.    Per CM on 10/12/22:  Pt lying in bed with sitter at bedside, disoriented, confused, mumbling unintelligible words, unable to participate in assessment.  Contacted LORI Jc Melgar [phone](lives in [place], [place]).  Mr. Melgar shares POA with  Benito Jaquez [phone]  who lives 1 1/2 block from pt.  States pt lives with David Lopez [phone] who has been his tenant and caretaker for past 6 months.  POA states in the past 2 yrs, friends have noticed deterioration in health.  Smokes 2pk cigarettes per day and $300 monthly marijuana habit.  Pt has a hx of Bipolar disorder that usually take lithium for.  David oversees medication administration daily, pt receives individually wrapped from mail order pharmacy.  Mr Alvarez states the over the past few months, pt "lost the ability to walk, run and lives in an almost vegetative state."  He reports pt has fallen a few times in last few weeks prompting to admissions at [facility].  He was transferred to an unknown "rehab" where he was subsequently sent to [facility] for inpt psych treatment.    Pt will remain PEC'd, psychiatry following.  At time of d/c, pt will have support from LORI and Mr David Lopez should plan be to return home.  Should pt continue to require inpatient psychiatric care, team can place the d/c order with facility transfer orders for central psych placement either at [facility] return vs placement by centralized team.         Psychiatric Review Of Systems - Currently, the patient is endorsing and/or denying the following:  Attempted but unable to assess due to the patient's AMS / Delirium / Dementia      PSYCHOTHERAPY ADD-ON [phone]   30 (16-37*) minutes    Time: *** minutes  Participants: Met with patient    Therapeutic Intervention Type: behavior modifying psychotherapy, supportive psychotherapy  Why chosen therapy is " appropriate versus another modality: relevant to diagnosis, patient responds to this modality, evidence based practice    Target symptoms: {PSY TARGET SYMPTOMS:52090}  Primary focus: ***  Psychotherapeutic techniques: supportive and psychodynamic techniques; psycho-education; deep breathing exercises; CBT; problem solving techniques and managing life stressors    Outcome monitoring methods: self-report, observation    Patient's response to intervention:  The patient's response to intervention is {response:37536}.    Progress toward goals:  The patient's progress toward goals is {progress:82903}.      ROS (limited validity due to the patient's AMS / Delirium / Dementia):  General ROS: negative for - chills, fever or night sweats; positive for fatigue  Ophthalmic ROS: negative for - blurry vision, double vision or eye pain  ENT ROS: negative for - sinus pain, headaches, sore throat or visual changes  Allergy and Immunology ROS: negative for - hives, itchy/watery eyes or nasal congestion  Hematological and Lymphatic ROS: negative for - bleeding problems, bruising, jaundice or pallor  Endocrine ROS: negative for - galactorrhea, hot flashes, mood swings, palpitations or temperature intolerance  Respiratory ROS: negative for - cough, hemoptysis, shortness of breath, tachypnea or wheezing  Cardiovascular ROS: negative for - chest pain, dyspnea on exertion, loss of consciousness, palpitations, rapid heart rate or shortness of breath  Gastrointestinal ROS: negative for - appetite loss, nausea, abdominal pain, blood in stools, change in bowel habits, constipation or diarrhea  Genito-Urinary ROS: negative for - incontinence, nocturia or pelvic pain  Musculoskeletal ROS: negative for - joint stiffness, joint swelling, joint pain or muscle pain   Neurological ROS: negative for - dizziness, numbness/tingling or seizures; positive for behavioral changes, confusion, & memory loss  Dermatological ROS: negative for dry skin, hair  changes, pruritus or rash  Psychiatric ROS: see detailed psychiatric ROS above in subjective section       PAST MEDICAL & SURGICAL HISTORY   No past medical history on file.  No past surgical history on file.    FAMILY HISTORY   No family history on file.    ALLERGIES   Review of patient's allergies indicates:  No Known Allergies    CURRENT MEDICATION REGIMEN   Home Meds:   Prior to Admission medications    Medication Sig Start Date End Date Taking? Authorizing Provider   acetaminophen (TYLENOL) 650 MG TbSR Take 650 mg by mouth 4 (four) times daily as needed.   Yes Historical Provider   aluminum-magnesium hydroxide-simethicone (MAALOX) 200-200-20 mg/5 mL Susp Take 30 mLs by mouth every 4 (four) hours as needed (GI upset).   Yes Historical Provider   apixaban (ELIQUIS) 5 mg Tab Take 5 mg by mouth 2 (two) times daily.   Yes Historical Provider   atorvastatin (LIPITOR) 80 MG tablet Take 80 mg by mouth once daily.   Yes Historical Provider   DULoxetine (CYMBALTA) 20 MG capsule Take 40 mg by mouth once daily.   Yes Historical Provider   finasteride (PROSCAR) 5 mg tablet Take 5 mg by mouth once daily.   Yes Historical Provider   lisinopriL (PRINIVIL,ZESTRIL) 40 MG tablet Take 40 mg by mouth once daily.   Yes Historical Provider   magnesium hydroxide 400 mg/5 ml (MILK OF MAGNESIA) 400 mg/5 mL Susp Take 30 mLs by mouth daily as needed (constipation).   Yes Historical Provider   melatonin 5 mg TbDL Take 15 mg by mouth every evening.   Yes Historical Provider   metoprolol succinate (TOPROL-XL) 25 MG 24 hr tablet Take 25 mg by mouth once daily.   Yes Historical Provider   multivitamin (THERAGRAN) per tablet Take 1 tablet by mouth once daily.   Yes Historical Provider   nitroGLYCERIN (NITROSTAT) 0.4 MG SL tablet Place 0.4 mg under the tongue every 5 (five) minutes as needed for Chest pain.   Yes Historical Provider   spironolactone (ALDACTONE) 25 MG tablet Take 25 mg by mouth once daily.   Yes Historical Provider   thiamine 100  MG tablet Take 100 mg by mouth once daily.   Yes Historical Provider   traZODone (DESYREL) 50 MG tablet Take 25 mg by mouth every evening.   Yes Historical Provider       OTC Meds: ***    Scheduled Meds:    azithromycin  500 mg Intravenous Q24H    cefTRIAXone (ROCEPHIN) IVPB  1 g Intravenous Q24H      PRN Meds: albuterol-ipratropium, heparin (PORCINE), heparin (PORCINE), OLANZapine   Psychotherapeutics (From admission, onward)      Start     Stop Route Frequency Ordered    10/12/22 1542  OLANZapine injection 2.5 mg         -- IM Every 8 hours PRN 10/12/22 1442            LABORATORY DATA   Recent Results (from the past 72 hour(s))   Urinalysis, Reflex to Urine Culture Urine, Clean Catch    Collection Time: 10/11/22  9:09 PM    Specimen: Urine   Result Value Ref Range    Specimen UA Urine, Catheterized     Color, UA Yellow Yellow, Straw, Mari[place]    Appearance, UA Cloudy (A) Clear    pH, UA 7.0 5.0 - 8.0    Specific Gravity, UA 1.010 1.005 - 1.030    Protein, UA 1+ (A) Negative    Glucose, UA Negative Negative    Ketones, UA Trace (A) Negative    Bilirubin (UA) Negative Negative    Occult Blood UA 3+ (A) Negative    Nitrite, UA Negative Negative    Urobilinogen, UA Negative <2.0 EU/dL    Leukocytes, UA 3+ (A) Negative   Urinalysis Microscopic    Collection Time: 10/11/22  9:09 PM   Result Value Ref Range    RBC, UA >100 (H) 0 - 4 /hpf    WBC, UA >100 (H) 0 - 5 /hpf    Bacteria Moderate (A) None-Occ /hpf    Hyaline Casts, UA 0 0-1/lpf /lpf    Microscopic Comment SEE COMMENT    Urine culture    Collection Time: 10/11/22  9:09 PM    Specimen: Urine   Result Value Ref Range    Urine Culture, Routine Further report to follow    Influenza A & B by Molecular    Collection Time: 10/11/22  9:10 PM    Specimen: Nasopharyngeal Swab   Result Value Ref Range    Influenza A, Molecular Negative Negative    Influenza B, Molecular Negative Negative    Flu A & B Source NP    CBC auto differential    Collection Time: 10/11/22  9:23 PM    Result Value Ref Range    WBC 16.91 (H) 3.90 - 12.70 K/uL    RBC 3.73 (L) 4.60 - 6.20 M/uL    Hemoglobin 12.1 (L) 14.0 - 18.0 g/dL    Hematocrit 39.6 (L) 40.0 - 54.0 %     (H) 82 - 98 fL    MCH 32.4 (H) 27.0 - 31.0 pg    MCHC 30.6 (L) 32.0 - 36.0 g/dL    RDW 15.2 (H) 11.5 - 14.5 %    Platelets 386 150 - 450 K/uL    MPV 9.4 9.2 - 12.9 fL    Immature Granulocytes 0.5 0.0 - 0.5 %    Gran # (ANC) 13.4 (H) 1.8 - 7.7 K/uL    Immature Grans (Abs) 0.08 (H) 0.00 - 0.04 K/uL    Lymph # 2.0 1.0 - 4.8 K/uL    Mono # 1.3 (H) 0.3 - 1.0 K/uL    Eos # 0.0 0.0 - 0.5 K/uL    Baso # 0.03 0.00 - 0.20 K/uL    nRBC 0 0 /100 WBC    Gran % 79.5 (H) 38.0 - 73.0 %    Lymph % 11.9 (L) 18.0 - 48.0 %    Mono % 7.7 4.0 - 15.0 %    Eosinophil % 0.2 0.0 - 8.0 %    Basophil % 0.2 0.0 - 1.9 %    Differential Method Automated    Comprehensive metabolic panel    Collection Time: 10/11/22  9:23 PM   Result Value Ref Range    Sodium 141 136 - 145 mmol/L    Potassium 5.2 (H) 3.5 - 5.1 mmol/L    Chloride 113 (H) 95 - 110 mmol/L    CO2 17 (L) 23 - 29 mmol/L    Glucose 105 70 - 110 mg/dL    BUN 53 (H) 8 - 23 mg/dL    Creatinine 2.2 (H) 0.5 - 1.4 mg/dL    Calcium 10.9 (H) 8.7 - 10.5 mg/dL    Total Protein 6.9 6.0 - 8.4 g/dL    Albumin 3.6 3.5 - 5.2 g/dL    Total Bilirubin 0.7 0.1 - 1.0 mg/dL    Alkaline Phosphatase 111 55 - 135 U/L    AST 29 10 - 40 U/L    ALT 23 10 - 44 U/L    Anion Gap 11 8 - 16 mmol/L    eGFR 30 (A) >60 mL/min/1.73 m^2   Troponin I    Collection Time: 10/11/22  9:23 PM   Result Value Ref Range    Troponin I 0.120 (H) 0.000 - 0.026 ng/mL   TSH    Collection Time: 10/11/22  9:23 PM   Result Value Ref Range    TSH 1.414 0.400 - 4.000 uIU/mL   Blood culture    Collection Time: 10/11/22 11:30 PM    Specimen: Peripheral, Hand, Right; Blood   Result Value Ref Range    Blood Culture, Routine No Growth to date     Blood Culture, Routine No Growth to date     Blood Culture, Routine No Growth to date    Blood culture    Collection Time:  10/11/22 11:40 PM    Specimen: Peripheral, Hand, Left; Blood   Result Value Ref Range    Blood Culture, Routine No Growth to date     Blood Culture, Routine No Growth to date     Blood Culture, Routine No Growth to date    Lactic acid, plasma    Collection Time: 10/11/22 11:45 PM   Result Value Ref Range    Lactate (Lactic Acid) 1.0 0.5 - 2.2 mmol/L   Magnesium    Collection Time: 10/12/22  2:37 AM   Result Value Ref Range    Magnesium 2.3 1.6 - 2.6 mg/dL   Phosphorus    Collection Time: 10/12/22  2:37 AM   Result Value Ref Range    Phosphorus 3.5 2.7 - 4.5 mg/dL   Troponin I    Collection Time: 10/12/22  4:11 AM   Result Value Ref Range    Troponin I 0.086 (H) 0.000 - 0.026 ng/mL   COVID-19 Rapid Screening    Collection Time: 10/12/22  4:49 AM   Result Value Ref Range    SARS-CoV-2 RNA, Amplification, Qual Negative Negative   Troponin I    Collection Time: 10/12/22  8:35 AM   Result Value Ref Range    Troponin I 0.101 (H) 0.000 - 0.026 ng/mL   Echo    Collection Time: 10/12/22 10:32 AM   Result Value Ref Range    BSA 1.76 m2    LA WIDTH 3.60 cm    Left Ventricular Outflow Tract Mean Velocity 0.61 cm/s    Left Ventricular Outflow Tract Mean Gradient 1.59 mmHg    LVIDd 4.26 3.5 - 6.0 cm    IVS 0.66 0.6 - 1.1 cm    Posterior Wall 0.91 0.6 - 1.1 cm    LVIDs 2.75 2.1 - 4.0 cm    FS 35 28 - 44 %    LA volume 61.48 cm3    STJ 2.90 cm    LV mass 101.14 g    LA size 3.17 cm    RVDD 2.79 cm    RV S' 0.01 cm/s    Left Ventricle Relative Wall Thickness 0.43 cm    AV mean gradient 11 mmHg    AV valve area 1.36 cm2    AV Velocity Ratio 0.37     AV index (prosthetic) 0.41     MV mean gradient 2 mmHg    MV valve area by continuity eq 1.25 cm2    IVRT 125.59 msec    Pulm vein S/D ratio 1.06     LVOT diameter 2.06 cm    LVOT area 3.3 cm2    LVOT peak dean 0.79 m/s    LVOT peak VTI 14.20 cm    Ao peak dean 2.16 m/s    Ao VTI 34.7 cm    LVOT stroke volume 47.30 cm3    AV peak gradient 19 mmHg    MV peak gradient 6 mmHg    TR Max Dean  2.72 m/s    MV VTI 37.7 cm    PV Peak S Dean 0.33 m/s    PV Peak D Dean 0.31 m/s    LV Systolic Volume 28.17 mL    LV Systolic Volume Index 16.0 mL/m2    LV Diastolic Volume 81.04 mL    LV Diastolic Volume Index 46.05 mL/m2    LA Volume Index 34.9 mL/m2    LV Mass Index 57 g/m2    RA Major Axis 6.05 cm    Left Atrium Minor Axis 6.63 cm    Left Atrium Major Axis 6.07 cm    Triscuspid Valve Regurgitation Peak Gradient 30 mmHg    LA Volume Index (Mod) 36.7 mL/m2    LA volume (mod) 64.61 cm3    RA Width 3.70 cm    Right Atrial Pressure (from IVC) 15 mmHg    EF 55 %    TV rest pulmonary artery pressure 45 mmHg   Lithium level    Collection Time: 10/12/22 12:27 PM   Result Value Ref Range    Lithium Level 1.1 0.6 - 1.2 mmol/L   Ammonia    Collection Time: 10/12/22 12:27 PM   Result Value Ref Range    Ammonia 26 10 - 50 umol/L   CBC Auto Differential    Collection Time: 10/13/22  2:50 AM   Result Value Ref Range    WBC 15.29 (H) 3.90 - 12.70 K/uL    RBC 3.57 (L) 4.60 - 6.20 M/uL    Hemoglobin 11.5 (L) 14.0 - 18.0 g/dL    Hematocrit 37.6 (L) 40.0 - 54.0 %     (H) 82 - 98 fL    MCH 32.2 (H) 27.0 - 31.0 pg    MCHC 30.6 (L) 32.0 - 36.0 g/dL    RDW 15.5 (H) 11.5 - 14.5 %    Platelets 381 150 - 450 K/uL    MPV 10.1 9.2 - 12.9 fL    Immature Granulocytes 0.5 0.0 - 0.5 %    Gran # (ANC) 12.1 (H) 1.8 - 7.7 K/uL    Immature Grans (Abs) 0.07 (H) 0.00 - 0.04 K/uL    Lymph # 1.9 1.0 - 4.8 K/uL    Mono # 1.1 (H) 0.3 - 1.0 K/uL    Eos # 0.1 0.0 - 0.5 K/uL    Baso # 0.03 0.00 - 0.20 K/uL    nRBC 0 0 /100 WBC    Gran % 79.0 (H) 38.0 - 73.0 %    Lymph % 12.6 (L) 18.0 - 48.0 %    Mono % 7.3 4.0 - 15.0 %    Eosinophil % 0.4 0.0 - 8.0 %    Basophil % 0.2 0.0 - 1.9 %    Differential Method Automated    Basic Metabolic Panel    Collection Time: 10/13/22  2:50 AM   Result Value Ref Range    Sodium 148 (H) 136 - 145 mmol/L    Potassium 4.1 3.5 - 5.1 mmol/L    Chloride 122 (H) 95 - 110 mmol/L    CO2 16 (L) 23 - 29 mmol/L    Glucose 91 70 - 110  mg/dL    BUN 40 (H) 8 - 23 mg/dL    Creatinine 1.6 (H) 0.5 - 1.4 mg/dL    Calcium 10.0 8.7 - 10.5 mg/dL    Anion Gap 10 8 - 16 mmol/L    eGFR 44 (A) >60 mL/min/1.73 m^2   Magnesium    Collection Time: 10/13/22  2:50 AM   Result Value Ref Range    Magnesium 2.2 1.6 - 2.6 mg/dL   Comprehensive Metabolic Panel    Collection Time: 10/13/22  2:50 AM   Result Value Ref Range    Sodium 148 (H) 136 - 145 mmol/L    Potassium 4.1 3.5 - 5.1 mmol/L    Chloride 122 (H) 95 - 110 mmol/L    CO2 16 (L) 23 - 29 mmol/L    Glucose 91 70 - 110 mg/dL    BUN 40 (H) 8 - 23 mg/dL    Creatinine 1.6 (H) 0.5 - 1.4 mg/dL    Calcium 10.0 8.7 - 10.5 mg/dL    Total Protein 6.1 6.0 - 8.4 g/dL    Albumin 3.1 (L) 3.5 - 5.2 g/dL    Total Bilirubin 0.6 0.1 - 1.0 mg/dL    Alkaline Phosphatase 102 55 - 135 U/L    AST 25 10 - 40 U/L    ALT 22 10 - 44 U/L    Anion Gap 10 8 - 16 mmol/L    eGFR 44 (A) >60 mL/min/1.73 m^2   Vitamin D    Collection Time: 10/13/22 12:40 PM   Result Value Ref Range    Vit D, 25-Hydroxy 14 (L) 30 - 96 ng/mL   Sodium    Collection Time: 10/13/22 12:51 PM   Result Value Ref Range    Sodium 148 (H) 136 - 145 mmol/L   APTT    Collection Time: 10/13/22  1:36 PM   Result Value Ref Range    aPTT 31.3 21.0 - 32.0 sec   Protime-INR    Collection Time: 10/13/22  1:36 PM   Result Value Ref Range    Prothrombin Time 12.3 9.0 - 12.5 sec    INR 1.2 0.8 - 1.2   Sodium    Collection Time: 10/13/22  5:24 PM   Result Value Ref Range    Sodium 145 136 - 145 mmol/L   APTT    Collection Time: 10/13/22  9:35 PM   Result Value Ref Range    aPTT 107.6 (H) 21.0 - 32.0 sec   APTT    Collection Time: 10/13/22 11:53 PM   Result Value Ref Range    aPTT 85.2 (H) 21.0 - 32.0 sec   CBC auto differential    Collection Time: 10/14/22  3:20 AM   Result Value Ref Range    WBC 13.53 (H) 3.90 - 12.70 K/uL    RBC 3.27 (L) 4.60 - 6.20 M/uL    Hemoglobin 10.5 (L) 14.0 - 18.0 g/dL    Hematocrit 34.6 (L) 40.0 - 54.0 %     (H) 82 - 98 fL    MCH 32.1 (H) 27.0 - 31.0  pg    MCHC 30.3 (L) 32.0 - 36.0 g/dL    RDW 15.5 (H) 11.5 - 14.5 %    Platelets 356 150 - 450 K/uL    MPV 9.6 9.2 - 12.9 fL    Immature Granulocytes 0.5 0.0 - 0.5 %    Gran # (ANC) 10.1 (H) 1.8 - 7.7 K/uL    Immature Grans (Abs) 0.07 (H) 0.00 - 0.04 K/uL    Lymph # 2.2 1.0 - 4.8 K/uL    Mono # 1.0 0.3 - 1.0 K/uL    Eos # 0.1 0.0 - 0.5 K/uL    Baso # 0.03 0.00 - 0.20 K/uL    nRBC 0 0 /100 WBC    Gran % 75.0 (H) 38.0 - 73.0 %    Lymph % 16.4 (L) 18.0 - 48.0 %    Mono % 7.2 4.0 - 15.0 %    Eosinophil % 0.7 0.0 - 8.0 %    Basophil % 0.2 0.0 - 1.9 %    Differential Method Automated    Comprehensive Metabolic Panel    Collection Time: 10/14/22  3:21 AM   Result Value Ref Range    Sodium 147 (H) 136 - 145 mmol/L    Potassium 4.5 3.5 - 5.1 mmol/L    Chloride 120 (H) 95 - 110 mmol/L    CO2 19 (L) 23 - 29 mmol/L    Glucose 173 (H) 70 - 110 mg/dL    BUN 28 (H) 8 - 23 mg/dL    Creatinine 1.4 0.5 - 1.4 mg/dL    Calcium 10.1 8.7 - 10.5 mg/dL    Total Protein 5.3 (L) 6.0 - 8.4 g/dL    Albumin 2.8 (L) 3.5 - 5.2 g/dL    Total Bilirubin 0.5 0.1 - 1.0 mg/dL    Alkaline Phosphatase 94 55 - 135 U/L    AST 17 10 - 40 U/L    ALT 21 10 - 44 U/L    Anion Gap 8 8 - 16 mmol/L    eGFR 51 (A) >60 mL/min/1.73 m^2   Magnesium    Collection Time: 10/14/22  3:21 AM   Result Value Ref Range    Magnesium 1.9 1.6 - 2.6 mg/dL   APTT    Collection Time: 10/14/22  7:49 AM   Result Value Ref Range    aPTT 56.6 (H) 21.0 - 32.0 sec      No results found for: PHENYTOIN, PHENOBARB, VALPROATE, CBMZ      EXAMINATION    VITALS   Vitals:    10/14/22 0006 10/14/22 0400 10/14/22 0500 10/14/22 0708   BP:       BP Location:       Patient Position:       Pulse: 80 84     Resp:       Temp:       TempSrc:       SpO2:    96%   Weight:   62.7 kg (138 lb 3.7 oz)    Height:            CONSTITUTIONAL  General Appearance: NAD, unremarkable, age appropriate, disheveled, somnolent, confused    MUSCULOSKELETAL  Muscle Strength and Tone:  attempted but unable to assess due to  patient's AMS / Delirium / Dementia  Abnormal Involuntary Movements: tremors in BUEs   Gait and Station:  attempted but unable to assess due to patient's AMS / Delirium / Dementia    PSYCHIATRIC   Behavior/Cooperaton: Reluctant to participate, uncooperative, unable to make eye contact   Speech:  slowed, dysarthria, increased latency of response, soft   Language: grossly intact with spontaneous speech  Mood:  attempted but unable to assess due to patient's AMS / Delirium / Dementia  Affect:  blunted  Associations: + ASHWIN  Thought Process: Confused, disorganized  Thought Content:  attempted but unable to assess due to patient's AMS / Delirium / Dementia  Sensorium:  Delirium/Somnolence  Alert and Oriented: to self only. Disoriented to name of place, type of place, city, state, month, year, situation  Memory:  attempted but unable to assess due to patient's AMS / Delirium / Dementia  Attention/concentration:  Impaired / Limited   Similarities:   attempted but unable to assess due to patient's AMS / Delirium / Dementia  Abstract reasoning:  attempted but unable to assess due to patient's AMS / Delirium / Dementia  Insight:  Impaired /  Limited   Judgment: Impaired /  Limited     CAM ICU Delirium Assessment - POSITIVE       MEDICAL DECISION MAKING    ASSESSMENT      Acute Encephalopathy due to Infection   Delirium due to Medical Condition with Behavioral Disturbance  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      - Plan for discharge back to [facility] once medically cleared    - Start Depacon 250 mg IV TID for mood/behavior when able to (currently not available at the pharmacy due to nationwide shortage)       - Continue to hold Lithium and Haldol due to ALICIA      - Continue delirium behavioral management      - Psychotherapy was performed with patient as noted above      - Please have CM/SW assist patient with outpatient mental health f/u for s/p discharge from this facility      - Patient was instructed to call 911 and return to the nearest ED if he/she begins feeling suicidal, homicidal, or gravely disabled         Time spent with patient and/or on unit managing/coordinating patient's care (excluding the time spent on psychotherapy): 35 minutes      More than 50% of the time was spent counseling/coordinating care        Ramez Zarate, MS3  10/14/2022

## 2022-10-14 NOTE — PROGRESS NOTES
PSYCHIATRY INPATIENT PROGRESS NOTE  SUBSEQUENT HOSPITAL VISIT      10/14/2022 7:00 AM   Cheikh Yanez   1940   [id number]           DATE OF ADMISSION: 10/11/2022  8:28 PM    SITE: [facility]    CURRENT LEGAL STATUS: Non-Contested Admission Status         HISTORY     Per Initial History from Primary Team:   Chief Complaint:               Patient presents with    Altered Mental Status       Pt sent from [facility] for eval of AMS. Per Chani at [facility], pt sent for AMS, states that pt has been somnolent on today and had blood work that showed an ALICIA. Chani states that the doctor also noted abnormal lung sounds. Per Chani, pt baseline mentation is oriented to self and sometimes situation only. Pt currently a PEC for SI.     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 Patient unable to provide any history. Per RN, patient normally oriented to self and now is somnolent.  ED findings:  WBCs 16.91, potassium 5.2, creatinine 2.2, troponin 0.120, EKG without ischemic changes, u/a concerning for infectious process,  chest x-ray showed a right basilar airspace opacity, covid pending, blood cultures pending.  Patient admitted to hospital medicine observation unit for further medical management.   Overview/Hospital Course from Primary Team:  Pt admitted for AMS, pt sent from [facility].  PEC in place.  UA notable for infection, pt started on ceftriaxone.  Psychiatry consulted, PEC discontinued.  It was recommended to hold lithium and haldol with ALICIA.  Depacon recommended however nationwide supply chain issue and no reasonable alternative.  Zyprexa prn agitation.  Folate/Thiamine/MVI.  RD consult as pt failed swallow assessment.  Neurology consult for encephalopathy.  10/13 UC with multiple organisms none in predominance, micro  "lab will review plate again 10/13.  Interval History from Primary Team on 10/13/2022:  no acute events overnight, pt lethargic and sitter at bedside.  Pt failed swallow study.  RN this afternoon reports improvement in mentation, answering yes and no questions.       Chief Complaint / Reason for Original Psychiatry Consult: AMS / PEC/CEC from [facility]        Subjective / Interval Psychiatric History Today (10/14/2022) (with Psychiatric ROS below):  Cheikh Yanez is a 81 y.o. 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  Psychiatry was originally consulted as noted above.  The patient was seen and examined again today.  The chart was reviewed again today.  No PRN Zyprexa was needed overnight.  On examination today, the patient was slightly more alert when compared to yesterday and remains only oriented to self.  He was again disoriented to name of place, type of place, city, state, month, year, and situation.  He was again CAM-ICU positive for delirium.  He exhibited more illogical / nonsensical mumbling today.  He continues to appear confused.  He was able to open his eyes and make eye contact with repeated verbal command.  He again denied any current medical/physical complaints, but the validity of this remains notably limited due to the patient's AMS / Delirium / Dementia (limited mumbling of what appears to be "No" with an intermittent no head shake).  NAD was observed during the examination.  Despite multiple attempts, the comprehensive psychiatric assessment was again notably limited due to the patient's AMS / Delirium / Dementia.  Psychotherapy was again implemented with a focus on improving orientation, confusion, mood / irritability, and behavior.  See detailed " "psych ROS below. See collateral info below.  See A/P below.       Collateral:   I spoke to the patient's main caregiver (at [phone]).  He states that they live in a double shotgun-style home in the [place], [place].  He states that the patient has a psychiatric hx of Bipolar I Disorder and Dementia.  He states that even at baseline, the patient is often only oriented to person and situation.  He endorses that the patient has had a gradual decline in neurocognition over the past year.  He states that the patient has historically taken Lithium 300 mg PO BID for Bipolar Disorder.  He is unaware of the name of the patient's psychiatrist.  He has been struggling with delirium on top of underlying dementia since a hospital admission at [facility] in August 2022.    Per CM on 10/12/22:  Pt lying in bed with sitter at bedside, disoriented, confused, mumbling unintelligible words, unable to participate in assessment.  Contacted POA Jc Melgar [phone](lives in [place], [place]).  Mr. Melgar shares POA with  Benito Jaquez [phone]  who lives 1 1/2 block from pt.  States pt lives with David Lopez [phone] who has been his tenant and caretaker for past 6 months.  POA states in the past 2 yrs, friends have noticed deterioration in health.  Smokes 2pk cigarettes per day and $300 monthly marijuana habit.  Pt has a hx of Bipolar disorder that usually take lithium for.  David oversees medication administration daily, pt receives individually wrapped from mail order pharmacy.  Mr Alvarez states the over the past few months, pt "lost the ability to walk, run and lives in an almost vegetative state."  He reports pt has fallen a few times in last few weeks prompting to admissions at [facility].  He was transferred to an unknown "rehab" where he was subsequently sent to [facility] for inpt psych treatment.    Pt will remain PEC'd, psychiatry following.  At time of d/c, pt will have " support from LORI and Mr David Lopez should plan be to return home.  Should pt continue to require inpatient psychiatric care, team can place the d/c order with facility transfer orders for central psych placement either at [facility] return vs placement by centralized team.         Psychiatric Review Of Systems - Currently, the patient is endorsing and/or denying the following:  Attempted but unable to assess due to the patient's AMS / Delirium / Dementia        PSYCHOTHERAPY ADD-ON [id number]   30 (16-37*) minutes     Time: 17 minutes  Participants: Met with patient     Therapeutic Intervention Type: behavior modifying psychotherapy, supportive psychotherapy  Why chosen therapy is appropriate versus another modality: relevant to diagnosis, patient responds to this modality, evidence based practice     Target symptoms: disorientation, confusion, mood / irritability, and behavioral disturbances   Primary focus: improving orientation, confusion, mood / irritability, and behavior  Psychotherapeutic techniques: supportive and psychodynamic techniques; psycho-education; reorientation ; behavioral modification ; reality / insight orientation ; problem solving techniques and managing life/medical stressors     Outcome monitoring methods: self-report, observation     Patient's response to intervention:  The patient's response to intervention is impaired / limited.     Progress toward goals:  The patient's progress toward goals is impaired / limited.          ROS (limited validity due to the patient's AMS / Delirium / Dementia):  General ROS: negative for - chills, fever or night sweats; positive for fatigue  Ophthalmic ROS: negative for - blurry vision, double vision or eye pain  ENT ROS: negative for - sinus pain, headaches, sore throat or visual changes  Allergy and Immunology ROS: negative for - hives, itchy/watery eyes or nasal congestion  Hematological and Lymphatic ROS: negative for - bleeding problems,  bruising, jaundice or pallor  Endocrine ROS: negative for - galactorrhea, hot flashes, mood swings, palpitations or temperature intolerance  Respiratory ROS: negative for - cough, hemoptysis, shortness of breath, tachypnea or wheezing  Cardiovascular ROS: negative for - chest pain, dyspnea on exertion, loss of consciousness, palpitations, rapid heart rate or shortness of breath  Gastrointestinal ROS: negative for - appetite loss, nausea, abdominal pain, blood in stools, change in bowel habits, constipation or diarrhea  Genito-Urinary ROS: negative for - incontinence, nocturia or pelvic pain  Musculoskeletal ROS: negative for - joint stiffness, joint swelling, joint pain or muscle pain   Neurological ROS: negative for - dizziness, numbness/tingling or seizures; positive for behavioral changes, confusion, & memory loss  Dermatological ROS: negative for dry skin, hair changes, pruritus or rash  Psychiatric ROS: see detailed psychiatric ROS above in subjective section         PAST MEDICAL & SURGICAL HISTORY   No past medical history on file.  No past surgical history on file.     NEUROLOGIC HISTORY  Seizures: attempted but unable to assess due to patient's AMS / Delirium / Dementia  Head trauma: attempted but unable to assess due to patient's AMS / Delirium / Dementia  CVA: yes     FAMILY HISTORY   No family history on file.    ALLERGIES   Review of patient's allergies indicates:  No Known Allergies    CURRENT MEDICATION REGIMEN   Home Meds:   Prior to Admission medications    Medication Sig Start Date End Date Taking? Authorizing Provider   acetaminophen (TYLENOL) 650 MG TbSR Take 650 mg by mouth 4 (four) times daily as needed.   Yes Historical Provider   aluminum-magnesium hydroxide-simethicone (MAALOX) 200-200-20 mg/5 mL Susp Take 30 mLs by mouth every 4 (four) hours as needed (GI upset).   Yes Historical Provider   apixaban (ELIQUIS) 5 mg Tab Take 5 mg by mouth 2 (two) times daily.   Yes Historical Provider    atorvastatin (LIPITOR) 80 MG tablet Take 80 mg by mouth once daily.   Yes Historical Provider   DULoxetine (CYMBALTA) 20 MG capsule Take 40 mg by mouth once daily.   Yes Historical Provider   finasteride (PROSCAR) 5 mg tablet Take 5 mg by mouth once daily.   Yes Historical Provider   lisinopriL (PRINIVIL,ZESTRIL) 40 MG tablet Take 40 mg by mouth once daily.   Yes Historical Provider   magnesium hydroxide 400 mg/5 ml (MILK OF MAGNESIA) 400 mg/5 mL Susp Take 30 mLs by mouth daily as needed (constipation).   Yes Historical Provider   melatonin 5 mg TbDL Take 15 mg by mouth every evening.   Yes Historical Provider   metoprolol succinate (TOPROL-XL) 25 MG 24 hr tablet Take 25 mg by mouth once daily.   Yes Historical Provider   multivitamin (THERAGRAN) per tablet Take 1 tablet by mouth once daily.   Yes Historical Provider   nitroGLYCERIN (NITROSTAT) 0.4 MG SL tablet Place 0.4 mg under the tongue every 5 (five) minutes as needed for Chest pain.   Yes Historical Provider   spironolactone (ALDACTONE) 25 MG tablet Take 25 mg by mouth once daily.   Yes Historical Provider   thiamine 100 MG tablet Take 100 mg by mouth once daily.   Yes Historical Provider   traZODone (DESYREL) 50 MG tablet Take 25 mg by mouth every evening.   Yes Historical Provider       Scheduled Meds:    azithromycin  500 mg Intravenous Q24H    cefTRIAXone (ROCEPHIN) IVPB  1 g Intravenous Q24H      PRN Meds: albuterol-ipratropium, heparin (PORCINE), heparin (PORCINE), OLANZapine   Psychotherapeutics (From admission, onward)      Start     Stop Route Frequency Ordered    10/12/22 1542  OLANZapine injection 2.5 mg         -- IM Every 8 hours PRN 10/12/22 1442            LABORATORY DATA   Recent Results (from the past 72 hour(s))   Urinalysis, Reflex to Urine Culture Urine, Clean Catch    Collection Time: 10/11/22  9:09 PM    Specimen: Urine   Result Value Ref Range    Specimen UA Urine, Catheterized     Color, UA Yellow Yellow, Straw, [place]    Appearance,  UA Cloudy (A) Clear    pH, UA 7.0 5.0 - 8.0    Specific Gravity, UA 1.010 1.005 - 1.030    Protein, UA 1+ (A) Negative    Glucose, UA Negative Negative    Ketones, UA Trace (A) Negative    Bilirubin (UA) Negative Negative    Occult Blood UA 3+ (A) Negative    Nitrite, UA Negative Negative    Urobilinogen, UA Negative <2.0 EU/dL    Leukocytes, UA 3+ (A) Negative   Urinalysis Microscopic    Collection Time: 10/11/22  9:09 PM   Result Value Ref Range    RBC, UA >100 (H) 0 - 4 /hpf    WBC, UA >100 (H) 0 - 5 /hpf    Bacteria Moderate (A) None-Occ /hpf    Hyaline Casts, UA 0 0-1/lpf /lpf    Microscopic Comment SEE COMMENT    Urine culture    Collection Time: 10/11/22  9:09 PM    Specimen: Urine   Result Value Ref Range    Urine Culture, Routine Further report to follow    Influenza A & B by Molecular    Collection Time: 10/11/22  9:10 PM    Specimen: Nasopharyngeal Swab   Result Value Ref Range    Influenza A, Molecular Negative Negative    Influenza B, Molecular Negative Negative    Flu A & B Source NP    CBC auto differential    Collection Time: 10/11/22  9:23 PM   Result Value Ref Range    WBC 16.91 (H) 3.90 - 12.70 K/uL    RBC 3.73 (L) 4.60 - 6.20 M/uL    Hemoglobin 12.1 (L) 14.0 - 18.0 g/dL    Hematocrit 39.6 (L) 40.0 - 54.0 %     (H) 82 - 98 fL    MCH 32.4 (H) 27.0 - 31.0 pg    MCHC 30.6 (L) 32.0 - 36.0 g/dL    RDW 15.2 (H) 11.5 - 14.5 %    Platelets 386 150 - 450 K/uL    MPV 9.4 9.2 - 12.9 fL    Immature Granulocytes 0.5 0.0 - 0.5 %    Gran # (ANC) 13.4 (H) 1.8 - 7.7 K/uL    Immature Grans (Abs) 0.08 (H) 0.00 - 0.04 K/uL    Lymph # 2.0 1.0 - 4.8 K/uL    Mono # 1.3 (H) 0.3 - 1.0 K/uL    Eos # 0.0 0.0 - 0.5 K/uL    Baso # 0.03 0.00 - 0.20 K/uL    nRBC 0 0 /100 WBC    Gran % 79.5 (H) 38.0 - 73.0 %    Lymph % 11.9 (L) 18.0 - 48.0 %    Mono % 7.7 4.0 - 15.0 %    Eosinophil % 0.2 0.0 - 8.0 %    Basophil % 0.2 0.0 - 1.9 %    Differential Method Automated    Comprehensive metabolic panel    Collection Time: 10/11/22   9:23 PM   Result Value Ref Range    Sodium 141 136 - 145 mmol/L    Potassium 5.2 (H) 3.5 - 5.1 mmol/L    Chloride 113 (H) 95 - 110 mmol/L    CO2 17 (L) 23 - 29 mmol/L    Glucose 105 70 - 110 mg/dL    BUN 53 (H) 8 - 23 mg/dL    Creatinine 2.2 (H) 0.5 - 1.4 mg/dL    Calcium 10.9 (H) 8.7 - 10.5 mg/dL    Total Protein 6.9 6.0 - 8.4 g/dL    Albumin 3.6 3.5 - 5.2 g/dL    Total Bilirubin 0.7 0.1 - 1.0 mg/dL    Alkaline Phosphatase 111 55 - 135 U/L    AST 29 10 - 40 U/L    ALT 23 10 - 44 U/L    Anion Gap 11 8 - 16 mmol/L    eGFR 30 (A) >60 mL/min/1.73 m^2   Troponin I    Collection Time: 10/11/22  9:23 PM   Result Value Ref Range    Troponin I 0.120 (H) 0.000 - 0.026 ng/mL   TSH    Collection Time: 10/11/22  9:23 PM   Result Value Ref Range    TSH 1.414 0.400 - 4.000 uIU/mL   Blood culture    Collection Time: 10/11/22 11:30 PM    Specimen: Peripheral, Hand, Right; Blood   Result Value Ref Range    Blood Culture, Routine No Growth to date     Blood Culture, Routine No Growth to date     Blood Culture, Routine No Growth to date    Blood culture    Collection Time: 10/11/22 11:40 PM    Specimen: Peripheral, Hand, Left; Blood   Result Value Ref Range    Blood Culture, Routine No Growth to date     Blood Culture, Routine No Growth to date     Blood Culture, Routine No Growth to date    Lactic acid, plasma    Collection Time: 10/11/22 11:45 PM   Result Value Ref Range    Lactate (Lactic Acid) 1.0 0.5 - 2.2 mmol/L   Magnesium    Collection Time: 10/12/22  2:37 AM   Result Value Ref Range    Magnesium 2.3 1.6 - 2.6 mg/dL   Phosphorus    Collection Time: 10/12/22  2:37 AM   Result Value Ref Range    Phosphorus 3.5 2.7 - 4.5 mg/dL   Troponin I    Collection Time: 10/12/22  4:11 AM   Result Value Ref Range    Troponin I 0.086 (H) 0.000 - 0.026 ng/mL   COVID-19 Rapid Screening    Collection Time: 10/12/22  4:49 AM   Result Value Ref Range    SARS-CoV-2 RNA, Amplification, Qual Negative Negative   Troponin I    Collection Time:  10/12/22  8:35 AM   Result Value Ref Range    Troponin I 0.101 (H) 0.000 - 0.026 ng/mL   Echo    Collection Time: 10/12/22 10:32 AM   Result Value Ref Range    BSA 1.76 m2    LA WIDTH 3.60 cm    Left Ventricular Outflow Tract Mean Velocity 0.61 cm/s    Left Ventricular Outflow Tract Mean Gradient 1.59 mmHg    LVIDd 4.26 3.5 - 6.0 cm    IVS 0.66 0.6 - 1.1 cm    Posterior Wall 0.91 0.6 - 1.1 cm    LVIDs 2.75 2.1 - 4.0 cm    FS 35 28 - 44 %    LA volume 61.48 cm3    STJ 2.90 cm    LV mass 101.14 g    LA size 3.17 cm    RVDD 2.79 cm    RV S' 0.01 cm/s    Left Ventricle Relative Wall Thickness 0.43 cm    AV mean gradient 11 mmHg    AV valve area 1.36 cm2    AV Velocity Ratio 0.37     AV index (prosthetic) 0.41     MV mean gradient 2 mmHg    MV valve area by continuity eq 1.25 cm2    IVRT 125.59 msec    Pulm vein S/D ratio 1.06     LVOT diameter 2.06 cm    LVOT area 3.3 cm2    LVOT peak dean 0.79 m/s    LVOT peak VTI 14.20 cm    Ao peak dean 2.16 m/s    Ao VTI 34.7 cm    LVOT stroke volume 47.30 cm3    AV peak gradient 19 mmHg    MV peak gradient 6 mmHg    TR Max Dean 2.72 m/s    MV VTI 37.7 cm    PV Peak S Dean 0.33 m/s    PV Peak D Dean 0.31 m/s    LV Systolic Volume 28.17 mL    LV Systolic Volume Index 16.0 mL/m2    LV Diastolic Volume 81.04 mL    LV Diastolic Volume Index 46.05 mL/m2    LA Volume Index 34.9 mL/m2    LV Mass Index 57 g/m2    RA Major Axis 6.05 cm    Left Atrium Minor Axis 6.63 cm    Left Atrium Major Axis 6.07 cm    Triscuspid Valve Regurgitation Peak Gradient 30 mmHg    LA Volume Index (Mod) 36.7 mL/m2    LA volume (mod) 64.61 cm3    RA Width 3.70 cm    Right Atrial Pressure (from IVC) 15 mmHg    EF 55 %    TV rest pulmonary artery pressure 45 mmHg   Lithium level    Collection Time: 10/12/22 12:27 PM   Result Value Ref Range    Lithium Level 1.1 0.6 - 1.2 mmol/L   Ammonia    Collection Time: 10/12/22 12:27 PM   Result Value Ref Range    Ammonia 26 10 - 50 umol/L   CBC Auto Differential    Collection Time:  10/13/22  2:50 AM   Result Value Ref Range    WBC 15.29 (H) 3.90 - 12.70 K/uL    RBC 3.57 (L) 4.60 - 6.20 M/uL    Hemoglobin 11.5 (L) 14.0 - 18.0 g/dL    Hematocrit 37.6 (L) 40.0 - 54.0 %     (H) 82 - 98 fL    MCH 32.2 (H) 27.0 - 31.0 pg    MCHC 30.6 (L) 32.0 - 36.0 g/dL    RDW 15.5 (H) 11.5 - 14.5 %    Platelets 381 150 - 450 K/uL    MPV 10.1 9.2 - 12.9 fL    Immature Granulocytes 0.5 0.0 - 0.5 %    Gran # (ANC) 12.1 (H) 1.8 - 7.7 K/uL    Immature Grans (Abs) 0.07 (H) 0.00 - 0.04 K/uL    Lymph # 1.9 1.0 - 4.8 K/uL    Mono # 1.1 (H) 0.3 - 1.0 K/uL    Eos # 0.1 0.0 - 0.5 K/uL    Baso # 0.03 0.00 - 0.20 K/uL    nRBC 0 0 /100 WBC    Gran % 79.0 (H) 38.0 - 73.0 %    Lymph % 12.6 (L) 18.0 - 48.0 %    Mono % 7.3 4.0 - 15.0 %    Eosinophil % 0.4 0.0 - 8.0 %    Basophil % 0.2 0.0 - 1.9 %    Differential Method Automated    Basic Metabolic Panel    Collection Time: 10/13/22  2:50 AM   Result Value Ref Range    Sodium 148 (H) 136 - 145 mmol/L    Potassium 4.1 3.5 - 5.1 mmol/L    Chloride 122 (H) 95 - 110 mmol/L    CO2 16 (L) 23 - 29 mmol/L    Glucose 91 70 - 110 mg/dL    BUN 40 (H) 8 - 23 mg/dL    Creatinine 1.6 (H) 0.5 - 1.4 mg/dL    Calcium 10.0 8.7 - 10.5 mg/dL    Anion Gap 10 8 - 16 mmol/L    eGFR 44 (A) >60 mL/min/1.73 m^2   Magnesium    Collection Time: 10/13/22  2:50 AM   Result Value Ref Range    Magnesium 2.2 1.6 - 2.6 mg/dL   Comprehensive Metabolic Panel    Collection Time: 10/13/22  2:50 AM   Result Value Ref Range    Sodium 148 (H) 136 - 145 mmol/L    Potassium 4.1 3.5 - 5.1 mmol/L    Chloride 122 (H) 95 - 110 mmol/L    CO2 16 (L) 23 - 29 mmol/L    Glucose 91 70 - 110 mg/dL    BUN 40 (H) 8 - 23 mg/dL    Creatinine 1.6 (H) 0.5 - 1.4 mg/dL    Calcium 10.0 8.7 - 10.5 mg/dL    Total Protein 6.1 6.0 - 8.4 g/dL    Albumin 3.1 (L) 3.5 - 5.2 g/dL    Total Bilirubin 0.6 0.1 - 1.0 mg/dL    Alkaline Phosphatase 102 55 - 135 U/L    AST 25 10 - 40 U/L    ALT 22 10 - 44 U/L    Anion Gap 10 8 - 16 mmol/L    eGFR 44 (A)  >60 mL/min/1.73 m^2   Vitamin D    Collection Time: 10/13/22 12:40 PM   Result Value Ref Range    Vit D, 25-Hydroxy 14 (L) 30 - 96 ng/mL   Sodium    Collection Time: 10/13/22 12:51 PM   Result Value Ref Range    Sodium 148 (H) 136 - 145 mmol/L   APTT    Collection Time: 10/13/22  1:36 PM   Result Value Ref Range    aPTT 31.3 21.0 - 32.0 sec   Protime-INR    Collection Time: 10/13/22  1:36 PM   Result Value Ref Range    Prothrombin Time 12.3 9.0 - 12.5 sec    INR 1.2 0.8 - 1.2   Sodium    Collection Time: 10/13/22  5:24 PM   Result Value Ref Range    Sodium 145 136 - 145 mmol/L   APTT    Collection Time: 10/13/22  9:35 PM   Result Value Ref Range    aPTT 107.6 (H) 21.0 - 32.0 sec   APTT    Collection Time: 10/13/22 11:53 PM   Result Value Ref Range    aPTT 85.2 (H) 21.0 - 32.0 sec   CBC auto differential    Collection Time: 10/14/22  3:20 AM   Result Value Ref Range    WBC 13.53 (H) 3.90 - 12.70 K/uL    RBC 3.27 (L) 4.60 - 6.20 M/uL    Hemoglobin 10.5 (L) 14.0 - 18.0 g/dL    Hematocrit 34.6 (L) 40.0 - 54.0 %     (H) 82 - 98 fL    MCH 32.1 (H) 27.0 - 31.0 pg    MCHC 30.3 (L) 32.0 - 36.0 g/dL    RDW 15.5 (H) 11.5 - 14.5 %    Platelets 356 150 - 450 K/uL    MPV 9.6 9.2 - 12.9 fL    Immature Granulocytes 0.5 0.0 - 0.5 %    Gran # (ANC) 10.1 (H) 1.8 - 7.7 K/uL    Immature Grans (Abs) 0.07 (H) 0.00 - 0.04 K/uL    Lymph # 2.2 1.0 - 4.8 K/uL    Mono # 1.0 0.3 - 1.0 K/uL    Eos # 0.1 0.0 - 0.5 K/uL    Baso # 0.03 0.00 - 0.20 K/uL    nRBC 0 0 /100 WBC    Gran % 75.0 (H) 38.0 - 73.0 %    Lymph % 16.4 (L) 18.0 - 48.0 %    Mono % 7.2 4.0 - 15.0 %    Eosinophil % 0.7 0.0 - 8.0 %    Basophil % 0.2 0.0 - 1.9 %    Differential Method Automated    Comprehensive Metabolic Panel    Collection Time: 10/14/22  3:21 AM   Result Value Ref Range    Sodium 147 (H) 136 - 145 mmol/L    Potassium 4.5 3.5 - 5.1 mmol/L    Chloride 120 (H) 95 - 110 mmol/L    CO2 19 (L) 23 - 29 mmol/L    Glucose 173 (H) 70 - 110 mg/dL    BUN 28 (H) 8 - 23  mg/dL    Creatinine 1.4 0.5 - 1.4 mg/dL    Calcium 10.1 8.7 - 10.5 mg/dL    Total Protein 5.3 (L) 6.0 - 8.4 g/dL    Albumin 2.8 (L) 3.5 - 5.2 g/dL    Total Bilirubin 0.5 0.1 - 1.0 mg/dL    Alkaline Phosphatase 94 55 - 135 U/L    AST 17 10 - 40 U/L    ALT 21 10 - 44 U/L    Anion Gap 8 8 - 16 mmol/L    eGFR 51 (A) >60 mL/min/1.73 m^2   Magnesium    Collection Time: 10/14/22  3:21 AM   Result Value Ref Range    Magnesium 1.9 1.6 - 2.6 mg/dL   APTT    Collection Time: 10/14/22  7:49 AM   Result Value Ref Range    aPTT 56.6 (H) 21.0 - 32.0 sec      No results found for: PHENYTOIN, PHENOBARB, VALPROATE, CBMZ      EXAMINATION    VITALS   Vitals:    10/14/22 0006 10/14/22 0400 10/14/22 0500 10/14/22 0708   BP:       BP Location:       Patient Position:       Pulse: 80 84     Resp:       Temp:       TempSrc:       SpO2:    96%   Weight:   62.7 kg (138 lb 3.7 oz)    Height:          CONSTITUTIONAL  General Appearance: NAD, unremarkable, age appropriate, disheveled, lying in bed, calm, more awake, confused, normal weight      MUSCULOSKELETAL  Muscle Strength and Tone: attempted but unable to assess due to patient's AMS / Delirium / Dementia  Abnormal Involuntary Movements: none observed ; no tremors observed   Gait and Station: attempted but unable to assess due to patient's AMS / Delirium / Dementia     PSYCHIATRIC   Behavior/Cooperation:  reluctant to participate, uncooperative, psychomotor retardation, eye contact minimal  Speech:  slowed, dysarthia, increased latency of response, soft, nonsensical   Language: grossly intact with spontaneous speech  Mood: attempted but unable to assess due to patient's AMS / Delirium / Dementia  Affect:  Constricted   Associations: +ASHWIN  Thought Process: Confused / Disorganized / ASHWIN   Thought Content: attempted but unable to assess due to patient's AMS / Delirium / Dementia  Sensorium:  Awake/Delirium  Alert and Oriented: to self only.  He was disoriented to name of place, type of place,  city, state, month, year, and situation.  Memory: attempted but unable to assess due to patient's AMS / Delirium / Dementia  Attention/concentration: Impaired / Limited   Similarities: Impaired / Limited   Abstract reasoning: Impaired / Limited  Fund of Knowledge: attempted but unable to assess due to patient's AMS / Delirium / Dementia  Insight: Impaired / Limited  Judgment: Impaired / Limited      CAM ICU Delirium Assessment - POSITIVE      Is the patient aware of the biomedical complications associated with substance abuse and mental illness?   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     - Continue to HOLD prior Lithium and Haldol at this time (patient with ALICIA on admission ; improving on CMP this morning) (attempted to discuss risks/benefits/alt vs no treatment with patient).     - Would have liked to begin Depacon 250 mg IV TID for mood / behavioral disturbances in delirium / dementia & Bipolar dx, but pharmacy is out of this medication at this time (nationwide supply chain issue) (no reasonable alternative at this time while patient can't take medications orally) (attempted to discuss risks/benefits/alt vs no treatment with patient).     Implement / Continue the below DELIRIUM BEHAVIOR MANAGEMENT:  - Minimize use of restraints; if physical restraints necessary, please utilize medical/chemical prns for agitation (can use Zyprexa 2.5 mg to 5 mg PO/IM q8 hours PRN).  - Keep shades open and room  "lit during day and room dim at night in order to promote healthy circadian rhythms.  - Encourage family at bedside.  - Keep whiteboard in patient's room current with the date and name of the members of patient's team for easy patient self re-orientation.  - Avoid benzodiazepines, antihistamines, anticholinergics, hypnotics, and minimize opiates while controlling for pain as these medications may exacerbate delirium.      - Psychotherapy was again performed with patient as noted above with a focus on improving orientation, confusion, mood / irritability, and behavior.     - Patient's most recent resulted labs, imaging, and EKG were reviewed again today ; CT Head from 10/11/2022 with "global prominence of the ventricles, cisterns and sulci as seen with senescent atrophic changes similar to prior exam.  Moderate confluent low density in the periventricular white matter is again noted as commonly seen with chronic microvascular ischemic changes.  Remote lacunar infarcts are again noted bilaterally in the basal ganglia.  No appreciable mass or mass effect is seen.  Remote left inferior cerebellar infarct again noted." Ammonia level was wnl at 26 from 10/12/22 and Lithium level was 1.1 from 10/12/22.  Awaiting recent Folate, B12, and Niacin levels.     - Continue to monitor EKG to ensure that QTc remains below 500, especially if patient is requiring any PRN neuroleptic medications.      - Provide Folate / Thiamine / Multi-Vitamin supplementation.     - Neurology Team has been consulted by Primary Team for further assessment of encephalopathy (such as need for LP, EEG, etc).     - Thank you for this consult ; will continue to follow patient         Total time spent with patient and/or managing/coordinating patient's care today (excluding the time spent on psychotherapy): 40 minutes   Time spent on psychotherapy today (as noted above): 17 minutes   Total time for encounter today including psychotherapy: 57 minutes      More " than 50% of the time was spent counseling/coordinating care.     Consulting clinician was informed of the encounter and consult note.      STAFF:  Frederick Ramos MD  [facility]  10/14/2022

## 2022-10-14 NOTE — CONSULTS
[facility] NEUROLOGY CONSULT  EVALUATION    Reason for consult:  Encephalopathy  Informant:  Primary team    Other sources of information : chart review , patient , primary team    CC:  Altered Mental Status (Pt sent from [facility] for eval of AMS. Per Chani at [facility], pt sent for AMS, states that pt has been somnolent on today and had blood work that showed an ALICIA. Chani states that the doctor also noted abnormal lung sounds. Per Chani, pt baseline mentation is oriented to self and sometimes situation only. Pt currently a PEC for SI.  )       HPI: Cheikh Yanez is a 81 y.o. male with  history of heart failure , Afib on Eliquis , HTN , h/o PE , h/o stroke and Bipolar who presents from [facility]  for altered mental status.   Patient unable to provide history. He was only able to mention his name and follow commands , with intermittent unprovoked yelling and drowsiness.   Upon chart review , patient has been having multiple admissions with symptoms including falls, worsening dysarthria and subsequently worsening dysphagia within the last 2 years.     ROS:   12pt ROS negative other then mentioned above    Histories:     Allergies:  Patient has no known allergies.    Current Medications:    Current Facility-Administered Medications   Medication Dose Route Frequency Provider Last Rate Last Admin    albuterol-ipratropium 2.5 mg-0.5 mg/3 mL nebulizer solution 3 mL  3 mL Nebulization Q4H PRN Caitlin Kenyon NP        azithromycin 500 mg in dextrose 5 % 250 mL IVPB (ready to mix system)  500 mg Intravenous Q24H Miryam Cadet PA-C   Stopped at 10/13/22 1227    cefTRIAXone (ROCEPHIN) 1 g/50 mL D5W IVPB  1 g Intravenous Q24H Caitlin Kenyon NP   Stopped at 10/14/22 0049    dextrose 5 % infusion   Intravenous Continuous Miryam Cadet PA-C 125 mL/hr at 10/14/22 0530 New Bag at 10/14/22 0530    heparin 25,000 units in dextrose 5% (100 units/ml) IV bolus from bag - ADDITIONAL PRN BOLUS - 30 units/kg  30  Units/kg (Adjusted) Intravenous PRN Miryam Cadet PA-C        heparin 25,000 units in dextrose 5% (100 units/ml) IV bolus from bag - ADDITIONAL PRN BOLUS - 60 units/kg  60 Units/kg (Adjusted) Intravenous PRN Miryam Cadet PA-C        heparin 25,000 units in dextrose 5% 250 mL (100 units/mL) infusion LOW INTENSITY nomogram - [facility]  0-40 Units/kg/hr (Adjusted) Intravenous Continuous VERONICA Morris-C 5.7 mL/hr at 10/14/22 0207 9 Units/kg/hr at 10/14/22 0207    OLANZapine injection 2.5 mg  2.5 mg Intramuscular Q8H PRN Jasbir Liu NP             Past Medical/Surgical/Family/Social History:  PMHx: No past medical history on file.   Surgeries: No past surgical history on file.   Family  Hx: No family history on file.   Social Hx:        Current Evaluation:     Vital Signs:   Vitals:    10/14/22 1212   BP:    Pulse: 92   Resp:    Temp:         General Exam  Drowsy  Orientation  Drowsy oriented to self only   Memory  Recent and remote memory Impaired   Language  Dysarthria, No aphasia.   Cranial Nerves  PERRL, Unable to examine VF, EOMI, V1-V3 intact, symmetric facial expression, hearing grossly intact,  tongue midline  Motor  Decreased Bulk, Normal Tone  Right Upper Extremity: Normal 3/5 strength  Left Upper Extremity: Normal 3/5 strength  Right Lower Extremity: Normal 3/5 strength  Left Lower Extremity: Normal 3/5 strength  Sensory  Normal to light touch  and pin prick throughout  Deferred Vibration / proprioception  Deferred Romberg   DTR  Upper Extremities:  +2/4, symmetric  Lower Extremities: Patellar and Achilles +1/4 and symmetric  Cerebellar/Gait  Normal finger to nose   Deferred heel to shin.   Deferred gait and stance.       LABORATORY STUDIES:  Labs:  Recent Labs   Lab 10/11/22  2123 10/13/22  0250 10/14/22  0320   WBC 16.91* 15.29* 13.53*   HGB 12.1* 11.5* 10.5*   HCT 39.6* 37.6* 34.6*    381 356   * 105* 106*       Recent Labs   Lab 10/11/22  2123 10/13/22  0250 10/13/22  1251  10/13/22  1724 10/14/22  0321    148*  148* 148* 145 147*   K 5.2* 4.1  4.1  --   --  4.5   * 122*  122*  --   --  120*   CO2 17* 16*  16*  --   --  19*   BUN 53* 40*  40*  --   --  28*    91  91  --   --  173*   CALCIUM 10.9* 10.0  10.0  --   --  10.1   PROT 6.9 6.1  --   --  5.3*   ALBUMIN 3.6 3.1*  --   --  2.8*   BILITOT 0.7 0.6  --   --  0.5   AST 29 25  --   --  17   ALKPHOS 111 102  --   --  94   ALT 23 22  --   --  21       Recent Labs   Lab 10/13/22  1336   INR 1.2       Recent Labs   Lab 10/11/22  2123 10/13/22  0250 10/14/22  0321    91  91 173*       Urine:   Lab Results   Component Value Date    LABURIN Further report to follow 10/11/2022    SPECGRAV 1.010 10/11/2022    NITRITE Negative 10/11/2022    KETONESU Trace (A) 10/11/2022    UROBILINOGEN Negative 10/11/2022    WBCUA >100 (H) 10/11/2022       No results for input(s): HGBA1C, GLUF, MICROALBUR, LDLCALC in the last 168 hours.    Thyroid:   Recent Labs   Lab 10/11/22  2123   TSH 1.414       FLP:   No results for input(s): CHOL, HDL, LDLCALC, TRIG, CHOLHDL in the last 168 hours.    Cardiac markers:  Recent Labs   Lab 10/11/22  2123 10/12/22  0411 10/12/22  0835   TROPONINI 0.120* 0.086* 0.101*       RADIOLOGY STUDIES:  Imaging Results              CT Head Without Contrast (Final result)  Result time 10/11/22 22:22:22      Final result by Bel Noguera MD (10/11/22 22:22:22)                   Impression:      No convincing acute intracranial hemorrhage, finding of major arterial acute infarct or mass effect.    Sinus disease in the left maxillary and left posterior ethmoid sinuses.    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    Nonspecific stable deep white matter low density as seen with microvascular chronic ischemic changes.    Please see above for additional incidental nonacute findings.      Electronically signed by: Bel Noguera  Date:    10/11/2022  Time:    22:22                Narrative:    EXAMINATION:  CT HEAD WITHOUT CONTRAST    CLINICAL HISTORY:  Mental status change, unknown cause;    TECHNIQUE:  Low dose axial images were obtained through the head.  Coronal and sagittal reformations were also performed. Contrast was not administered.    COMPARISON:  04/08/2015 CT brain.    FINDINGS:  There is no evidence of acute intracranial intra or extra-axial hemorrhage or hematoma.  The gray-white matter junction differentiation appears to be intact.  There is no focal mass or mass effect.  There is global prominence of the ventricles, cisterns and sulci as seen with senescent atrophic changes similar to prior exam.  Moderate confluent low density in the periventricular white matter is again noted as commonly seen with chronic microvascular ischemic changes.    Remote lacunar infarcts are again noted bilaterally in the basal ganglia.  No appreciable mass or mass effect is seen.  Remote left inferior cerebellar infarct again noted.    There is no evidence of hydrocephalus.    Prominent atherosclerotic calcifications are noted in the distal vertebral and basilar arteries and intracranial internal carotid arteries bilaterally.    There is complete opacification of the left maxillary sinus and posterior left ethmoid air cells compatible with sinus disease.  Remaining sinuses are clear as are the mastoid air cells and middle ears bilaterally.  Orbital structures grossly appear intact as imaged.  There is motion artifact limiting evaluation through the skull base.  Grossly the bony calvarium is intact with no evidence of fracture.                                       X-Ray Chest AP Portable (Final result)  Result time 10/11/22 21:50:03      Final result by King Miranda MD (10/11/22 21:50:03)                   Impression:      Right basilar airspace opacity.      Electronically signed by: King Miranda MD  Date:    10/11/2022  Time:    21:50               Narrative:    EXAMINATION:  XR  "CHEST AP PORTABLE    CLINICAL HISTORY:  ams;    TECHNIQUE:  Single frontal view of the chest was performed.    COMPARISON:  04/08/2015.    FINDINGS:  There is a left-sided AICD with its tip overlying the left ventricle.  Monitoring EKG leads are present.    The trachea is unremarkable.  There are calcifications of the aortic knob.  The cardiomediastinal silhouette is within normal limits.  The hemidiaphragms are unremarkable.  There are no pleural effusions.  There is no evidence of a pneumothorax.  There is no evidence of pneumomediastinum.  There is a right basilar airspace opacity.  There are degenerative changes in the osseous structures.                                      OTHER STUDIES/PROCEDURES:   MRI Brain 09/2022    Midbrain atrophy prominent with " Humming Bird " sign.       Axial T2 with midbrain atrophy .         Assessment/Plan:  P     81 y.o. male with  history of heart failure , Afib on Eliquis , HTN , h/o PE , h/o stroke and Bipolar who presents from [facility]  for Encephalopathy. Clinical presentation significant for  worsening dysarthria, dysphagia , frontal cognitive abnormalities and increased falls.  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 Encephalopathy secondary to infectious/ metabolic deficits.     - Recommend correction of infectious/ metabolic derangements   - Would benefit form Speech and language therapy  to help manage dysphagia and dysarthrra  - Recommend Occupational therapy to eval and treat in performing ADL's  - Recommend Physical therapy to provide symptomatic treatment for postural instability and falls       No further intervention indicated at this time from Neurology Service. Please call if any further questions or any changes in neurologic condition.      Case discussed with Dr. Soto    Thank you for your consult.    Electronically signed by:   Kiran Ceballos MD   [facility] " Neurology  10/14/2022 1:03 PM

## 2022-10-14 NOTE — ASSESSMENT & PLAN NOTE
Contributing Nutrition Diagnosis  Inadequate energy intake    Related to (etiology):   AMS    Signs and Symptoms (as evidenced by):   NPO per SLP recs    Interventions:  Collaboration with other providers    Nutrition Diagnosis Status:   Continues

## 2022-10-14 NOTE — PLAN OF CARE
10/14/22 1204   Post-Acute Status   Post-Acute Authorization Placement   Post-Acute Placement Status Pending state direction/certification  ([employer] called. [employer] faxed. Requesting hospital exemption for placement.)

## 2022-10-14 NOTE — PROGRESS NOTES
[facility] Medicine  Progress Note    Patient Name: Cheikh Yanez  MRN: [id number]  Patient Class: IP- Inpatient   Admission Date: 10/11/2022  Length of Stay: 2 days  Attending Physician: Matthew Amaro, *  Primary Care Provider: Primary Doctor No        Subjective:     Principal Problem:Encephalopathy due to infection          HPI:  81-year-old male with past medical history of HFrecEF 50%, Afib on eliquis, HTN, Hx of PE, Hx CVA, and Bipolar 1 who presented to the emergency department from [facility] with altered mental status. Patient unable to provide any history. Per RN, patient normally oriented to self and now is somnolent.  ED findings:  WBCs 16.91, potassium 5.2, creatinine 2.2, troponin 0.120, EKG without ischemic changes, u/a concerning for infectious process,  chest x-ray showed a right basilar airspace opacity, covid pending, blood cultures pending.  Patient admitted to hospital medicine observation unit for further medical management.     Facility Medlist:  atorvastatin 80 mg qhs, finasteride 5 mg qhs, lisinopril 40 mg daily, melatonin 15 mg qhs, metoprolol ER 25 mg daily, MVI daily, spironolactone 25 mg qhs, thiamine 100 mg daily, duloxetine 40 mg daily, eliquis 5 mg bid daily, trazodone 25 mg daily, as needed mag hydroxide, as need maalox, as need tylenol 325 mg q 4 prn, as needed nitro      Overview/Hospital Course:  Pt admitted for AMS, pt sent from [facility].  PEC in place.  UA notable for infection, pt started on ceftriaxone.  Psychiatry consulted, PEC discontinued.  It was recommended to hold lithium and haldol with ALICIA.  Depacon recommended however nationwide supply chain issue and no reasonable alternative.  Zyprexa prn agitation.  Folate/Thiamine/MVI.  RD consult as pt failed swallow assessment.  Neurology consult for encephalopathy.  10/13 UC with multiple organisms none in predominance, micro lab will review plate again 10/13.  10/14  UC with gram negative rods and proteus species.  Neurology consulted and suspect metabolic encephalopathy.  Nephrology agrees with D5 for treatment of hypernatremia.      Interval History:  no acute events overnight, mumbling his name.    Review of Systems   Unable to perform ROS: Mental status change   Objective:     Vital Signs (Most Recent):  Temp: 98.2 °F (36.8 °C) (10/14/22 1123)  Pulse: 92 (10/14/22 1212)  Resp: 18 (10/14/22 1123)  BP: (!) 142/68 (10/14/22 1123)  SpO2: 97 % (10/14/22 1123)   Vital Signs (24h Range):  Temp:  [96.7 °F (35.9 °C)-98.4 °F (36.9 °C)] 98.2 °F (36.8 °C)  Pulse:  [69-92] 92  Resp:  [18] 18  SpO2:  [95 %-100 %] 97 %  BP: (129-156)/(68-85) 142/68     Weight: 62.7 kg (138 lb 3.7 oz)  Body mass index is 21.65 kg/m².    Intake/Output Summary (Last 24 hours) at 10/14/2022 1454  Last data filed at 10/14/2022 1320  Gross per 24 hour   Intake 2337.43 ml   Output 1750 ml   Net 587.43 ml      Physical Exam  HENT:      Head: Normocephalic and atraumatic.   Cardiovascular:      Rate and Rhythm: Normal rate.      Pulses: Normal pulses.   Pulmonary:      Effort: No respiratory distress.      Breath sounds: No wheezing.   Abdominal:      General: Bowel sounds are normal.      Palpations: Abdomen is soft.   Musculoskeletal:         General: No deformity.      Cervical back: Neck supple.   Skin:     General: Skin is warm and dry.   Neurological:      Mental Status: He is alert. He is disoriented.      GCS: GCS eye subscore is 4. GCS verbal subscore is 3. GCS motor subscore is 5.       Significant Labs: All pertinent labs within the past 24 hours have been reviewed.    Significant Imaging: I have reviewed all pertinent imaging results/findings within the past 24 hours.      Assessment/Plan:      * Encephalopathy due to infection  HCAP (healthcare-associated pneumonia)  UTI (urinary tract infection)  Leukocytosis    Continuous Cardiac monitoring  Trend troponin, trended down  Oxygen prn  duonebs prn  IVFs    Pt was started on macrobid outpatient on 10/11.  Continue IV ceftriaxone and azithro  -UC multiple organisms in predominance, called micro lab and spoke with Yo Huff, will look at plate again.  Update 10/14 gram negative rods and proteus, continue ceftriaxone and azithro.  -US retroperitoneal: Probable blood products in the urinary bladder.  A mass is not entirely excluded, follow-up is recommended. No hydronephrosis.   BC NGTD  Psychiatry following and recommended Neurology consult, will place.  neurochecks   -failed swallow study, nutrition consult for possible TF    Hypernatremia  Suspect hypovolemic hypernatremia however pt does have ALICIA and history of lithium use.  Will consult nephrology, possible diabetes insipidus  Lithium level 1.5 on 10/6/22, elevated  -Nephrology recs appreciated, agree with D5.  No NS.    Congestive heart failure  Appears euvolemic  Continuous cardiac monitoring  Trend troponins, trended down  Facility medlist: atorvastatin 80 mg qhs, lisinopril 40 mg daily, metoprolol succinated ER 25 mg daily, spironolactone 25 mg daily    Results for orders placed during the hospital encounter of 10/11/22    Echo    Interpretation Summary  · The left ventricle is normal in size with concentric remodeling and normal systolic function.  · The estimated ejection fraction is 55%.  · A diastolic pattern consistent with atrial fibrillation observed.  · Mild-to-moderate mitral regurgitation.  · There is mild aortic valve stenosis.  · Aortic valve area is 1.36 cm2; peak velocity is 2.16 m/s; mean gradient is 11 mmHg.  · Elevated central venous pressure (15 mmHg).  · The estimated PA systolic pressure is 45 mmHg.  · Mild tricuspid regurgitation.  · Normal right ventricular size with normal right ventricular systolic function.  · There is pulmonary hypertension.      CAD (coronary artery disease)  See above, resume statin once passes swallow assessment      History of CVA (cerebrovascular  accident)  Facility medlist: atorvastatin 80 mg qhs, finasteride 5 mg qhs, lisinopril 40 mg daily, melatonin 15 mg qhs, metoprolol ER 25 mg daily, MVI daily, spironolactone 25 mg qhs, thiamine 100 mg daily, duloxetine 40 mg daily, eliquis 5 mg bid daily, trazodone 25 mg daily, as needed mag hydroxide, as need maalox, as need tylenol 325 mg q 4 prn, as needed nitro  -failed swallow study, oral meds on hold  -heparin drip initiated      A-fib  Heart rate controlled  Continuous cardiac monitoring  [id number]  Pt was on eliquis 5 mg bid, failed swallow study.  Start low intensity heparin drip.    Results for orders placed during the hospital encounter of 10/11/22    Echo    Interpretation Summary  · The left ventricle is normal in size with concentric remodeling and normal systolic function.  · The estimated ejection fraction is 55%.  · A diastolic pattern consistent with atrial fibrillation observed.  · Mild-to-moderate mitral regurgitation.  · There is mild aortic valve stenosis.  · Aortic valve area is 1.36 cm2; peak velocity is 2.16 m/s; mean gradient is 11 mmHg.  · Elevated central venous pressure (15 mmHg).  · The estimated PA systolic pressure is 45 mmHg.  · Mild tricuspid regurgitation.  · Normal right ventricular size with normal right ventricular systolic function.  · There is pulmonary hypertension.          History of pulmonary embolism  No signs of respiratory distress  Monitor  Start low intensity heparin as failed swallow assessment, eliquis on hold      Hypertension  Stable, see medlist above      Suicidal ideation  Bipolar disorder    Transferred from [facility] due to medical issues  Stable  PEC in place  Consult Psychiatry for continued management   Psych recommended d/c PEC      Elevated troponin  Continuous cardiac monitoring  ekg without ischemic changes   Troponin trended down        VTE Risk Mitigation (From admission, onward)         Ordered     heparin 25,000 units in dextrose  5% (100 units/ml) IV bolus from bag - ADDITIONAL PRN BOLUS - 60 units/kg  As needed (PRN)        Question:  Heparin Infusion Adjustment (DO NOT MODIFY ANSWER)  Answer:  \\[email or web address]\epic\Images\Pharmacy\HeparinInfusions\heparin LOW INTENSITY nomogram for [facility] [id number]    10/13/22 1326     heparin 25,000 units in dextrose 5% (100 units/ml) IV bolus from bag - ADDITIONAL PRN BOLUS - 30 units/kg  As needed (PRN)        Question:  Heparin Infusion Adjustment (DO NOT MODIFY ANSWER)  Answer:  \\[email or web address]\epic\Images\Pharmacy\HeparinInfusions\heparin LOW INTENSITY nomogram for [facility] [id number]    10/13/22 1326     heparin 25,000 units in dextrose 5% 250 mL (100 units/mL) infusion LOW INTENSITY nomogram - [facility]  Continuous        Question Answer Comment   Heparin Infusion Adjustment (DO NOT MODIFY ANSWER) \\[email or web address]\epic\Images\Pharmacy\HeparinInfusions\heparin LOW INTENSITY nomogram for [facility] [id number]    Begin at (in units/kg/hr) 12        10/13/22 1326                Discharge Planning   JAYSON: 10/17/2022     Code Status: Full Code   Is the patient medically ready for discharge?:     Reason for patient still in hospital (select all that apply): Patient trending condition and Consult recommendations  Discharge Plan A: [facility]                  Miryam Cadet PA-C  Department of [facility] Medicine   [facility]

## 2022-10-15 PROBLEM — R31.9 HEMATURIA: Status: ACTIVE | Noted: 2022-10-15

## 2022-10-15 LAB
ALBUMIN SERPL BCP-MCNC: 2.7 G/DL (ref 3.5–5.2)
ANION GAP SERPL CALC-SCNC: 8 MMOL/L (ref 8–16)
APTT BLDCRRT: 25.1 SEC (ref 21–32)
APTT BLDCRRT: 48.4 SEC (ref 21–32)
APTT BLDCRRT: 57.7 SEC (ref 21–32)
APTT BLDCRRT: 70.8 SEC (ref 21–32)
BACTERIA SPEC AEROBE CULT: NORMAL
BACTERIA UR CULT: ABNORMAL
BASOPHILS # BLD AUTO: 0.04 K/UL (ref 0–0.2)
BASOPHILS NFR BLD: 0.2 % (ref 0–1.9)
BUN SERPL-MCNC: 16 MG/DL (ref 8–23)
CALCIUM SERPL-MCNC: 9.4 MG/DL (ref 8.7–10.5)
CHLORIDE SERPL-SCNC: 113 MMOL/L (ref 95–110)
CO2 SERPL-SCNC: 18 MMOL/L (ref 23–29)
CREAT SERPL-MCNC: 1 MG/DL (ref 0.5–1.4)
DIFFERENTIAL METHOD: ABNORMAL
EOSINOPHIL # BLD AUTO: 0.2 K/UL (ref 0–0.5)
EOSINOPHIL NFR BLD: 1 % (ref 0–8)
ERYTHROCYTE [DISTWIDTH] IN BLOOD BY AUTOMATED COUNT: 14.8 % (ref 11.5–14.5)
EST. GFR  (NO RACE VARIABLE): >60 ML/MIN/1.73 M^2
GLUCOSE SERPL-MCNC: 119 MG/DL (ref 70–110)
GRAM STN SPEC: NORMAL
GRAM STN SPEC: NORMAL
HCT VFR BLD AUTO: 33.9 % (ref 40–54)
HGB BLD-MCNC: 10.6 G/DL (ref 14–18)
IMM GRANULOCYTES # BLD AUTO: 0.09 K/UL (ref 0–0.04)
IMM GRANULOCYTES NFR BLD AUTO: 0.5 % (ref 0–0.5)
LYMPHOCYTES # BLD AUTO: 2.2 K/UL (ref 1–4.8)
LYMPHOCYTES NFR BLD: 13.1 % (ref 18–48)
MAGNESIUM SERPL-MCNC: 1.5 MG/DL (ref 1.6–2.6)
MCH RBC QN AUTO: 32.3 PG (ref 27–31)
MCHC RBC AUTO-ENTMCNC: 31.3 G/DL (ref 32–36)
MCV RBC AUTO: 103 FL (ref 82–98)
MONOCYTES # BLD AUTO: 1.2 K/UL (ref 0.3–1)
MONOCYTES NFR BLD: 7.1 % (ref 4–15)
NEUTROPHILS # BLD AUTO: 12.9 K/UL (ref 1.8–7.7)
NEUTROPHILS NFR BLD: 78.1 % (ref 38–73)
NRBC BLD-RTO: 0 /100 WBC
OSMOLALITY UR: 355 MOSM/KG (ref 50–1200)
PHOSPHATE SERPL-MCNC: 1.7 MG/DL (ref 2.7–4.5)
PLATELET # BLD AUTO: 322 K/UL (ref 150–450)
PMV BLD AUTO: 9.5 FL (ref 9.2–12.9)
POCT GLUCOSE: 108 MG/DL (ref 70–110)
POTASSIUM SERPL-SCNC: 3.2 MMOL/L (ref 3.5–5.1)
PROCALCITONIN SERPL IA-MCNC: 0.07 NG/ML
RBC # BLD AUTO: 3.28 M/UL (ref 4.6–6.2)
SODIUM SERPL-SCNC: 139 MMOL/L (ref 136–145)
WBC # BLD AUTO: 16.51 K/UL (ref 3.9–12.7)

## 2022-10-15 PROCEDURE — 36415 COLL VENOUS BLD VENIPUNCTURE: CPT | Performed by: HOSPITALIST

## 2022-10-15 PROCEDURE — 99900035 HC TECH TIME PER 15 MIN (STAT)

## 2022-10-15 PROCEDURE — 25000003 PHARM REV CODE 250: Performed by: PHYSICIAN ASSISTANT

## 2022-10-15 PROCEDURE — 84145 PROCALCITONIN (PCT): CPT | Performed by: PHYSICIAN ASSISTANT

## 2022-10-15 PROCEDURE — 11000001 HC ACUTE MED/SURG PRIVATE ROOM

## 2022-10-15 PROCEDURE — 93010 EKG 12-LEAD: ICD-10-PCS | Mod: ,,, | Performed by: INTERNAL MEDICINE

## 2022-10-15 PROCEDURE — 93005 ELECTROCARDIOGRAM TRACING: CPT

## 2022-10-15 PROCEDURE — 93010 ELECTROCARDIOGRAM REPORT: CPT | Mod: ,,, | Performed by: INTERNAL MEDICINE

## 2022-10-15 PROCEDURE — 63600175 PHARM REV CODE 636 W HCPCS: Performed by: PHYSICIAN ASSISTANT

## 2022-10-15 PROCEDURE — 85730 THROMBOPLASTIN TIME PARTIAL: CPT | Mod: 91 | Performed by: HOSPITALIST

## 2022-10-15 PROCEDURE — 83735 ASSAY OF MAGNESIUM: CPT | Performed by: PHYSICIAN ASSISTANT

## 2022-10-15 PROCEDURE — 80069 RENAL FUNCTION PANEL: CPT | Performed by: STUDENT IN AN ORGANIZED HEALTH CARE EDUCATION/TRAINING PROGRAM

## 2022-10-15 PROCEDURE — 85025 COMPLETE CBC W/AUTO DIFF WBC: CPT | Performed by: PHYSICIAN ASSISTANT

## 2022-10-15 PROCEDURE — 36415 COLL VENOUS BLD VENIPUNCTURE: CPT | Performed by: PHYSICIAN ASSISTANT

## 2022-10-15 PROCEDURE — 85730 THROMBOPLASTIN TIME PARTIAL: CPT | Performed by: HOSPITALIST

## 2022-10-15 PROCEDURE — 94761 N-INVAS EAR/PLS OXIMETRY MLT: CPT

## 2022-10-15 PROCEDURE — 36415 COLL VENOUS BLD VENIPUNCTURE: CPT | Performed by: INTERNAL MEDICINE

## 2022-10-15 PROCEDURE — 63600175 PHARM REV CODE 636 W HCPCS: Performed by: STUDENT IN AN ORGANIZED HEALTH CARE EDUCATION/TRAINING PROGRAM

## 2022-10-15 PROCEDURE — 85730 THROMBOPLASTIN TIME PARTIAL: CPT | Mod: 91 | Performed by: INTERNAL MEDICINE

## 2022-10-15 RX ORDER — POTASSIUM CHLORIDE 7.45 MG/ML
10 INJECTION INTRAVENOUS
Status: COMPLETED | OUTPATIENT
Start: 2022-10-15 | End: 2022-10-15

## 2022-10-15 RX ORDER — CEFEPIME HYDROCHLORIDE 1 G/50ML
1 INJECTION, SOLUTION INTRAVENOUS
Status: DISCONTINUED | OUTPATIENT
Start: 2022-10-15 | End: 2022-10-20 | Stop reason: HOSPADM

## 2022-10-15 RX ORDER — MAGNESIUM SULFATE HEPTAHYDRATE 40 MG/ML
2 INJECTION, SOLUTION INTRAVENOUS ONCE
Status: COMPLETED | OUTPATIENT
Start: 2022-10-15 | End: 2022-10-15

## 2022-10-15 RX ORDER — CEFEPIME HYDROCHLORIDE 1 G/50ML
1 INJECTION, SOLUTION INTRAVENOUS
Status: DISCONTINUED | OUTPATIENT
Start: 2022-10-15 | End: 2022-10-15

## 2022-10-15 RX ORDER — POTASSIUM CHLORIDE 7.45 MG/ML
10 INJECTION INTRAVENOUS ONCE
Status: COMPLETED | OUTPATIENT
Start: 2022-10-15 | End: 2022-10-15

## 2022-10-15 RX ADMIN — MAGNESIUM SULFATE 2 G: 2 INJECTION INTRAVENOUS at 11:10

## 2022-10-15 RX ADMIN — POTASSIUM CHLORIDE 10 MEQ: 7.46 INJECTION, SOLUTION INTRAVENOUS at 01:10

## 2022-10-15 RX ADMIN — CEFEPIME 1 G: 1 INJECTION, POWDER, FOR SOLUTION INTRAMUSCULAR; INTRAVENOUS at 02:10

## 2022-10-15 RX ADMIN — AZITHROMYCIN MONOHYDRATE 500 MG: 500 INJECTION, POWDER, LYOPHILIZED, FOR SOLUTION INTRAVENOUS at 11:10

## 2022-10-15 RX ADMIN — HEPARIN SODIUM 12 UNITS/KG/HR: 10000 INJECTION, SOLUTION INTRAVENOUS at 04:10

## 2022-10-15 RX ADMIN — POTASSIUM CHLORIDE 10 MEQ: 7.46 INJECTION, SOLUTION INTRAVENOUS at 03:10

## 2022-10-15 RX ADMIN — HEPARIN SODIUM 11 UNITS/KG/HR: 10000 INJECTION, SOLUTION INTRAVENOUS at 01:10

## 2022-10-15 RX ADMIN — POTASSIUM CHLORIDE 10 MEQ: 7.46 INJECTION, SOLUTION INTRAVENOUS at 02:10

## 2022-10-15 RX ADMIN — POTASSIUM CHLORIDE 10 MEQ: 7.46 INJECTION, SOLUTION INTRAVENOUS at 10:10

## 2022-10-15 RX ADMIN — DEXTROSE: 5 SOLUTION INTRAVENOUS at 01:10

## 2022-10-15 NOTE — SUBJECTIVE & OBJECTIVE
Interval History: no acute events overnight.  Not much improvement in mental status.    Review of Systems   Unable to perform ROS: Mental status change   Objective:     Vital Signs (Most Recent):  Temp: 97.6 °F (36.4 °C) (10/15/22 0828)  Pulse: 67 (10/15/22 0828)  Resp: 18 (10/15/22 0828)  BP: (!) 116/54 (10/15/22 0828)  SpO2: 100 % (10/15/22 0828)   Vital Signs (24h Range):  Temp:  [96.1 °F (35.6 °C)-98.6 °F (37 °C)] 97.6 °F (36.4 °C)  Pulse:  [62-92] 67  Resp:  [14-18] 18  SpO2:  [94 %-100 %] 100 %  BP: (114-142)/(54-89) 116/54     Weight: 66.2 kg (145 lb 15.1 oz)  Body mass index is 22.86 kg/m².    Intake/Output Summary (Last 24 hours) at 10/15/2022 1048  Last data filed at 10/15/2022 0526  Gross per 24 hour   Intake 1750 ml   Output 1650 ml   Net 100 ml      Physical Exam  HENT:      Head: Normocephalic and atraumatic.   Cardiovascular:      Rate and Rhythm: Normal rate.      Pulses: Normal pulses.   Pulmonary:      Effort: No respiratory distress.      Breath sounds: No wheezing.   Abdominal:      General: Bowel sounds are normal.      Palpations: Abdomen is soft.   Musculoskeletal:         General: No deformity.      Cervical back: Neck supple.   Skin:     General: Skin is warm and dry.   Neurological:      Mental Status: He is alert. He is disoriented.      GCS: GCS eye subscore is 4. GCS verbal subscore is 3. GCS motor subscore is 5.       Significant Labs: All pertinent labs within the past 24 hours have been reviewed.    Significant Imaging: I have reviewed all pertinent imaging results/findings within the past 24 hours.

## 2022-10-15 NOTE — PT/OT/SLP PROGRESS
Occupational Therapy  Visit Attempt     Patient Name:  Cheikh Yanez   MRN:  [id number]    Patient not seen today secondary to  (Pt not appropriate for participation; eyes closed, mouth open, not following commands and/or awakening with sternal rub for appropriate participation in therapy). Will follow-up as available.    10/15/2022

## 2022-10-15 NOTE — PROGRESS NOTES
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 Patient unable to provide any history. Per RN, patient normally oriented to self and now is somnolent.  ED findings:  WBCs 16.91, potassium 5.2, creatinine 2.2, troponin 0.120, EKG without ischemic changes, u/a concerning for infectious process,  chest x-ray showed a right basilar airspace opacity, covid pending, blood cultures pending.  Patient admitted to hospital medicine observation unit for further medical management.     Facility Medlist:  atorvastatin 80 mg qhs, finasteride 5 mg qhs, lisinopril 40 mg daily, melatonin 15 mg qhs, metoprolol ER 25 mg daily, MVI daily, spironolactone 25 mg qhs, thiamine 100 mg daily, duloxetine 40 mg daily, eliquis 5 mg bid daily, trazodone 25 mg daily, as needed mag hydroxide, as need maalox, as need tylenol 325 mg q 4 prn, as needed nitro      Overview/Hospital Course:  Pt admitted for AMS, pt sent from [facility].  PEC in place.  UA notable for infection, pt started on ceftriaxone.  Psychiatry consulted, PEC discontinued.  It was recommended to hold lithium and haldol with ALICIA.  Depacon recommended however nationwide supply chain issue and no reasonable alternative.  Zyprexa prn agitation.  Folate/Thiamine/MVI.  RD consult as pt failed swallow assessment.  Neurology consult for encephalopathy.  10/13 UC with multiple organisms none in predominance, micro lab will review plate again  10/13.  10/14 UC with gram negative rods and proteus species.  Neurology consulted and suspect metabolic encephalopathy.  Nephrology agrees with D5 for treatment of hypernatremia with noted improvement.      Interval History: no acute events overnight.  Not much improvement in mental status.    Review of Systems   Unable to perform ROS: Mental status change   Objective:     Vital Signs (Most Recent):  Temp: 97.6 °F (36.4 °C) (10/15/22 0828)  Pulse: 67 (10/15/22 0828)  Resp: 18 (10/15/22 0828)  BP: (!) 116/54 (10/15/22 0828)  SpO2: 100 % (10/15/22 0828)   Vital Signs (24h Range):  Temp:  [96.1 °F (35.6 °C)-98.6 °F (37 °C)] 97.6 °F (36.4 °C)  Pulse:  [62-92] 67  Resp:  [14-18] 18  SpO2:  [94 %-100 %] 100 %  BP: (114-142)/(54-89) 116/54     Weight: 66.2 kg (145 lb 15.1 oz)  Body mass index is 22.86 kg/m².    Intake/Output Summary (Last 24 hours) at 10/15/2022 1048  Last data filed at 10/15/2022 0526  Gross per 24 hour   Intake 1750 ml   Output 1650 ml   Net 100 ml      Physical Exam  HENT:      Head: Normocephalic and atraumatic.   Cardiovascular:      Rate and Rhythm: Normal rate.      Pulses: Normal pulses.   Pulmonary:      Effort: No respiratory distress.      Breath sounds: No wheezing.   Abdominal:      General: Bowel sounds are normal.      Palpations: Abdomen is soft.   Musculoskeletal:         General: No deformity.      Cervical back: Neck supple.   Skin:     General: Skin is warm and dry.   Neurological:      Mental Status: He is alert. He is disoriented.      GCS: GCS eye subscore is 4. GCS verbal subscore is 3. GCS motor subscore is 5.       Significant Labs: All pertinent labs within the past 24 hours have been reviewed.    Significant Imaging: I have reviewed all pertinent imaging results/findings within the past 24 hours.      Assessment/Plan:      * Encephalopathy due to infection  HCAP (healthcare-associated pneumonia)  UTI (urinary tract infection)  Leukocytosis    Continuous Cardiac  monitoring  Trend troponin, trended down  Oxygen prn  duonebs prn  IVFs   Pt was started on macrobid outpatient on 10/11.  Continue IV ceftriaxone and azithro  -UC multiple organisms in predominance, called micro lab and spoke with Yo Huff, will look at plate again.  Update 10/14 gram negative rods and proteus, continue ceftriaxone and azithro.  -US retroperitoneal: Probable blood products in the urinary bladder.  A mass is not entirely excluded, follow-up is recommended. No hydronephrosis.   BC NGTD  Psychiatry following and recommended Neurology consult, will place.  neurochecks   -failed swallow study, nutrition consult for possible TF  -10/15 WBC slight increase, pt afebrile.  Will check procal and UC to be updated this afternoon.  Consider broadening antibiotics.    Hypernatremia  Hypomagnesemia  Hypokalemia  Hypophosphatemia  Suspect hypovolemic hypernatremia however pt does have ALICIA and history of lithium use.  Will consult nephrology, possible diabetes insipidus  Lithium level 1.5 on 10/6/22, elevated  -Nephrology recs appreciated, agree with D5.  No NS.  Improved.  -Replacing electrolytes IV    Congestive heart failure  Appears euvolemic  Continuous cardiac monitoring  Trend troponins, trended down  Facility medlist: atorvastatin 80 mg qhs, lisinopril 40 mg daily, metoprolol succinated ER 25 mg daily, spironolactone 25 mg daily    Results for orders placed during the hospital encounter of 10/11/22    Echo    Interpretation Summary  · The left ventricle is normal in size with concentric remodeling and normal systolic function.  · The estimated ejection fraction is 55%.  · A diastolic pattern consistent with atrial fibrillation observed.  · Mild-to-moderate mitral regurgitation.  · There is mild aortic valve stenosis.  · Aortic valve area is 1.36 cm2; peak velocity is 2.16 m/s; mean gradient is 11 mmHg.  · Elevated central venous pressure (15 mmHg).  · The estimated PA systolic pressure is 45 mmHg.  ·  Mild tricuspid regurgitation.  · Normal right ventricular size with normal right ventricular systolic function.  · There is pulmonary hypertension.      CAD (coronary artery disease)  See above, resume statin once passes swallow assessment      History of CVA (cerebrovascular accident)  Facility medlist: atorvastatin 80 mg qhs, finasteride 5 mg qhs, lisinopril 40 mg daily, melatonin 15 mg qhs, metoprolol ER 25 mg daily, MVI daily, spironolactone 25 mg qhs, thiamine 100 mg daily, duloxetine 40 mg daily, eliquis 5 mg bid daily, trazodone 25 mg daily, as needed mag hydroxide, as need maalox, as need tylenol 325 mg q 4 prn, as needed nitro  -failed swallow study, oral meds on hold  -heparin drip initiated      A-fib  Heart rate controlled  Continuous cardiac monitoring  [id number] 5  Pt was on eliquis 5 mg bid, failed swallow study.  Start low intensity heparin drip.    Results for orders placed during the hospital encounter of 10/11/22    Echo    Interpretation Summary  · The left ventricle is normal in size with concentric remodeling and normal systolic function.  · The estimated ejection fraction is 55%.  · A diastolic pattern consistent with atrial fibrillation observed.  · Mild-to-moderate mitral regurgitation.  · There is mild aortic valve stenosis.  · Aortic valve area is 1.36 cm2; peak velocity is 2.16 m/s; mean gradient is 11 mmHg.  · Elevated central venous pressure (15 mmHg).  · The estimated PA systolic pressure is 45 mmHg.  · Mild tricuspid regurgitation.  · Normal right ventricular size with normal right ventricular systolic function.  · There is pulmonary hypertension.          History of pulmonary embolism  No signs of respiratory distress  Monitor  Start low intensity heparin as failed swallow assessment, eliquis on hold      Hypertension  Stable, see medlist above      Suicidal ideation  Bipolar disorder    Transferred from [facility] due to medical issues  Stable  PEC in place  Consult  Psychiatry for continued management   Psych recommended d/c PEC      Elevated troponin  Continuous cardiac monitoring  ekg without ischemic changes   Troponin trended down        VTE Risk Mitigation (From admission, onward)         Ordered     heparin 25,000 units in dextrose 5% (100 units/ml) IV bolus from bag - ADDITIONAL PRN BOLUS - 60 units/kg  As needed (PRN)        Question:  Heparin Infusion Adjustment (DO NOT MODIFY ANSWER)  Answer:  \\[email or web address]\epic\Images\Pharmacy\HeparinInfusions\heparin LOW INTENSITY nomogram for [facility] [id number].pdf    10/13/22 1326     heparin 25,000 units in dextrose 5% (100 units/ml) IV bolus from bag - ADDITIONAL PRN BOLUS - 30 units/kg  As needed (PRN)        Question:  Heparin Infusion Adjustment (DO NOT MODIFY ANSWER)  Answer:  \\[email or web address]\epic\Images\Pharmacy\HeparinInfusions\heparin LOW INTENSITY nomogram for [facility] [id number].pdf    10/13/22 1326     heparin 25,000 units in dextrose 5% 250 mL (100 units/mL) infusion LOW INTENSITY nomogram - [facility]  Continuous        Question Answer Comment   Heparin Infusion Adjustment (DO NOT MODIFY ANSWER) \\[email or web address]\epic\Images\Pharmacy\HeparinInfusions\heparin LOW INTENSITY nomogram for [facility] [id number].pdf    Begin at (in units/kg/hr) 12        10/13/22 1326                Discharge Planning   JAYSON: 10/17/2022     Code Status: Full Code   Is the patient medically ready for discharge?:     Reason for patient still in hospital (select all that apply): Patient trending condition, Consult recommendations and PT / OT recommendations  Discharge Plan A: [facility]                  Miryam Cadet PA-C  Department of [facility] Medicine   [facility]

## 2022-10-15 NOTE — ASSESSMENT & PLAN NOTE
Bipolar disorder    Transferred from [facility] due to medical issues  Stable  PEC in place  Consult Psychiatry for continued management   Psych recommended d/c PEC

## 2022-10-15 NOTE — ASSESSMENT & PLAN NOTE
Hypomagnesemia  Hypokalemia  Hypophosphatemia  Suspect hypovolemic hypernatremia however pt does have ALICIA and history of lithium use.  Will consult nephrology, possible diabetes insipidus  Lithium level 1.5 on 10/6/22, elevated  -Nephrology recs appreciated, agree with D5.  No NS.  Improved.  -Replacing electrolytes IV

## 2022-10-15 NOTE — ASSESSMENT & PLAN NOTE
HCAP (healthcare-associated pneumonia)  UTI (urinary tract infection)  Leukocytosis    Continuous Cardiac monitoring  Trend troponin, trended down  Oxygen prn  duonebs prn  IVFs   Pt was started on macrobid outpatient on 10/11.  Continue IV ceftriaxone and azithro  -UC multiple organisms in predominance, called micro lab and spoke with Yo Huff, will look at plate again.  Update 10/14 gram negative rods and proteus, continue ceftriaxone and azithro.  -US retroperitoneal: Probable blood products in the urinary bladder.  A mass is not entirely excluded, follow-up is recommended. No hydronephrosis.   BC NGTD  Psychiatry following and recommended Neurology consult, will place.  neurochecks   -failed swallow study, nutrition consult for possible TF  -10/15 WBC slight increase, pt afebrile.  Will check procal and UC to be updated this afternoon.  Consider broadening antibiotics.

## 2022-10-15 NOTE — PT/OT/SLP PROGRESS
Physical Therapy      Patient Name:  Cheikh Yanez   MRN:  [id number]    Patient not seen today secondary to somnolent, mumbling when attempting to wake patient up at 13:15 .

## 2022-10-15 NOTE — ASSESSMENT & PLAN NOTE
Heart rate controlled  Continuous cardiac monitoring  [id number] 5  Pt was on eliquis 5 mg bid, failed swallow study.  Start low intensity heparin drip.    Results for orders placed during the hospital encounter of 10/11/22    Echo    Interpretation Summary  · The left ventricle is normal in size with concentric remodeling and normal systolic function.  · The estimated ejection fraction is 55%.  · A diastolic pattern consistent with atrial fibrillation observed.  · Mild-to-moderate mitral regurgitation.  · There is mild aortic valve stenosis.  · Aortic valve area is 1.36 cm2; peak velocity is 2.16 m/s; mean gradient is 11 mmHg.  · Elevated central venous pressure (15 mmHg).  · The estimated PA systolic pressure is 45 mmHg.  · Mild tricuspid regurgitation.  · Normal right ventricular size with normal right ventricular systolic function.  · There is pulmonary hypertension.

## 2022-10-15 NOTE — PLAN OF CARE
Problem: Adult Inpatient Plan of Care  Goal: Plan of Care Review  Outcome: Ongoing, Progressing  Goal: Patient-Specific Goal (Individualized)  Outcome: Ongoing, Progressing  Goal: Absence of Hospital-Acquired Illness or Injury  Outcome: Ongoing, Progressing  Goal: Optimal Comfort and Wellbeing  Outcome: Ongoing, Progressing  Goal: Readiness for Transition of Care  Outcome: Ongoing, Progressing     Problem: Fluid Imbalance (Pneumonia)  Goal: Fluid Balance  Outcome: Ongoing, Progressing     Problem: Infection (Pneumonia)  Goal: Resolution of Infection Signs and Symptoms  Outcome: Ongoing, Progressing     Problem: Respiratory Compromise (Pneumonia)  Goal: Effective Oxygenation and Ventilation  Outcome: Ongoing, Progressing     Problem: Fall Injury Risk  Goal: Absence of Fall and Fall-Related Injury  Outcome: Ongoing, Progressing     Problem: Skin Injury Risk Increased  Goal: Skin Health and Integrity  Outcome: Ongoing, Progressing     Problem: Confusion Acute  Goal: Optimal Cognitive Function  Outcome: Ongoing, Progressing     Problem: UTI (Urinary Tract Infection)  Goal: Improved Infection Symptoms  Outcome: Ongoing, Progressing     Problem: Hypertension Comorbidity  Goal: Blood Pressure in Desired Range  Outcome: Ongoing, Progressing

## 2022-10-15 NOTE — NURSING
Care plan reviewed. Pt aaox1 to self only. Remains on heparin and adjustments made per monolog currently at 12units/kg/hr (7.5ml/hr) next Aptt draw is at 10pm. Continues to be NPO, pt coughing with congested sounds, suction at the bedside. IVF infusing (Dextrose 5%) at 75ml/hr. Pt received IV doses of potassium and IV dose of magnesium as ordered, tolerated well. IV antibiotics also administered as ordered. Pt had episodes of pvc's reported to VERONICA Cadet. Ordered EKG. Safety maintained.

## 2022-10-15 NOTE — PROGRESS NOTES
Nephrology Progress Note       Consult Requested By: Mike Woods MD  Reason for Consult: ALICIA Hypernatremia    SUBJECTIVE:          Review of Systems   Unable to perform ROS: Acuity of condition     No past medical history on file.  No past surgical history on file.  No family history on file.       Review of patient's allergies indicates:  No Known Allergies         OBJECTIVE:     Vital Signs (Most Recent)  Vitals:    10/15/22 0100 10/15/22 0446 10/15/22 0500 10/15/22 0828   BP:  (!) 142/66  (!) 116/54   BP Location:    Right arm   Patient Position:  Lying  Lying   Pulse: 71 62  67   Resp: 16 14  18   Temp:  96.1 °F (35.6 °C)  97.6 °F (36.4 °C)   TempSrc:  Oral  Axillary   SpO2: (!) 94% 96%  100%   Weight:   66.2 kg (145 lb 15.1 oz)    Height:                     Medications:   azithromycin  500 mg Intravenous Q24H    cefTRIAXone (ROCEPHIN) IVPB  1 g Intravenous Q24H    magnesium sulfate IVPB  2 g Intravenous Once           Physical Exam  Vitals and nursing note reviewed.   Constitutional:       General: He is not in acute distress.     Appearance: He is ill-appearing. He is not diaphoretic.   HENT:      Head: Normocephalic and atraumatic.      Mouth/Throat:      Pharynx: No oropharyngeal exudate.   Eyes:      General: No scleral icterus.     Conjunctiva/sclera: Conjunctivae normal.      Pupils: Pupils are equal, round, and reactive to light.   Cardiovascular:      Rate and Rhythm: Normal rate and regular rhythm.      Heart sounds: Normal heart sounds. No murmur heard.  Pulmonary:      Effort: No respiratory distress.      Breath sounds: Rhonchi present.   Abdominal:      General: Bowel sounds are normal. There is no distension.      Palpations: Abdomen is soft.      Tenderness: There is no abdominal tenderness.   Musculoskeletal:         General: Normal range of motion.      Cervical back: Normal range of motion and neck supple.   Skin:     General: Skin is warm and dry.      Findings: No erythema.    Neurological:      Mental Status: He is alert. He is disoriented and confused.      Cranial Nerves: No cranial nerve deficit.       Laboratory:  Recent Labs   Lab 10/13/22  0250 10/14/22  0320 10/15/22  0644   WBC 15.29* 13.53* 16.51*   HGB 11.5* 10.5* 10.6*   HCT 37.6* 34.6* 33.9*    356 322   MONO 7.3  1.1* 7.2  1.0 7.1  1.2*       Recent Labs   Lab 10/12/22  0237 10/13/22  0250 10/14/22  0321 10/14/22  1437 10/14/22  1806 10/15/22  0644   NA  --    < > 147* 140 140 139   K  --    < > 4.5  --  3.2* 3.2*   CL  --    < > 120*  --  115* 113*   CO2  --    < > 19*  --  19* 18*   BUN  --    < > 28*  --  22 16   CREATININE  --    < > 1.4  --  1.2 1.0   CALCIUM  --    < > 10.1  --  9.8 9.4   PHOS 3.5  --   --   --  1.6* 1.7*    < > = values in this interval not displayed.         Diagnostic Results:  X-Ray: Reviewed  US: Reviewed  Echo: Reviewed  ASSESSMENT/PLAN:     1. ALICIA/Hypernatremia/Hypercalcemia   -  Unknown baseline Cr 2.2 now improving  1.4  =>1.0 UA + for UTI On Abx, also per chart seems volume depleted. received 1L NS   -- Treat as UTI ? AMS vs Pscych exacerbated by UTI   -- Cr improving. Need FW but fail swallowing study   Consider his Dementia Psych Hx placing OG/NG/PEG is not reasonable   Continue IV D5% Na+ improving Avoid NS, Vit D   -- Recommend Palliative consult   -- Continue D5 for now Na+ ALICIA better   Hypokalemia and Hypomagnesemia   Replace Mg first  IV and K+   -- Please avoid nephrotoxins, including NSAIDs, aminoglycosides, IV contrast (unless absolutely necessary), gadolinium, fleets and other phosphorous-based laxatives. Caution with antibiotics.    -- Daily Renal Function Panel  -- Avoid Hypotension.  -- Renally dose all meds  2. HTN (I10) -  at range monitor for now   3. 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 Nutrition/Hypoalbuminemia (E88.09) -    Recent Labs   Lab 10/13/22  1336 10/14/22  0321 10/14/22  1806 10/15/22  0644   LABPROT 12.3  --   --   --    ALBUMIN  --    < > 2.6* 2.7*    < > = values in this interval not displayed.     Fail swallowing study Consider his Dementia Psych and his comorbidities  - placing OG/NG/PEG is not reasonable not safe    Continue IV D5%  for now       Thank you for the consult, will follow  With any question please call [phone]  Marietta Bautista MD    [facility]  BAUTISTA Mcneal MD, FACRAMO CHAVEZ MD,   MD BLAYNE Adams MD E. V. Harmon, NP    [id number]  [place], [address] [phone]

## 2022-10-15 NOTE — PROGRESS NOTES
Pharmacist Renal Dose Adjustment Note    Cheikh Yanez is a 81 y.o. male being treated with the medication cefepime    Patient Data:    Vital Signs (Most Recent):  Temp: 98.1 °F (36.7 °C) (10/15/22 1204)  Pulse: 70 (10/15/22 1204)  Resp: 18 (10/15/22 0828)  BP: (!) 116/54 (10/15/22 0828)  SpO2: 99 % (10/15/22 1204)   Vital Signs (72h Range):  Temp:  [96.1 °F (35.6 °C)-98.6 °F (37 °C)]   Pulse:  [62-92]   Resp:  [14-22]   BP: (114-156)/(54-91)   SpO2:  [94 %-100 %]      Recent Labs   Lab 10/14/22  0321 10/14/22  1806 10/15/22  0644   CREATININE 1.4 1.2 1.0     Serum creatinine: 1 mg/dL 10/15/22 0644  Estimated creatinine clearance: 56 mL/min    Medication:cefepime dose: 1 gm frequency q8h will be changed to medication:cefepime dose:1 gm frequency:q12h     Pharmacist's Name: Lisbeth Quintana  Pharmacist's Extension: [phone]

## 2022-10-16 PROBLEM — G23.1 PROGRESSIVE SUPRANUCLEAR PALSY: Status: ACTIVE | Noted: 2022-10-16

## 2022-10-16 LAB
ALBUMIN SERPL BCP-MCNC: 2.5 G/DL (ref 3.5–5.2)
ANION GAP SERPL CALC-SCNC: 7 MMOL/L (ref 8–16)
APTT BLDCRRT: 50.4 SEC (ref 21–32)
APTT BLDCRRT: 52 SEC (ref 21–32)
APTT BLDCRRT: 90.2 SEC (ref 21–32)
BASOPHILS # BLD AUTO: 0.03 K/UL (ref 0–0.2)
BASOPHILS NFR BLD: 0.2 % (ref 0–1.9)
BUN SERPL-MCNC: 12 MG/DL (ref 8–23)
CALCIUM SERPL-MCNC: 9.1 MG/DL (ref 8.7–10.5)
CHLORIDE SERPL-SCNC: 113 MMOL/L (ref 95–110)
CO2 SERPL-SCNC: 18 MMOL/L (ref 23–29)
CREAT SERPL-MCNC: 1 MG/DL (ref 0.5–1.4)
DIFFERENTIAL METHOD: ABNORMAL
EOSINOPHIL # BLD AUTO: 0.1 K/UL (ref 0–0.5)
EOSINOPHIL NFR BLD: 0.8 % (ref 0–8)
ERYTHROCYTE [DISTWIDTH] IN BLOOD BY AUTOMATED COUNT: 14.7 % (ref 11.5–14.5)
EST. GFR  (NO RACE VARIABLE): >60 ML/MIN/1.73 M^2
GLUCOSE SERPL-MCNC: 125 MG/DL (ref 70–110)
HCT VFR BLD AUTO: 35.2 % (ref 40–54)
HGB BLD-MCNC: 10.9 G/DL (ref 14–18)
IMM GRANULOCYTES # BLD AUTO: 0.07 K/UL (ref 0–0.04)
IMM GRANULOCYTES NFR BLD AUTO: 0.5 % (ref 0–0.5)
LYMPHOCYTES # BLD AUTO: 2.3 K/UL (ref 1–4.8)
LYMPHOCYTES NFR BLD: 15.6 % (ref 18–48)
MAGNESIUM SERPL-MCNC: 1.9 MG/DL (ref 1.6–2.6)
MCH RBC QN AUTO: 31.9 PG (ref 27–31)
MCHC RBC AUTO-ENTMCNC: 31 G/DL (ref 32–36)
MCV RBC AUTO: 103 FL (ref 82–98)
MONOCYTES # BLD AUTO: 1 K/UL (ref 0.3–1)
MONOCYTES NFR BLD: 7.2 % (ref 4–15)
NEUTROPHILS # BLD AUTO: 11 K/UL (ref 1.8–7.7)
NEUTROPHILS NFR BLD: 75.7 % (ref 38–73)
NRBC BLD-RTO: 0 /100 WBC
PHOSPHATE SERPL-MCNC: 2.2 MG/DL (ref 2.7–4.5)
PLATELET # BLD AUTO: 288 K/UL (ref 150–450)
PMV BLD AUTO: 9.7 FL (ref 9.2–12.9)
POTASSIUM SERPL-SCNC: 4.1 MMOL/L (ref 3.5–5.1)
RBC # BLD AUTO: 3.42 M/UL (ref 4.6–6.2)
SODIUM SERPL-SCNC: 138 MMOL/L (ref 136–145)
WBC # BLD AUTO: 14.48 K/UL (ref 3.9–12.7)

## 2022-10-16 PROCEDURE — 93005 ELECTROCARDIOGRAM TRACING: CPT

## 2022-10-16 PROCEDURE — 85730 THROMBOPLASTIN TIME PARTIAL: CPT | Mod: 91 | Performed by: INTERNAL MEDICINE

## 2022-10-16 PROCEDURE — 25000003 PHARM REV CODE 250: Performed by: PHYSICIAN ASSISTANT

## 2022-10-16 PROCEDURE — 97530 THERAPEUTIC ACTIVITIES: CPT

## 2022-10-16 PROCEDURE — 83735 ASSAY OF MAGNESIUM: CPT | Performed by: PHYSICIAN ASSISTANT

## 2022-10-16 PROCEDURE — S0166 INJ OLANZAPINE 2.5MG: HCPCS | Performed by: NURSE PRACTITIONER

## 2022-10-16 PROCEDURE — 36415 COLL VENOUS BLD VENIPUNCTURE: CPT | Performed by: INTERNAL MEDICINE

## 2022-10-16 PROCEDURE — 99900035 HC TECH TIME PER 15 MIN (STAT)

## 2022-10-16 PROCEDURE — 11000001 HC ACUTE MED/SURG PRIVATE ROOM

## 2022-10-16 PROCEDURE — 97165 OT EVAL LOW COMPLEX 30 MIN: CPT

## 2022-10-16 PROCEDURE — 85025 COMPLETE CBC W/AUTO DIFF WBC: CPT | Performed by: PHYSICIAN ASSISTANT

## 2022-10-16 PROCEDURE — 63600175 PHARM REV CODE 636 W HCPCS: Performed by: PHYSICIAN ASSISTANT

## 2022-10-16 PROCEDURE — 25000003 PHARM REV CODE 250: Performed by: NURSE PRACTITIONER

## 2022-10-16 PROCEDURE — 93010 ELECTROCARDIOGRAM REPORT: CPT | Mod: ,,, | Performed by: INTERNAL MEDICINE

## 2022-10-16 PROCEDURE — 27200966 HC CLOSED SUCTION SYSTEM

## 2022-10-16 PROCEDURE — 93010 EKG 12-LEAD: ICD-10-PCS | Mod: ,,, | Performed by: INTERNAL MEDICINE

## 2022-10-16 PROCEDURE — 94761 N-INVAS EAR/PLS OXIMETRY MLT: CPT

## 2022-10-16 PROCEDURE — 80069 RENAL FUNCTION PANEL: CPT | Performed by: PHYSICIAN ASSISTANT

## 2022-10-16 RX ORDER — METOPROLOL TARTRATE 1 MG/ML
5 INJECTION, SOLUTION INTRAVENOUS EVERY 6 HOURS
Status: DISCONTINUED | OUTPATIENT
Start: 2022-10-16 | End: 2022-10-20 | Stop reason: HOSPADM

## 2022-10-16 RX ADMIN — DEXTROSE: 5 SOLUTION INTRAVENOUS at 03:10

## 2022-10-16 RX ADMIN — HEPARIN SODIUM 12 UNITS/KG/HR: 10000 INJECTION, SOLUTION INTRAVENOUS at 03:10

## 2022-10-16 RX ADMIN — CEFEPIME 1 G: 1 INJECTION, POWDER, FOR SOLUTION INTRAMUSCULAR; INTRAVENOUS at 02:10

## 2022-10-16 RX ADMIN — OLANZAPINE 2.5 MG: 10 INJECTION, POWDER, LYOPHILIZED, FOR SOLUTION INTRAMUSCULAR at 03:10

## 2022-10-16 RX ADMIN — METOROPROLOL TARTRATE 5 MG: 5 INJECTION, SOLUTION INTRAVENOUS at 09:10

## 2022-10-16 RX ADMIN — OLANZAPINE 2.5 MG: 10 INJECTION, POWDER, LYOPHILIZED, FOR SOLUTION INTRAMUSCULAR at 10:10

## 2022-10-16 RX ADMIN — DEXTROSE: 5 SOLUTION INTRAVENOUS at 05:10

## 2022-10-16 RX ADMIN — CEFEPIME 1 G: 1 INJECTION, POWDER, FOR SOLUTION INTRAMUSCULAR; INTRAVENOUS at 01:10

## 2022-10-16 NOTE — ASSESSMENT & PLAN NOTE
Bipolar disorder    Transferred from [facility] due to medical issues  Stable  PEC in place  Consult Psychiatry for continued management   Psych recommended d/c PEC

## 2022-10-16 NOTE — ASSESSMENT & PLAN NOTE
HCAP (healthcare-associated pneumonia)  UTI (urinary tract infection), Hematuria  Leukocytosis    Continuous Cardiac monitoring  Trend troponin, trended down  Oxygen prn  duonebs prn  IVFs   Pt was started on macrobid outpatient on 10/11.  Continue IV ceftriaxone and azithro  -UC multiple organisms in predominance, called micro lab and spoke with Yo Huff, will look at plate again.  Update 10/14 gram negative rods and proteus, continue ceftriaxone and azithro.  10/15 Proteus vulgaris resistant to ceftriaxone, changed to cefepime.  -US retroperitoneal: Probable blood products in the urinary bladder.  A mass is not entirely excluded, follow-up is recommended. No hydronephrosis.   BC NGTD  Psychiatry following and recommended Neurology consult, will place.  neurochecks   -failed swallow study, nutrition consult for possible TF  -10/15 WBC slight increase, pt afebrile.  Will check procal and UC to be updated this afternoon.  Consider broadening antibiotics.  -Vit B12 722 appropriate, Vit D deficiency see below

## 2022-10-16 NOTE — PLAN OF CARE
Problem: Occupational Therapy  Goal: Occupational Therapy Goal  Outcome: Met   Pt found in supine & able to open eyes/awaken w/ noxious stim. Pt perf rolling Karina w/ total A x 2 attempts each for application of waffle air mattress & changing of linens. Donned B Zflex w/ total A. Pt resistive to ROM BU/LEs.  Edu/tx re: general safety techs, however pt w/o indication of comprehension.  **Pt unable to follow commands & demo's no potential to increase fxnl status.    Per OT eval, pt not appropriate for svcs 2/2 poor cognitive status. No further acute OT svcs indicated at this time. DC acute OT.

## 2022-10-16 NOTE — SUBJECTIVE & OBJECTIVE
"Interval History:  no acute events overnight, no new complaints.  Spoke with RN Elda, no improvement over the weekend.  Pt mumbles, occasionally says his name and gets very agitated with any tests or medical care.  Noted baseline described as "vegetative state" by caregiver and pt oriented to person and situation at baseline.    Review of Systems   Unable to perform ROS: Mental status change   Objective:     Vital Signs (Most Recent):  Temp: 97.9 °F (36.6 °C) (10/16/22 0730)  Pulse: 60 (10/16/22 0730)  Resp: 16 (10/16/22 0730)  BP: 133/71 (10/16/22 0730)  SpO2: 96 % (10/16/22 0730) Vital Signs (24h Range):  Temp:  [96.2 °F (35.7 °C)-98.1 °F (36.7 °C)] 97.9 °F (36.6 °C)  Pulse:  [52-95] 60  Resp:  [16-20] 16  SpO2:  [96 %-100 %] 96 %  BP: (109-143)/(64-78) 133/71     Weight: 66.2 kg (145 lb 15.1 oz)  Body mass index is 22.86 kg/m².    Intake/Output Summary (Last 24 hours) at 10/16/2022 1005  Last data filed at 10/16/2022 0412  Gross per 24 hour   Intake 0 ml   Output 2299 ml   Net -2299 ml      Physical Exam  HENT:      Head: Normocephalic and atraumatic.   Cardiovascular:      Rate and Rhythm: Normal rate.      Pulses: Normal pulses.   Pulmonary:      Effort: No respiratory distress.      Breath sounds: No wheezing.   Abdominal:      General: Bowel sounds are normal.      Palpations: Abdomen is soft.   Musculoskeletal:         General: No deformity.      Cervical back: Neck supple.   Skin:     General: Skin is warm and dry.   Neurological:      Mental Status: He is alert. He is disoriented.      GCS: GCS eye subscore is 3. GCS verbal subscore is 3. GCS motor subscore is 5.      Comments: At baseline described as "vegetative state" oriented to person and situation.  Pt close to baseline.       Significant Labs: All pertinent labs within the past 24 hours have been reviewed.    Significant Imaging: I have reviewed all pertinent imaging results/findings within the past 24 hours.  "

## 2022-10-16 NOTE — PROGRESS NOTES
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 Patient unable to provide any history. Per RN, patient normally oriented to self and now is somnolent.  ED findings:  WBCs 16.91, potassium 5.2, creatinine 2.2, troponin 0.120, EKG without ischemic changes, u/a concerning for infectious process,  chest x-ray showed a right basilar airspace opacity, covid pending, blood cultures pending.  Patient admitted to hospital medicine observation unit for further medical management.     Facility Medlist:  atorvastatin 80 mg qhs, finasteride 5 mg qhs, lisinopril 40 mg daily, melatonin 15 mg qhs, metoprolol ER 25 mg daily, MVI daily, spironolactone 25 mg qhs, thiamine 100 mg daily, duloxetine 40 mg daily, eliquis 5 mg bid daily, trazodone 25 mg daily, as needed mag hydroxide, as need maalox, as need tylenol 325 mg q 4 prn, as needed nitro      Overview/Hospital Course:  Pt admitted for AMS, pt sent from [facility].  PEC in place.  UA notable for infection, pt started on ceftriaxone.  Psychiatry consulted, PEC discontinued.  Noted baseline by caregiver described as "vegetative state" and at baseline oriented to person and situation.  It was recommended to hold lithium and haldol with ALICIA.  Depacon recommended however nationwide supply chain issue and no reasonable alternative.  Zyprexa prn agitation.  Folate/Thiamine/MVI.  RD consult as pt failed swallow assessment.  Neurology consult " "for encephalopathy.  10/13 UC with multiple organisms none in predominance, micro lab will review plate again 10/13.  10/14 UC with gram negative rods and proteus species.  Neurology consulted and suspect metabolic encephalopathy.  Nephrology agrees with D5 for treatment of hypernatremia with noted improvement.      Interval History:  no acute events overnight, no new complaints.  Spoke with MANUELITO Schroeder, no improvement over the weekend.  Pt mumbles, occasionally says his name and gets very agitated with any tests or medical care.  Noted baseline described as "vegetative state" by caregiver and pt oriented to person and situation at baseline.    Review of Systems   Unable to perform ROS: Mental status change   Objective:     Vital Signs (Most Recent):  Temp: 97.9 °F (36.6 °C) (10/16/22 0730)  Pulse: 60 (10/16/22 0730)  Resp: 16 (10/16/22 0730)  BP: 133/71 (10/16/22 0730)  SpO2: 96 % (10/16/22 0730) Vital Signs (24h Range):  Temp:  [96.2 °F (35.7 °C)-98.1 °F (36.7 °C)] 97.9 °F (36.6 °C)  Pulse:  [52-95] 60  Resp:  [16-20] 16  SpO2:  [96 %-100 %] 96 %  BP: (109-143)/(64-78) 133/71     Weight: 66.2 kg (145 lb 15.1 oz)  Body mass index is 22.86 kg/m².    Intake/Output Summary (Last 24 hours) at 10/16/2022 1005  Last data filed at 10/16/2022 0412  Gross per 24 hour   Intake 0 ml   Output 2299 ml   Net -2299 ml      Physical Exam  HENT:      Head: Normocephalic and atraumatic.   Cardiovascular:      Rate and Rhythm: Normal rate.      Pulses: Normal pulses.   Pulmonary:      Effort: No respiratory distress.      Breath sounds: No wheezing.   Abdominal:      General: Bowel sounds are normal.      Palpations: Abdomen is soft.   Musculoskeletal:         General: No deformity.      Cervical back: Neck supple.   Skin:     General: Skin is warm and dry.   Neurological:      Mental Status: He is alert. He is disoriented.      GCS: GCS eye subscore is 3. GCS verbal subscore is 3. GCS motor subscore is 5.      Comments: At baseline " "described as "vegetative state" oriented to person and situation.  Pt close to baseline.       Significant Labs: All pertinent labs within the past 24 hours have been reviewed.    Significant Imaging: I have reviewed all pertinent imaging results/findings within the past 24 hours.      Assessment/Plan:      * Encephalopathy due to infection  HCAP (healthcare-associated pneumonia)  UTI (urinary tract infection), Hematuria  Leukocytosis    Continuous Cardiac monitoring  Trend troponin, trended down  Oxygen prn  duonebs prn  IVFs   Pt was started on macrobid outpatient on 10/11.  Continue IV ceftriaxone and azithro  -UC multiple organisms in predominance, called micro lab and spoke with Yo Huff, will look at plate again.  Update 10/14 gram negative rods and proteus, continue ceftriaxone and azithro.  10/15 Proteus vulgaris resistant to ceftriaxone, changed to cefepime.  -US retroperitoneal: Probable blood products in the urinary bladder.  A mass is not entirely excluded, follow-up is recommended. No hydronephrosis.   BC NGTD  Psychiatry following and recommended Neurology consult, will place.  neurochecks   -failed swallow study, nutrition consult for possible TF  -10/15 WBC slight increase, pt afebrile.  Will check procal and UC to be updated this afternoon.  Consider broadening antibiotics.  -Vit B12 722 appropriate, Vit D deficiency see below    Progressive supranuclear palsy  Mri brain with atrophy of midbrain with relative preservation of zoie suggesting Progressive Supranuclear palsy diagnosis. Encephalopathy secondary to infectious/ metabolic deficits.   -Neuro recs PT/OT/ST      Hypernatremia  Hypomagnesemia  Hypokalemia  Hypophosphatemia  Suspect hypovolemic hypernatremia however pt does have ALICIA and history of lithium use.  Will consult nephrology, possible diabetes insipidus  Lithium level 1.5 on 10/6/22, elevated  -Nephrology recs appreciated, agree with D5.  No NS.  Improved.  -Replacing electrolytes " IV    Congestive heart failure  Appears euvolemic  Continuous cardiac monitoring  Trend troponins, trended down  Facility medlist: atorvastatin 80 mg qhs, lisinopril 40 mg daily, metoprolol succinated ER 25 mg daily, spironolactone 25 mg daily    Results for orders placed during the hospital encounter of 10/11/22    Echo    Interpretation Summary  · The left ventricle is normal in size with concentric remodeling and normal systolic function.  · The estimated ejection fraction is 55%.  · A diastolic pattern consistent with atrial fibrillation observed.  · Mild-to-moderate mitral regurgitation.  · There is mild aortic valve stenosis.  · Aortic valve area is 1.36 cm2; peak velocity is 2.16 m/s; mean gradient is 11 mmHg.  · Elevated central venous pressure (15 mmHg).  · The estimated PA systolic pressure is 45 mmHg.  · Mild tricuspid regurgitation.  · Normal right ventricular size with normal right ventricular systolic function.  · There is pulmonary hypertension.      CAD (coronary artery disease)  See above, resume statin once passes swallow assessment      History of CVA (cerebrovascular accident)  Facility medlist: atorvastatin 80 mg qhs, finasteride 5 mg qhs, lisinopril 40 mg daily, melatonin 15 mg qhs, metoprolol ER 25 mg daily, MVI daily, spironolactone 25 mg qhs, thiamine 100 mg daily, duloxetine 40 mg daily, eliquis 5 mg bid daily, trazodone 25 mg daily, as needed mag hydroxide, as need maalox, as need tylenol 325 mg q 4 prn, as needed nitro  -failed swallow study, oral meds on hold  -heparin drip initiated      A-fib  Heart rate controlled  Continuous cardiac monitoring  [id number] 5  Pt was on eliquis 5 mg bid, failed swallow study.  Start low intensity heparin drip.    Results for orders placed during the hospital encounter of 10/11/22    Echo    Interpretation Summary  · The left ventricle is normal in size with concentric remodeling and normal systolic function.  · The estimated ejection fraction is  55%.  · A diastolic pattern consistent with atrial fibrillation observed.  · Mild-to-moderate mitral regurgitation.  · There is mild aortic valve stenosis.  · Aortic valve area is 1.36 cm2; peak velocity is 2.16 m/s; mean gradient is 11 mmHg.  · Elevated central venous pressure (15 mmHg).  · The estimated PA systolic pressure is 45 mmHg.  · Mild tricuspid regurgitation.  · Normal right ventricular size with normal right ventricular systolic function.  · There is pulmonary hypertension.          Vitamin D deficiency  Vit D 14, replace orally if able to pass swallow eval      History of pulmonary embolism  No signs of respiratory distress  Monitor  Start low intensity heparin as failed swallow assessment, eliquis on hold      Hypertension  Stable, see medlist above      Suicidal ideation  Bipolar disorder    Transferred from [facility] due to medical issues  Stable  PEC in place  Consult Psychiatry for continued management   Psych recommended d/c PEC      Elevated troponin  Continuous cardiac monitoring  ekg without ischemic changes   Troponin trended down        VTE Risk Mitigation (From admission, onward)         Ordered     heparin 25,000 units in dextrose 5% (100 units/ml) IV bolus from bag - ADDITIONAL PRN BOLUS - 60 units/kg  As needed (PRN)        Question:  Heparin Infusion Adjustment (DO NOT MODIFY ANSWER)  Answer:  \[email or web address]\Images\Pharmacy\HeparinInfusions\heparin LOW INTENSITY nomogram for [facility] [id number].pdf    10/13/22 1326     heparin 25,000 units in dextrose 5% (100 units/ml) IV bolus from bag - ADDITIONAL PRN BOLUS - 30 units/kg  As needed (PRN)        Question:  Heparin Infusion Adjustment (DO NOT MODIFY ANSWER)  Answer:  \[email or web address]\epic\Images\Pharmacy\HeparinInfusions\heparin LOW INTENSITY nomogram for [facility] [id number].pdf    10/13/22 1326     heparin 25,000 units in dextrose 5% 250 mL (100 units/mL) infusion LOW INTENSITY nomogram - [facility]  Continuous        Question Answer Comment    Heparin Infusion Adjustment (DO NOT MODIFY ANSWER) \\[email or web address]\epic\Images\Pharmacy\HeparinInfusions\heparin LOW INTENSITY nomogram for [facility] [id number].pdf    Begin at (in units/kg/hr) 12        10/13/22 1326                Discharge Planning   JAYSON: 10/17/2022     Code Status: Full Code   Is the patient medically ready for discharge?:     Reason for patient still in hospital (select all that apply): Patient trending condition and Consult recommendations  Discharge Plan A: [facility]                  Miryam Cadet PA-C  Department of [facility] Medicine   [facility]

## 2022-10-16 NOTE — CARE UPDATE
Spoke with pt's medical POA Jc Melgar.  Notified we changed antibiotics 10/15 to cefepime with some improvement in WBC.  Electrolytes and kidney function has improved.  No improvement in mental status per provider and MANUELITO Schroeder.  Discussed with POA that we have consulted palliative care team for tomorrow.  POA confirmed DNR status.  POA states caregiver/friend David Lopez had already submitted paperwork to [facility].

## 2022-10-16 NOTE — PT/OT/SLP PROGRESS
Speech Language Pathology      Cheikh Yanez  MRN: [id number]        Pt still not appropriate for oral diet, pt continues with wet respirations, speech is garbled and requires oral suctioning of his own secretions. Pt also with limited wakeful state and has demonstrated poor command following. Pt does not allow oral care when attempted. Given current status, agree with Palliative consult to discuss long term goals of care including nutrition and hydration.         10/16/2022

## 2022-10-16 NOTE — ASSESSMENT & PLAN NOTE
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 Encephalopathy secondary to infectious/ metabolic deficits.   -Neuro recs PT/OT/ST

## 2022-10-16 NOTE — PT/OT/SLP PROGRESS
Physical Therapy Screen and Discharge Summary      Patient Name:  Cheikh Yanez   MRN:  [id number]    Screened performed today with chart review and unsuccessful attempt this morning to converse with pt as pt mumbling incoherently and resistant to movement.  Pt is not following commands and demo'd no understanding of education provided. Spoke with pt's nurse and VERONICA Villaseñor both agreeing that pt's cognitive status is not appropriate for therapy services at this time.  Will discharge PT at this time and will follow up if status improves and MD orders in future.

## 2022-10-16 NOTE — PT/OT/SLP EVAL
Occupational Therapy   Evaluation and Discharge Note    Name: Cheikh Yanez  MRN: [id number]  Admitting Diagnosis:  Encephalopathy due to infection   Recent Surgery: * No surgery found *      Recommendations:     Discharge Recommendations: psychiatric facility, nursing facility, basic  Discharge Equipment Recommendations:     Barriers to discharge:  None    Assessment:   Per OT eval, pt not appropriate for svcs 2/2 poor cognitive status. No further acute OT svcs indicated at this time. DC acute OT.    Cheikh Yanez is a 81 y.o. male with a medical diagnosis of Encephalopathy due to infection. At this time, patient is functioning at their prior level of function and does not require further acute OT services.     Plan:     During this hospitalization, patient does not require further acute OT services.  Please re-consult if situation changes.    Plan of Care Reviewed with: patient    Subjective     Chief Complaint: AMS 2/2 UTI  Patient/Family Comments/goals: unstated    Occupational Profile:  Living Environment: [facility]  Previous level of function: AO1 at baseline per chart  Roles and Routines: unknown  Equipment Used at home:   (unknown)  Assistance upon Discharge: will require 24/7 Sup/Asst    Pain/Comfort:  Pain Rating 1:  (yelled w/ rolling)  Pain Addressed 1: Reposition, Distraction  Pain Rating Post-Intervention 1:  (no c/o at rest)    Patients cultural, spiritual, Restoration conflicts given the current situation:      Objective:     Communicated with: nssuman prior to session.  Patient found HOB elevated with bed alarm, telemetry, Condom Catheter upon OT entry to room.    General Precautions: Standard, fall, hearing impaired, NPO, aspiration   Orthopedic Precautions:N/A   Braces: N/A  Respiratory Status: Room air     Occupational Performance:    Bed Mobility:    Patient completed Rolling/Turning to Left with  dependent  Patient completed Rolling/Turning to Right with dependent    Functional  Mobility/Transfers:  NT  Functional Mobility: Dependent    Activities of Daily Living:  Lower Body Dressing: dependence doff/don B Zflex    Cognitive/Visual Perceptual:  Poor & inconsistent eye contact/acknowledgement to name    Physical Exam:  Mult skin tears throughout BUEs  Signif resistance to AAROM B UEs  Good composite  Karina    AMPAC 6 Click ADL:  AMPAC Total Score: 6    Treatment & Education:  Pt found in supine & able to open eyes/awaken w/ noxious stim. Pt perf rolling Karina w/ total A x 2 attempts each for application of waffle air mattress & changing of linens. Donned B Zflex w/ total A. Pt resistive to ROM BU/LEs.  Edu/tx re: general safety techs, however pt w/o indication of comprehension.  **Pt unable to follow commands & demo's no potential to increase fxnl status.      Patient left HOB elevated with all lines intact, call button in reach, bed alarm on, and nsg present    GOALS:   Multidisciplinary Problems       Occupational Therapy Goals       Not on file              Multidisciplinary Problems (Resolved)          Problem: Occupational Therapy    Goal Priority Disciplines Outcome Interventions   Occupational Therapy Goal   (Resolved)     OT, PT/OT Met                        History:     No past medical history on file.    No past surgical history on file.    Time Tracking:     OT Date of Treatment: 10/16/22  OT Start Time: 0842  OT Stop Time: 0917  OT Total Time (min): 35 min    Billable Minutes:Evaluation 10  Therapeutic Activity 25  Total Time 35    10/16/2022

## 2022-10-16 NOTE — PROGRESS NOTES
Nephrology Progress Note       Consult Requested By: Mike Woods MD  Reason for Consult: ALICIA Hypernatremia    SUBJECTIVE:          Review of Systems   Unable to perform ROS: Acuity of condition     No past medical history on file.  No past surgical history on file.  No family history on file.       Review of patient's allergies indicates:  No Known Allergies         OBJECTIVE:     Vital Signs (Most Recent)  Vitals:    10/16/22 0429 10/16/22 0500 10/16/22 0714 10/16/22 0730   BP:    133/71   BP Location:    Right arm   Patient Position:    Lying   Pulse: (!) 52  67 60   Resp: 18  16 16   Temp:    97.9 °F (36.6 °C)   TempSrc:    Axillary   SpO2: 100%  97% 96%   Weight:  66.2 kg (145 lb 15.1 oz)     Height:                     Medications:   ceFEPime (MAXIPIME) IVPB  1 g Intravenous Q12H           Physical Exam  Vitals and nursing note reviewed.   Constitutional:       General: He is not in acute distress.     Appearance: He is ill-appearing. He is not diaphoretic.   HENT:      Head: Normocephalic and atraumatic.      Mouth/Throat:      Pharynx: No oropharyngeal exudate.   Eyes:      General: No scleral icterus.     Conjunctiva/sclera: Conjunctivae normal.      Pupils: Pupils are equal, round, and reactive to light.   Cardiovascular:      Rate and Rhythm: Normal rate and regular rhythm.      Heart sounds: Normal heart sounds. No murmur heard.  Pulmonary:      Effort: No respiratory distress.      Breath sounds: Rhonchi present.   Abdominal:      General: Bowel sounds are normal. There is no distension.      Palpations: Abdomen is soft.      Tenderness: There is no abdominal tenderness.   Musculoskeletal:         General: Normal range of motion.      Cervical back: Normal range of motion and neck supple.   Skin:     General: Skin is warm and dry.      Findings: No erythema.   Neurological:      Mental Status: He is alert. He is disoriented and confused.      Cranial Nerves: No cranial nerve deficit.        Laboratory:  Recent Labs   Lab 10/14/22  0320 10/15/22  0644 10/16/22  0348   WBC 13.53* 16.51* 14.48*   HGB 10.5* 10.6* 10.9*   HCT 34.6* 33.9* 35.2*    322 288   MONO 7.2  1.0 7.1  1.2* 7.2  1.0       Recent Labs   Lab 10/14/22  1806 10/15/22  0644 10/16/22  0348    139 138   K 3.2* 3.2* 4.1   * 113* 113*   CO2 19* 18* 18*   BUN 22 16 12   CREATININE 1.2 1.0 1.0   CALCIUM 9.8 9.4 9.1   PHOS 1.6* 1.7* 2.2*         Diagnostic Results:  X-Ray: Reviewed  US: Reviewed  Echo: Reviewed  ASSESSMENT/PLAN:     1. ALICIA/Hypernatremia/Hypercalcemia   -  Unknown baseline Cr 2.2 now improving  1.4  =>1.0 UA + for UTI On Abx   -- Cr improving.but kiko AMS most likely baseline    Need FW but fail swallowing study   Consider his Dementia Psych Hx placing OG/NG/PEG is not reasonable   Continue IV D5% Na+ improving Avoid NS, Vit D - for now   -- Recommend Palliative consult   -- Continue D5 for now Na+ ALICIA better   Hypokalemia and Hypomagnesemia   Replace Mg and K+ as needed      -- Please avoid nephrotoxins, including NSAIDs, aminoglycosides, IV contrast (unless absolutely necessary), gadolinium, fleets and other phosphorous-based laxatives. Caution with antibiotics.    -- Daily Renal Function Panel  -- Avoid Hypotension.  -- Renally dose all meds  2. HTN (I10) -  at range monitor for now   3. 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 Nutrition/Hypoalbuminemia (E88.09) -    Recent Labs   Lab 10/13/22  1336 10/14/22  0321 10/15/22  0644 10/16/22  0348   LABPROT 12.3  --   --   --    ALBUMIN  --    < > 2.7* 2.5*    < > = values in this interval not displayed.     Fail swallowing study Consider his Dementia Psych and his comorbidities  - placing OG/NG/PEG is not reasonable not safe  - AMS/dementia   Continue IV D5%  for now       Thank you for the consult, will follow  With any question please call [phone]  Marietta Bautista MD    [facility]  BAUTISTA Mcneal MD, FACRAOM CHAVEZ MD,   MD BLAYNE Adams MD E. V. Harmon, NP    [address]  [place], [address]  [phone]

## 2022-10-16 NOTE — ASSESSMENT & PLAN NOTE
Heart rate controlled  Continuous cardiac monitoring  [id number] 5  Pt was on eliquis 5 mg bid, failed swallow study.  Start low intensity heparin drip.    Results for orders placed during the hospital encounter of 10/11/22    Echo    Interpretation Summary  · The left ventricle is normal in size with concentric remodeling and normal systolic function.  · The estimated ejection fraction is 55%.  · A diastolic pattern consistent with atrial fibrillation observed.  · Mild-to-moderate mitral regurgitation.  · There is mild aortic valve stenosis.  · Aortic valve area is 1.36 cm2; peak velocity is 2.16 m/s; mean gradient is 11 mmHg.  · Elevated central venous pressure (15 mmHg).  · The estimated PA systolic pressure is 45 mmHg.  · Mild tricuspid regurgitation.  · Normal right ventricular size with normal right ventricular systolic function.  · There is pulmonary hypertension.

## 2022-10-17 PROBLEM — Z71.89 GOALS OF CARE, COUNSELING/DISCUSSION: Status: ACTIVE | Noted: 2022-10-17

## 2022-10-17 PROBLEM — Z51.5 PALLIATIVE CARE ENCOUNTER: Status: ACTIVE | Noted: 2022-10-17

## 2022-10-17 PROBLEM — Z95.0 CARDIAC PACEMAKER IN SITU: Status: ACTIVE | Noted: 2022-10-17

## 2022-10-17 LAB
ALBUMIN SERPL BCP-MCNC: 2.6 G/DL (ref 3.5–5.2)
ANION GAP SERPL CALC-SCNC: 6 MMOL/L (ref 8–16)
APTT BLDCRRT: 44.7 SEC (ref 21–32)
BACTERIA BLD CULT: NORMAL
BACTERIA BLD CULT: NORMAL
BASOPHILS # BLD AUTO: 0.02 K/UL (ref 0–0.2)
BASOPHILS NFR BLD: 0.2 % (ref 0–1.9)
BUN SERPL-MCNC: 11 MG/DL (ref 8–23)
CALCIUM SERPL-MCNC: 9.5 MG/DL (ref 8.7–10.5)
CHLORIDE SERPL-SCNC: 113 MMOL/L (ref 95–110)
CO2 SERPL-SCNC: 21 MMOL/L (ref 23–29)
CREAT SERPL-MCNC: 1 MG/DL (ref 0.5–1.4)
DIFFERENTIAL METHOD: ABNORMAL
EOSINOPHIL # BLD AUTO: 0.1 K/UL (ref 0–0.5)
EOSINOPHIL NFR BLD: 0.7 % (ref 0–8)
ERYTHROCYTE [DISTWIDTH] IN BLOOD BY AUTOMATED COUNT: 14.9 % (ref 11.5–14.5)
EST. GFR  (NO RACE VARIABLE): >60 ML/MIN/1.73 M^2
GLUCOSE SERPL-MCNC: 126 MG/DL (ref 70–110)
HCT VFR BLD AUTO: 35.7 % (ref 40–54)
HGB BLD-MCNC: 11 G/DL (ref 14–18)
IMM GRANULOCYTES # BLD AUTO: 0.07 K/UL (ref 0–0.04)
IMM GRANULOCYTES NFR BLD AUTO: 0.6 % (ref 0–0.5)
LYMPHOCYTES # BLD AUTO: 1.6 K/UL (ref 1–4.8)
LYMPHOCYTES NFR BLD: 13.3 % (ref 18–48)
MAGNESIUM SERPL-MCNC: 1.8 MG/DL (ref 1.6–2.6)
MCH RBC QN AUTO: 32 PG (ref 27–31)
MCHC RBC AUTO-ENTMCNC: 30.8 G/DL (ref 32–36)
MCV RBC AUTO: 104 FL (ref 82–98)
MONOCYTES # BLD AUTO: 0.7 K/UL (ref 0.3–1)
MONOCYTES NFR BLD: 5.8 % (ref 4–15)
NEUTROPHILS # BLD AUTO: 9.8 K/UL (ref 1.8–7.7)
NEUTROPHILS NFR BLD: 79.4 % (ref 38–73)
NRBC BLD-RTO: 0 /100 WBC
PHOSPHATE SERPL-MCNC: 2.3 MG/DL (ref 2.7–4.5)
PLATELET # BLD AUTO: 297 K/UL (ref 150–450)
PMV BLD AUTO: 10.1 FL (ref 9.2–12.9)
POTASSIUM SERPL-SCNC: 3.8 MMOL/L (ref 3.5–5.1)
RBC # BLD AUTO: 3.44 M/UL (ref 4.6–6.2)
SODIUM SERPL-SCNC: 140 MMOL/L (ref 136–145)
WBC # BLD AUTO: 12.36 K/UL (ref 3.9–12.7)

## 2022-10-17 PROCEDURE — 99497 PR ADVNCD CARE PLAN 30 MIN: ICD-10-PCS | Mod: 25,,,

## 2022-10-17 PROCEDURE — 63600175 PHARM REV CODE 636 W HCPCS: Performed by: STUDENT IN AN ORGANIZED HEALTH CARE EDUCATION/TRAINING PROGRAM

## 2022-10-17 PROCEDURE — 90833 PSYTX W PT W E/M 30 MIN: CPT | Mod: ,,, | Performed by: PSYCHIATRY & NEUROLOGY

## 2022-10-17 PROCEDURE — 99223 PR INITIAL HOSPITAL CARE,LEVL III: ICD-10-PCS | Mod: ,,,

## 2022-10-17 PROCEDURE — 63600175 PHARM REV CODE 636 W HCPCS: Performed by: NURSE PRACTITIONER

## 2022-10-17 PROCEDURE — 63600175 PHARM REV CODE 636 W HCPCS: Performed by: PHYSICIAN ASSISTANT

## 2022-10-17 PROCEDURE — 99233 SBSQ HOSP IP/OBS HIGH 50: CPT | Mod: ,,, | Performed by: PSYCHIATRY & NEUROLOGY

## 2022-10-17 PROCEDURE — 36415 COLL VENOUS BLD VENIPUNCTURE: CPT | Performed by: INTERNAL MEDICINE

## 2022-10-17 PROCEDURE — 11000001 HC ACUTE MED/SURG PRIVATE ROOM

## 2022-10-17 PROCEDURE — 90833 PR PSYCHOTHERAPY W/PATIENT W/E&M, 30 MIN (ADD ON): ICD-10-PCS | Mod: ,,, | Performed by: PSYCHIATRY & NEUROLOGY

## 2022-10-17 PROCEDURE — 85025 COMPLETE CBC W/AUTO DIFF WBC: CPT | Performed by: PHYSICIAN ASSISTANT

## 2022-10-17 PROCEDURE — 94761 N-INVAS EAR/PLS OXIMETRY MLT: CPT

## 2022-10-17 PROCEDURE — 99223 1ST HOSP IP/OBS HIGH 75: CPT | Mod: ,,,

## 2022-10-17 PROCEDURE — 93010 ELECTROCARDIOGRAM REPORT: CPT | Mod: ,,, | Performed by: INTERNAL MEDICINE

## 2022-10-17 PROCEDURE — 85730 THROMBOPLASTIN TIME PARTIAL: CPT | Performed by: INTERNAL MEDICINE

## 2022-10-17 PROCEDURE — 99223 PR INITIAL HOSPITAL CARE,LEVL III: ICD-10-PCS | Mod: ,,, | Performed by: NURSE PRACTITIONER

## 2022-10-17 PROCEDURE — 83735 ASSAY OF MAGNESIUM: CPT | Performed by: PHYSICIAN ASSISTANT

## 2022-10-17 PROCEDURE — 93005 ELECTROCARDIOGRAM TRACING: CPT

## 2022-10-17 PROCEDURE — 99497 ADVNCD CARE PLAN 30 MIN: CPT | Mod: 25,,,

## 2022-10-17 PROCEDURE — 80069 RENAL FUNCTION PANEL: CPT | Performed by: PHYSICIAN ASSISTANT

## 2022-10-17 PROCEDURE — 92526 ORAL FUNCTION THERAPY: CPT

## 2022-10-17 PROCEDURE — 25000003 PHARM REV CODE 250: Performed by: PHYSICIAN ASSISTANT

## 2022-10-17 PROCEDURE — 99223 1ST HOSP IP/OBS HIGH 75: CPT | Mod: ,,, | Performed by: NURSE PRACTITIONER

## 2022-10-17 PROCEDURE — 93010 EKG 12-LEAD: ICD-10-PCS | Mod: ,,, | Performed by: INTERNAL MEDICINE

## 2022-10-17 PROCEDURE — 25000003 PHARM REV CODE 250: Performed by: STUDENT IN AN ORGANIZED HEALTH CARE EDUCATION/TRAINING PROGRAM

## 2022-10-17 PROCEDURE — 99900035 HC TECH TIME PER 15 MIN (STAT)

## 2022-10-17 PROCEDURE — 99233 PR SUBSEQUENT HOSPITAL CARE,LEVL III: ICD-10-PCS | Mod: ,,, | Performed by: PSYCHIATRY & NEUROLOGY

## 2022-10-17 RX ORDER — DIPHENHYDRAMINE HYDROCHLORIDE 50 MG/ML
25 INJECTION INTRAMUSCULAR; INTRAVENOUS NIGHTLY PRN
Status: DISCONTINUED | OUTPATIENT
Start: 2022-10-17 | End: 2022-10-20 | Stop reason: HOSPADM

## 2022-10-17 RX ADMIN — DEXTROSE: 5 SOLUTION INTRAVENOUS at 10:10

## 2022-10-17 RX ADMIN — DIPHENHYDRAMINE HYDROCHLORIDE 25 MG: 50 INJECTION, SOLUTION INTRAMUSCULAR; INTRAVENOUS at 10:10

## 2022-10-17 RX ADMIN — GLYCOPYRROLATE 0.1 MG: 0.2 INJECTION, SOLUTION INTRAMUSCULAR; INTRAVITREAL at 03:10

## 2022-10-17 RX ADMIN — GLYCOPYRROLATE 0.1 MG: 0.2 INJECTION, SOLUTION INTRAMUSCULAR; INTRAVITREAL at 05:10

## 2022-10-17 RX ADMIN — METOROPROLOL TARTRATE 5 MG: 5 INJECTION, SOLUTION INTRAVENOUS at 03:10

## 2022-10-17 RX ADMIN — THIAMINE HYDROCHLORIDE 500 MG: 100 INJECTION, SOLUTION INTRAMUSCULAR; INTRAVENOUS at 10:10

## 2022-10-17 RX ADMIN — HEPARIN SODIUM 9 UNITS/KG/HR: 10000 INJECTION, SOLUTION INTRAVENOUS at 05:10

## 2022-10-17 RX ADMIN — CEFEPIME 1 G: 1 INJECTION, POWDER, FOR SOLUTION INTRAMUSCULAR; INTRAVENOUS at 01:10

## 2022-10-17 RX ADMIN — GLYCOPYRROLATE 0.1 MG: 0.2 INJECTION, SOLUTION INTRAMUSCULAR; INTRAVITREAL at 10:10

## 2022-10-17 RX ADMIN — CEFEPIME 1 G: 1 INJECTION, POWDER, FOR SOLUTION INTRAMUSCULAR; INTRAVENOUS at 03:10

## 2022-10-17 RX ADMIN — METOROPROLOL TARTRATE 5 MG: 5 INJECTION, SOLUTION INTRAVENOUS at 08:10

## 2022-10-17 RX ADMIN — THIAMINE HYDROCHLORIDE 500 MG: 100 INJECTION, SOLUTION INTRAMUSCULAR; INTRAVENOUS at 05:10

## 2022-10-17 NOTE — PT/OT/SLP PROGRESS
Physical Therapy Screen and Discharge Summary      Patient Name:  Cheikh Yanez   MRN:  [id number]    Patient was screened and discharged from PT  on 10/16.  At that time patient was not following commands and demo'd no understanding of education provided.  Therapy was reordered but when pt is still cognitively impaired and not appropriate for therapy.  Palliative consult today.  Pt was made DNR and hospice consult was placed.  Will D/C pt.

## 2022-10-17 NOTE — ASSESSMENT & PLAN NOTE
Heart rate controlled  Continuous cardiac monitoring  [id number]  Pt was on eliquis 5 mg bid, failed swallow study.  Start low intensity heparin drip.  Cards eval appreciated: continue IV Metoprolol for now; transition back to oral Metoprolol when able to tolerate po meds    Results for orders placed during the hospital encounter of 10/11/22    Echo    Interpretation Summary  · The left ventricle is normal in size with concentric remodeling and normal systolic function.  · The estimated ejection fraction is 55%.  · A diastolic pattern consistent with atrial fibrillation observed.  · Mild-to-moderate mitral regurgitation.  · There is mild aortic valve stenosis.  · Aortic valve area is 1.36 cm2; peak velocity is 2.16 m/s; mean gradient is 11 mmHg.  · Elevated central venous pressure (15 mmHg).  · The estimated PA systolic pressure is 45 mmHg.  · Mild tricuspid regurgitation.  · Normal right ventricular size with normal right ventricular systolic function.  · There is pulmonary hypertension.

## 2022-10-17 NOTE — PROGRESS NOTES
"[facility]           Pharmacy  Admission Medication Reconciliation     The home medication history was taken by Johnathan Rebolledo PharmD.      Medication history obtained from Medications listed below were obtained from: Patient/family    Based on information gathered for medication list, you may go to "Admission" then "Reconcile Home Medications" tabs to review and/or act upon those items.     The home medication list has been updated by the Pharmacy department.   Please read ALL comments highlighted in yellow.   Please address this information as you see fit.    Feel free to contact us if you have any questions or require assistance.    The current inpatient medication list has been compared to the home medication list and the following discrepancies were noted:    Patient reports he/she IS TAKING the following which was not ordered upon admit  Apixaban 5mg twice daily  Atorvastatin 80mg daily  Duloxetine 40mg daily  Finasteride 5mg daily  Lisinopril 40mg daily  Spironolactone 25mg daily  Thiamine 100mg daily  Trazodone 50mg nightly      Please address this information as you see fit.  Feel free to contact us if you have any questions or require assistance.    Johnathan Rebolledo PharmD  [phone]      "

## 2022-10-17 NOTE — SUBJECTIVE & OBJECTIVE
Interval History: No acute events overnight.  Pt seen by SLP again today, pt remains NPO.    No past medical history on file.    No past surgical history on file.    Review of patient's allergies indicates:  No Known Allergies    Medications:  Continuous Infusions:   dextrose 5 % 75 mL/hr at 10/16/22 1718    heparin (porcine) in D5W 9 Units/kg/hr (10/16/22 2006)     Scheduled Meds:   ceFEPime (MAXIPIME) IVPB  1 g Intravenous Q12H    glycopyrrolate (PF)  0.1 mg Intravenous Q4H    metoprolol  5 mg Intravenous Q6H    thiamine (VITAMIN B1) IVPB  500 mg Intravenous TID     PRN Meds:albuterol-ipratropium, heparin (PORCINE), heparin (PORCINE), OLANZapine, sodium chloride 3%    Family History    None       Tobacco Use    Smoking status: Not on file    Smokeless tobacco: Not on file   Substance and Sexual Activity    Alcohol use: Not on file    Drug use: Not on file    Sexual activity: Not on file       Review of Systems   Unable to perform ROS: Mental status change   Objective:     Vital Signs (Most Recent):  Temp: 97.4 °F (36.3 °C) (10/17/22 1156)  Pulse: 74 (10/17/22 1241)  Resp: 18 (10/17/22 1156)  BP: 94/70 (10/17/22 1156)  SpO2: 97 % (10/17/22 1156) Vital Signs (24h Range):  Temp:  [96.6 °F (35.9 °C)-98 °F (36.7 °C)] 97.4 °F (36.3 °C)  Pulse:  [62-95] 74  Resp:  [14-21] 18  SpO2:  [97 %-100 %] 97 %  BP: ()/(55-98) 94/70     Weight: 66.2 kg (145 lb 15.1 oz)  Body mass index is 22.86 kg/m².    Physical Exam  Vitals and nursing note reviewed.   Constitutional:       Appearance: He is ill-appearing.   HENT:      Head: Normocephalic.      Mouth/Throat:      Mouth: Mucous membranes are dry.   Cardiovascular:      Rate and Rhythm: Rhythm irregular.   Pulmonary:      Effort: Pulmonary effort is normal.      Breath sounds: Examination of the right-upper field reveals rhonchi. Examination of the left-upper field reveals rhonchi. Examination of the right-middle field reveals rhonchi. Examination of the left-middle field  reveals rhonchi. Examination of the right-lower field reveals rhonchi. Examination of the left-lower field reveals rhonchi. Rhonchi present.      Comments: Frequent wet cough   Abdominal:      General: Abdomen is flat. Bowel sounds are normal.      Palpations: Abdomen is soft.   Skin:     General: Skin is dry.      Capillary Refill: Capillary refill takes less than 2 seconds.      Coloration: Skin is pale.   Neurological:      Mental Status: He is easily aroused.      Comments: Most responses were unintelligible and garbled.    Psychiatric:         Speech: Speech is slurred.         Behavior: Behavior is slowed. Behavior is cooperative.       Review of Symptoms      Symptom Assessment (ESAS 0-10 Scale)  Pain:  0  Dyspnea:  0  Anxiety:  0  Nausea:  0  Depression:  0  Anorexia:  0  Fatigue:  0  Insomnia:  0  Restlessness:  0  Agitation:  0  Unable to complete assessment due to Mental status change         Pain Assessment in Advanced Demential Scale (PAINAD)   Breathing - Independent of vocalization:  0  Negative vocalization:  0  Facial expression:  0  Body language:  0  Consolability:  0  Total:  0    Performance Status:  30    Living Arrangements:  Lives in nursing home    Psychosocial/Cultural: Pt came from [facility], prior to that, pt lived in a double home with a caretaker who lived next door.     Spiritual:  F - Camille and Belief:  Unknown   I - Importance:  Unknown      Time-Based Charting:  Yes  Chart Review: 21 minutes  Face to Face: 14 minutes  Symptom Assessment: 17 minutes  Coordination of Care: 15 minutes  Discharge Planning: 10 minutes  Advance Care Plannin minutes  Goals of Care: 8 minutes    Total Time Spent: 93 minutes      Advance Care Planning   Advance Directives:   Living Will: Yes        Copy on chart: Yes    Do Not Resuscitate Status: Yes    Medical Power of : Yes    Agent's Name:  Jc Melgar    Decision Making:  Patient unable to communicate due to disease  severity/cognitive impairment  Goals of Care: The healthcare power of   endorses that what is most important right now is to focus on avoiding the hospital, quality of life, even if it means sacrificing a little time, and comfort and QOL     Accordingly, we have decided that the best plan to meet the patient's goals includes enrolling in hospice care       Significant Labs: All pertinent labs within the past 24 hours have been reviewed.  CBC:   Recent Labs   Lab 10/17/22  0424   WBC 12.36   HGB 11.0*   HCT 35.7*   *        BMP:  Recent Labs   Lab 10/17/22  0424   *      K 3.8   *   CO2 21*   BUN 11   CREATININE 1.0   CALCIUM 9.5   MG 1.8     LFT:  Lab Results   Component Value Date    AST 17 10/14/2022    ALKPHOS 94 10/14/2022    BILITOT 0.5 10/14/2022     Albumin:   Albumin   Date Value Ref Range Status   10/17/2022 2.6 (L) 3.5 - 5.2 g/dL Final     Protein:   Total Protein   Date Value Ref Range Status   10/14/2022 5.3 (L) 6.0 - 8.4 g/dL Final     Lactic acid:   Lab Results   Component Value Date    LACTATE 1.0 10/11/2022       Significant Imaging: I have reviewed all pertinent imaging results/findings within the past 24 hours.

## 2022-10-17 NOTE — PLAN OF CARE
"   10/17/22 1036   Discharge Reassessment   Assessment Type Discharge Planning Reassessment   Did the patient's condition or plan change since previous assessment? Yes   Discharge Plan A Return to nursing home   DME Needed Upon Discharge  none   Discharge Barriers Identified Mental illness   Why the patient remains in the hospital Requires continued medical care   Post-Acute Status   Post-Acute Authorization Placement   Post-Acute Placement Status Pending medical clearance/testing  (Discuss[facility] with intake at [facility] Pt is longterm nursing home resident. [facility] and [facility] if needed.)     No future appointments.    BP (!) 147/98   Pulse 81   Temp 98 °F (36.7 °C) (Axillary)   Resp 16   Ht 5' 7" (1.702 m)   Wt 66.2 kg (145 lb 15.1 oz)   SpO2 98%   BMI 22.86 kg/m²      ceFEPime (MAXIPIME) IVPB  1 g Intravenous Q12H    metoprolol  5 mg Intravenous Q6H       "

## 2022-10-17 NOTE — ASSESSMENT & PLAN NOTE
- SBP 90s-120s overnight  - on Toprol XL, Lisinopril and Aldactone as an outpatient  - continue IV BB only for now; resume oral meds as BP tolerates and once able to take po meds/fluids

## 2022-10-17 NOTE — SUBJECTIVE & OBJECTIVE
No past medical history on file.    No past surgical history on file.    Review of patient's allergies indicates:  No Known Allergies    No current facility-administered medications on file prior to encounter.     Current Outpatient Medications on File Prior to Encounter   Medication Sig    acetaminophen (TYLENOL) 650 MG TbSR Take 650 mg by mouth 4 (four) times daily as needed.    aluminum-magnesium hydroxide-simethicone (MAALOX) 200-200-20 mg/5 mL Susp Take 30 mLs by mouth every 4 (four) hours as needed (GI upset).    apixaban (ELIQUIS) 5 mg Tab Take 5 mg by mouth 2 (two) times daily.    atorvastatin (LIPITOR) 80 MG tablet Take 80 mg by mouth once daily.    DULoxetine (CYMBALTA) 20 MG capsule Take 40 mg by mouth once daily.    finasteride (PROSCAR) 5 mg tablet Take 5 mg by mouth once daily.    lisinopriL (PRINIVIL,ZESTRIL) 40 MG tablet Take 40 mg by mouth once daily.    magnesium hydroxide 400 mg/5 ml (MILK OF MAGNESIA) 400 mg/5 mL Susp Take 30 mLs by mouth daily as needed (constipation).    melatonin 5 mg TbDL Take 15 mg by mouth every evening.    metoprolol succinate (TOPROL-XL) 25 MG 24 hr tablet Take 25 mg by mouth once daily.    multivitamin (THERAGRAN) per tablet Take 1 tablet by mouth once daily.    nitroGLYCERIN (NITROSTAT) 0.4 MG SL tablet Place 0.4 mg under the tongue every 5 (five) minutes as needed for Chest pain.    spironolactone (ALDACTONE) 25 MG tablet Take 25 mg by mouth once daily.    thiamine 100 MG tablet Take 100 mg by mouth once daily.    traZODone (DESYREL) 50 MG tablet Take 25 mg by mouth every evening.     Family History    None       Tobacco Use    Smoking status: Not on file    Smokeless tobacco: Not on file   Substance and Sexual Activity    Alcohol use: Not on file    Drug use: Not on file    Sexual activity: Not on file     Review of Systems   Unable to perform ROS: Other   Objective:     Vital Signs (Most Recent):  Temp: 98 °F (36.7 °C) (10/17/22 0815)  Pulse: 81 (10/17/22  0815)  Resp: 16 (10/17/22 0817)  BP: (!) 147/98 (10/17/22 0815)  SpO2: 98 % (10/17/22 0817)   Vital Signs (24h Range):  Temp:  [96.6 °F (35.9 °C)-98 °F (36.7 °C)] 98 °F (36.7 °C)  Pulse:  [62-95] 81  Resp:  [14-21] 16  SpO2:  [96 %-100 %] 98 %  BP: ()/(55-98) 147/98     Weight: 66.2 kg (145 lb 15.1 oz)  Body mass index is 22.86 kg/m².    SpO2: 98 %  O2 Device (Oxygen Therapy): room air      Intake/Output Summary (Last 24 hours) at 10/17/2022 0946  Last data filed at 10/17/2022 0635  Gross per 24 hour   Intake 0 ml   Output 1900 ml   Net -1900 ml       Lines/Drains/Airways       Drain  Duration             Male External Urinary Catheter 10/13/22 0830 Small 4 days              Peripheral Intravenous Line  Duration                  Peripheral IV - Single Lumen 10/13/22 0930 Anterior;Left Forearm 4 days         Peripheral IV - Single Lumen 10/15/22 1000 22 G Left;Posterior Hand 1 day         Peripheral IV - Single Lumen 10/15/22 1359 22 G Anterior;Proximal;Right Upper Arm 1 day                    Physical Exam  Cardiovascular:      Rate and Rhythm: Normal rate. Rhythm irregularly irregular.   Pulmonary:      Effort: Pulmonary effort is normal.   Neurological:      Mental Status: He is alert.      Comments: Mumbled speech with inability to assess orientation        Significant Labs: BMP:   Recent Labs   Lab 10/16/22  0348 10/17/22  0424   * 126*    140   K 4.1 3.8   * 113*   CO2 18* 21*   BUN 12 11   CREATININE 1.0 1.0   CALCIUM 9.1 9.5   MG 1.9 1.8   , CBC   Recent Labs   Lab 10/16/22  0348 10/17/22  0424   WBC 14.48* 12.36   HGB 10.9* 11.0*   HCT 35.2* 35.7*    297     Significant Imaging: Echocardiogram: Transthoracic echo (TTE) complete (Cupid Only):   Results for orders placed or performed during the hospital encounter of 10/11/22   Echo   Result Value Ref Range    BSA 1.76 m2    LA WIDTH 3.60 cm    Left Ventricular Outflow Tract Mean Velocity 0.61 cm/s    Left Ventricular Outflow  Tract Mean Gradient 1.59 mmHg    LVIDd 4.26 3.5 - 6.0 cm    IVS 0.66 0.6 - 1.1 cm    Posterior Wall 0.91 0.6 - 1.1 cm    LVIDs 2.75 2.1 - 4.0 cm    FS 35 28 - 44 %    LA volume 61.48 cm3    STJ 2.90 cm    LV mass 101.14 g    LA size 3.17 cm    RVDD 2.79 cm    RV S' 0.01 cm/s    Left Ventricle Relative Wall Thickness 0.43 cm    AV mean gradient 11 mmHg    AV valve area 1.36 cm2    AV Velocity Ratio 0.37     AV index (prosthetic) 0.41     MV mean gradient 2 mmHg    MV valve area by continuity eq 1.25 cm2    IVRT 125.59 msec    Pulm vein S/D ratio 1.06     LVOT diameter 2.06 cm    LVOT area 3.3 cm2    LVOT peak dean 0.79 m/s    LVOT peak VTI 14.20 cm    Ao peak dean 2.16 m/s    Ao VTI 34.7 cm    LVOT stroke volume 47.30 cm3    AV peak gradient 19 mmHg    MV peak gradient 6 mmHg    TR Max Dean 2.72 m/s    MV VTI 37.7 cm    PV Peak S Dean 0.33 m/s    PV Peak D Dean 0.31 m/s    LV Systolic Volume 28.17 mL    LV Systolic Volume Index 16.0 mL/m2    LV Diastolic Volume 81.04 mL    LV Diastolic Volume Index 46.05 mL/m2    LA Volume Index 34.9 mL/m2    LV Mass Index 57 g/m2    RA Major Axis 6.05 cm    Left Atrium Minor Axis 6.63 cm    Left Atrium Major Axis 6.07 cm    Triscuspid Valve Regurgitation Peak Gradient 30 mmHg    LA Volume Index (Mod) 36.7 mL/m2    LA volume (mod) 64.61 cm3    RA Width 3.70 cm    Right Atrial Pressure (from IVC) 15 mmHg    EF 55 %    TV rest pulmonary artery pressure 45 mmHg    Narrative    · The left ventricle is normal in size with concentric remodeling and   normal systolic function.  · The estimated ejection fraction is 55%.  · A diastolic pattern consistent with atrial fibrillation observed.  · Mild-to-moderate mitral regurgitation.  · There is mild aortic valve stenosis.  · Aortic valve area is 1.36 cm2; peak velocity is 2.16 m/s; mean gradient   is 11 mmHg.  · Elevated central venous pressure (15 mmHg).  · The estimated PA systolic pressure is 45 mmHg.  · Mild tricuspid regurgitation.  · Normal  right ventricular size with normal right ventricular systolic   function.  · There is pulmonary hypertension.

## 2022-10-17 NOTE — ASSESSMENT & PLAN NOTE
- uncertain of details; followed by Dr. Hernandez  - on statin and BB as an outpatient; no ASA likely due to Eliquis use  - troponin minimally elevated at .08-.10 felt to be related to demand etiology

## 2022-10-17 NOTE — ASSESSMENT & PLAN NOTE
Facility medlist: atorvastatin 80 mg qhs, finasteride 5 mg qhs, lisinopril 40 mg daily, melatonin 15 mg qhs, metoprolol ER 25 mg daily, MVI daily, spironolactone 25 mg qhs, thiamine 100 mg daily, duloxetine 40 mg daily, eliquis 5 mg bid daily, trazodone 25 mg daily, as needed mag hydroxide, as need maalox, as need tylenol 325 mg q 4 prn, as needed nitro  -failed swallow study, oral meds on hold  -heparin drip initiated     Abdomen soft, non-tender, no guarding.

## 2022-10-17 NOTE — ASSESSMENT & PLAN NOTE
Consulting palliative care  Advance Care Planning     Date: 10/17/2022    Code Status  In light of the patients advanced and life limiting illness,I engaged the the healthcare power of   in a conversation about the patient's preferences for care  at the very end of life. Per POA patient wishes to have a natural, peaceful death.  Along those lines, the patient does not wish to have CPR or other invasive treatments performed when his heart and/or breathing stops. I communicated to the healthcare power of   that a DNR order would be placed in his medical record to reflect this preference.  I spent a total of 15 minutes engaging the patient in this advance care planning discussion.

## 2022-10-17 NOTE — ASSESSMENT & PLAN NOTE
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 Encephalopathy secondary to infectious/ metabolic deficits.   -Neuro recs PT/OT/ST.  Pt not appropriate for PT/OT until today 10/17.

## 2022-10-17 NOTE — PT/OT/SLP PROGRESS
Occupational Therapy      Patient Name:  Cheikh Yanez   MRN:  [id number]    Patient not seen today secondary to OT eval was completed 10/16/2022 and determined not appropriate for OT services. Please refer to OT eval in chart. [facility]ston discontinue OT orders.    10/17/2022

## 2022-10-17 NOTE — ASSESSMENT & PLAN NOTE
"At time of initial consult, pt sitting up in bed.  Pt able to follow simple commands.  When asked where he was the pt responded "[employer]."  All other verbalizations  were muddled and unintelligible. Pt demonstrates wet cough throughout interview.  Pt with difficulty clearing secretions.  Glycopyrrolate iv ordered.    Reached out to the pts SALMAGDALENE Melgar for collateral information.  [facility] recent drastic decline in the pts over all functional status both mentally and physically over the past few months.  Pt was placed in [facility] for a few days, sent to [facility] due to concerns and then placed at [facility].  [facility] is in agreement for pt to return to [facility] when cleared.  We discussed goals of care.  [facility] endorses that pt had expressed that he did not want to artificially prolong his life with invasive measures in the past.  We discussed the pts multiple failed swallow studies recently.  [facility] stated that the pt would not want a feeding tube of any kind.  I introduced the topic of hospice by name. [facility] is very familiar with hospice.  We discussed what hospice could offer the pt.  [facility] agrees that enrolling the pt in hospice upon discharge to NH aligns with the pts goals.  MD and [place] made aware.   "

## 2022-10-17 NOTE — PROGRESS NOTES
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 Patient unable to provide any history. Per RN, patient normally oriented to self and now is somnolent.  ED findings:  WBCs 16.91, potassium 5.2, creatinine 2.2, troponin 0.120, EKG without ischemic changes, u/a concerning for infectious process,  chest x-ray showed a right basilar airspace opacity, covid pending, blood cultures pending.  Patient admitted to hospital medicine observation unit for further medical management.     Facility Medlist:  atorvastatin 80 mg qhs, finasteride 5 mg qhs, lisinopril 40 mg daily, melatonin 15 mg qhs, metoprolol ER 25 mg daily, MVI daily, spironolactone 25 mg qhs, thiamine 100 mg daily, duloxetine 40 mg daily, eliquis 5 mg bid daily, trazodone 25 mg daily, as needed mag hydroxide, as need maalox, as need tylenol 325 mg q 4 prn, as needed nitro      Overview/Hospital Course:  Pt admitted for AMS, pt sent from [facility].  PEC in place.  UA notable for infection, pt started on ceftriaxone.  Psychiatry consulted, PEC discontinued.  Noted baseline by caregiver described as "vegetative state" and at baseline oriented to person and situation.  It was recommended to hold lithium and haldol with ALICIA.  Depacon recommended however nationwide supply chain issue and no reasonable alternative.  Zyprexa prn agitation.  Folate/Thiamine/MVI.  RD consult as pt failed swallow assessment.  Neurology consult " for encephalopathy.  10/13 UC with multiple organisms none in predominance, micro lab will review plate again 10/13.  UC with proteus vulgaris resistant to ceftriaxone and changed to cefepime on 10/15.  Neurology consulted and suspect metabolic encephalopathy.  Nephrology agrees with D5 for treatment of hypernatremia with noted improvement.  ST consulted and recommended NPO.  Cardiology consulted for abnormal rhythm, telemetry reviewed and paced rhythm with underlying afib rate controlled with PVCs and bigeminy; no evidence of afib with RVR or VTach.  Continue IV Metoprolol for now with transition to oral BB at home dose when able to take po meds/fluids.  Palliative Care consulted and pt changed to DNR.      Interval History:  no acute events overnight.  Pt saying some sentences this morning, still mumbling but improved from over the weekend.  Pt more alert this morning.  Baseline is oriented to person and situation, pt is not back to baseline.    Review of Systems   Unable to perform ROS: Mental status change   Objective:     Vital Signs (Most Recent):  Temp: 98 °F (36.7 °C) (10/17/22 0815)  Pulse: 81 (10/17/22 0815)  Resp: 16 (10/17/22 0817)  BP: (!) 147/98 (10/17/22 0815)  SpO2: 98 % (10/17/22 0817)   Vital Signs (24h Range):  Temp:  [96.6 °F (35.9 °C)-98 °F (36.7 °C)] 98 °F (36.7 °C)  Pulse:  [62-95] 81  Resp:  [14-21] 16  SpO2:  [96 %-100 %] 98 %  BP: ()/(55-98) 147/98     Weight: 66.2 kg (145 lb 15.1 oz)  Body mass index is 22.86 kg/m².    Intake/Output Summary (Last 24 hours) at 10/17/2022 1049  Last data filed at 10/17/2022 0635  Gross per 24 hour   Intake 0 ml   Output 1900 ml   Net -1900 ml      Physical Exam  HENT:      Head: Normocephalic and atraumatic.   Cardiovascular:      Rate and Rhythm: Normal rate. Rhythm regularly irregular.      Pulses: Normal pulses.   Pulmonary:      Effort: No respiratory distress.      Breath sounds: No wheezing.   Abdominal:      General: Bowel sounds are normal.      " Palpations: Abdomen is soft.   Musculoskeletal:         General: No deformity.      Cervical back: Neck supple.   Skin:     General: Skin is warm and dry.   Neurological:      Mental Status: He is alert. He is disoriented.      GCS: GCS eye subscore is 4. GCS verbal subscore is 4. GCS motor subscore is 5.      Comments: At baseline described as "vegetative state" oriented to person and situation.  Pt close to baseline.       Significant Labs: All pertinent labs within the past 24 hours have been reviewed.    Significant Imaging: I have reviewed all pertinent imaging results/findings within the past 24 hours.      Assessment/Plan:      * Encephalopathy due to infection  HCAP (healthcare-associated pneumonia)  UTI (urinary tract infection), Hematuria  Leukocytosis    Continuous Cardiac monitoring  Trend troponin, trended down  Oxygen prn  duonebs prn  IVFs   Pt was started on macrobid outpatient on 10/11.  Continued IV ceftriaxone and azithro  -UC multiple organisms in predominance, called micro lab and spoke with Yo Huff, will look at plate again.  Update 10/14 gram negative rods and proteus, continue ceftriaxone and azithro.  10/15 Proteus vulgaris resistant to ceftriaxone, changed to cefepime with improvement in WBC and improvement in mental status on 10/17.  -US retroperitoneal: Probable blood products in the urinary bladder.  A mass is not entirely excluded, follow-up is recommended. No hydronephrosis.   BC NGTD  Psychiatry following and recommended Neurology consult, will place.  neurochecks   -failed swallow study x 2, ST seeing again 10/17  -Vit B12 722 appropriate, Vit D deficiency see below  -Palliative Care consulted    Progressive supranuclear palsy  Mri brain with atrophy of midbrain with relative preservation of zoie suggesting Progressive Supranuclear palsy diagnosis. Encephalopathy secondary to infectious/ metabolic deficits.   -Neuro recs PT/OT/ST.  Pt not appropriate for PT/OT until today " 10/17.      Hypernatremia  Hypomagnesemia  Hypokalemia  Hypophosphatemia  Suspect hypovolemic hypernatremia however pt does have ALICIA and history of lithium use.  Will consult nephrology, possible diabetes insipidus  Lithium level 1.5 on 10/6/22, elevated  -Nephrology recs appreciated, agree with D5.  No NS.  Improved.  -Replacing electrolytes IV    Cardiac pacemaker in situ  Single chamber pacemaker 2020 due to syncope  Cards recs appreciated:  -paced rhythm with underlying afib rate controlled with PVCs and bigeminy; no evidence of afib with RVR or VTach   - monitor on telemetry while admitted  - continue IV Metoprolol for now with transition to oral BB at home dose when able to take po meds/fluids    Congestive heart failure  Appears euvolemic  Continuous cardiac monitoring  Trend troponins, trended down  Facility medlist: atorvastatin 80 mg qhs, lisinopril 40 mg daily, metoprolol succinated ER 25 mg daily, spironolactone 25 mg daily    Results for orders placed during the hospital encounter of 10/11/22    Echo    Interpretation Summary  · The left ventricle is normal in size with concentric remodeling and normal systolic function.  · The estimated ejection fraction is 55%.  · A diastolic pattern consistent with atrial fibrillation observed.  · Mild-to-moderate mitral regurgitation.  · There is mild aortic valve stenosis.  · Aortic valve area is 1.36 cm2; peak velocity is 2.16 m/s; mean gradient is 11 mmHg.  · Elevated central venous pressure (15 mmHg).  · The estimated PA systolic pressure is 45 mmHg.  · Mild tricuspid regurgitation.  · Normal right ventricular size with normal right ventricular systolic function.  · There is pulmonary hypertension.      CAD (coronary artery disease)  See above, resume statin once passes swallow assessment      History of CVA (cerebrovascular accident)  Facility medlist: atorvastatin 80 mg qhs, finasteride 5 mg qhs, lisinopril 40 mg daily, melatonin 15 mg qhs, metoprolol ER 25 mg  daily, MVI daily, spironolactone 25 mg qhs, thiamine 100 mg daily, duloxetine 40 mg daily, eliquis 5 mg bid daily, trazodone 25 mg daily, as needed mag hydroxide, as need maalox, as need tylenol 325 mg q 4 prn, as needed nitro  -failed swallow study, oral meds on hold  -heparin drip initiated      A-fib  Heart rate controlled  Continuous cardiac monitoring  [id number]  Pt was on eliquis 5 mg bid, failed swallow study.  Start low intensity heparin drip.  Cards bruna appreciated: continue IV Metoprolol for now; transition back to oral Metoprolol when able to tolerate po meds    Results for orders placed during the hospital encounter of 10/11/22    Echo    Interpretation Summary  · The left ventricle is normal in size with concentric remodeling and normal systolic function.  · The estimated ejection fraction is 55%.  · A diastolic pattern consistent with atrial fibrillation observed.  · Mild-to-moderate mitral regurgitation.  · There is mild aortic valve stenosis.  · Aortic valve area is 1.36 cm2; peak velocity is 2.16 m/s; mean gradient is 11 mmHg.  · Elevated central venous pressure (15 mmHg).  · The estimated PA systolic pressure is 45 mmHg.  · Mild tricuspid regurgitation.  · Normal right ventricular size with normal right ventricular systolic function.  · There is pulmonary hypertension.          Goals of care, counseling/discussion  Consulting palliative care  Advance Care Planning     Date: 10/17/2022    Code Status  In light of the patients advanced and life limiting illness,I engaged the the healthcare power of   in a conversation about the patient's preferences for care  at the very end of life. Per POA patient wishes to have a natural, peaceful death.  Along those lines, the patient does not wish to have CPR or other invasive treatments performed when his heart and/or breathing stops. I communicated to the healthcare power of   that a DNR order would be placed in his medical record to  reflect this preference.  I spent a total of 15 minutes engaging the patient in this advance care planning discussion.           Vitamin D deficiency  Vit D 14, replace orally if able to pass swallow eval      History of pulmonary embolism  No signs of respiratory distress  Monitor  Start low intensity heparin as failed swallow assessment, eliquis on hold      Hypertension  Stable, see medlist above      Suicidal ideation  Bipolar disorder    Transferred from [facility] due to medical issues  Stable  PEC on admission  Consult Psychiatry for continued management   Psych recommended d/c PEC      Elevated troponin  Continuous cardiac monitoring  ekg without ischemic changes   Troponin trended down        VTE Risk Mitigation (From admission, onward)         Ordered     heparin 25,000 units in dextrose 5% (100 units/ml) IV bolus from bag - ADDITIONAL PRN BOLUS - 60 units/kg  As needed (PRN)        Question:  Heparin Infusion Adjustment (DO NOT MODIFY ANSWER)  Answer:  \[email or web address]\epic\Images\Pharmacy\HeparinInfusions\heparin LOW INTENSITY nomogram for [facility] [id number]    10/13/22 1326     heparin 25,000 units in dextrose 5% (100 units/ml) IV bolus from bag - ADDITIONAL PRN BOLUS - 30 units/kg  As needed (PRN)        Question:  Heparin Infusion Adjustment (DO NOT MODIFY ANSWER)  Answer:  \\[email or web address]\epic\Images\Pharmacy\HeparinInfusions\heparin LOW INTENSITY nomogram for [facility] [id number]    10/13/22 1326     heparin 25,000 units in dextrose 5% 250 mL (100 units/mL) infusion LOW INTENSITY nomogram - [facility]  Continuous        Question Answer Comment   Heparin Infusion Adjustment (DO NOT MODIFY ANSWER) \\[email or web address]\epic\Images\Pharmacy\HeparinInfusions\heparin LOW INTENSITY nomogram for [facility] [id number].pdf    Begin at (in units/kg/hr) 12        10/13/22 1326                Discharge Planning   JAYSON: 10/17/2022     Code Status: DNR   Is the patient medically ready for discharge?:     Reason for patient still in hospital  (select all that apply): Patient trending condition, Consult recommendations and PT / OT recommendations  Discharge Plan A: Return to nursing home                  Miryam Cadet PA-C  Department of Hospital Medicine   [facility]

## 2022-10-17 NOTE — PT/OT/SLP PROGRESS
"Speech Language Pathology Treatment    Patient Name:  Cheikh Yanez   MRN:  [id number]  Admitting Diagnosis: Encephalopathy due to infection    Recommendations:                 General Recommendations: needs Palliative care consult          Diet recommendations:  NPO, Liquid Diet Level: NPO   Aspiration Precautions: oral care as tolerated    General Precautions: Standard, fall, aspiration, NPO, hearing impaired  Communication strategies:  limited    Subjective     PA asked SLP to re-eval patient this am.   Patient goals: "leave me alone."     Pain/Comfort:  Pain Rating 1:  (yells when touched even lightly)    Respiratory Status: Room air    Objective:     Has the patient been evaluated by SLP for swallowing?   Yes  Keep patient NPO? Yes   Current Respiratory Status:    room air     Swallowing: Pt seen in room, RN reported no overall change in status. Upon entry into room, pt is wet and sounds gurgly. RN reports she has been suctioning him on and off all AM. SLP did elevate HOB and offered him PO. Pt did not comment but noted to moan and yesukhwinder when repositioned in bed.   Pt with gurgly vocalizations when asked ?s. Pt required continued oral suctioning with limited clearance observed. Attempted 1/4 tsp of applesauce and pt made no attempt to actively swallow- SLP had to orally remove with suctioning. Oral suctioning with Yankauer does not appear to be sufficient at this time. Pt sounds like he has material sitting on his vocal cords- notified PA of no change in swallow status and request for deep suctioning order.   Agree with Palliative consult as pt making no progress. Pt presents with outward and clinical s/sx of airway aspiration at bedside.     Assessment:     Cheikh Yanez is a 81 y.o. male admitted with Encephalopathy due to infection who presents with an SLP diagnosis of audible s/sx of  Dysphagia with continued risk for airway aspiration. Impaired cognition and poor saliva management adversely impacts pt's ability " to safely take in an oral diet.       Goals:   Multidisciplinary Problems       SLP Goals          Problem: SLP    Goal Priority Disciplines Outcome   SLP Goal     SLP Ongoing, Not Progressing                       Plan:        Plan of Care expires:  11/10/22  Plan of Care reviewed with:  patient   SLP Follow-Up:  No       Discharge recommendations:  nursing facility, basic, psychiatric facility   Barriers to Discharge:  overall status     Time Tracking:     SLP Treatment Date:   10/17/22  Speech Start Time:  1058  Speech Stop Time:  1111     Speech Total Time (min):  13 min    Billable Minutes: Treatment Swallowing Dysfunction 13    10/17/2022

## 2022-10-17 NOTE — ASSESSMENT & PLAN NOTE
- single chamber pacemaker placed in 5/2020 due to syncope; followed by Dr. Hernandez  - reports of abnormal rhythm on telemetry; telemetry reviewed with Dr. Lake: paced rhythm with underlying afib rate controlled with PVCs and bigeminy; no evidence of afib with RVR or VTach   - monitor on telemetry while admitted  - continue IV Metoprolol for now with transition to oral BB at home dose when able to take po meds/fluids

## 2022-10-17 NOTE — ASSESSMENT & PLAN NOTE
HCAP (healthcare-associated pneumonia)  UTI (urinary tract infection), Hematuria  Leukocytosis    Continuous Cardiac monitoring  Trend troponin, trended down  Oxygen prn  duonebs prn  IVFs   Pt was started on macrobid outpatient on 10/11.  Continued IV ceftriaxone and azithro  -UC multiple organisms in predominance, called micro lab and spoke with Yo Huff, will look at plate again.  Update 10/14 gram negative rods and proteus, continue ceftriaxone and azithro.  10/15 Proteus vulgaris resistant to ceftriaxone, changed to cefepime with improvement in WBC and improvement in mental status on 10/17.  -US retroperitoneal: Probable blood products in the urinary bladder.  A mass is not entirely excluded, follow-up is recommended. No hydronephrosis.   BC NGTD  Psychiatry following and recommended Neurology consult, will place.  neurochecks   -failed swallow study x 2, ST seeing again 10/17  -Vit B12 722 appropriate, Vit D deficiency see below  -Palliative Care consulted

## 2022-10-17 NOTE — ASSESSMENT & PLAN NOTE
Single chamber pacemaker 2020 due to syncope  Cards recs appreciated:  -paced rhythm with underlying afib rate controlled with PVCs and bigeminy; no evidence of afib with RVR or VTach   - monitor on telemetry while admitted  - continue IV Metoprolol for now with transition to oral BB at home dose when able to take po meds/fluids

## 2022-10-17 NOTE — PROGRESS NOTES
"PSYCHIATRY INPATIENT PROGRESS NOTE  SUBSEQUENT HOSPITAL VISIT      10/17/2022 11:15 AM   Cheikh Yanez   1940   [id number]           DATE OF ADMISSION: 10/11/2022  8:28 PM    SITE: [facility]    CURRENT LEGAL STATUS: Non-Contested Admission Status      HISTORY     Per Initial History from Primary Team:   Chief Complaint:               Patient presents with    Altered Mental Status       Pt sent from [facility] for eval of AMS. Per Chani at [facility], pt sent for AMS, states that pt has been somnolent on today and had blood work that showed an ALICIA. Chani states that the doctor also noted abnormal lung sounds. Per Chani, pt baseline mentation is oriented to self and sometimes situation only. Pt currently a PEC for SI.     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 Patient unable to provide any history. Per RN, patient normally oriented to self and now is somnolent.  ED findings:  WBCs 16.91, potassium 5.2, creatinine 2.2, troponin 0.120, EKG without ischemic changes, u/a concerning for infectious process,  chest x-ray showed a right basilar airspace opacity, covid pending, blood cultures pending.  Patient admitted to hospital medicine observation unit for further medical management.   Overview/Hospital Course from Primary Team:  Pt admitted for AMS, pt sent from [facility].  PEC in place.  UA notable for infection, pt started on ceftriaxone.  Psychiatry consulted, PEC discontinued.  Noted baseline by caregiver described as "vegetative state" and at baseline oriented to person and situation.  It was recommended to hold lithium and haldol with ALICIA.  Depacon recommended however nationwide supply chain issue and no reasonable alternative.  Zyprexa prn agitation.  Folate/Thiamine/MVI.  RD consult as pt failed swallow " "assessment.  Neurology consult for encephalopathy.  10/13 UC with multiple organisms none in predominance, micro lab will review plate again 10/13.  UC with proteus vulgaris resistant to ceftriaxone and changed to cefepime on 10/15.  Neurology consulted and suspect metabolic encephalopathy.  Nephrology agrees with D5 for treatment of hypernatremia with noted improvement.  ST consulted and recommended NPO.  Cardiology consulted for abnormal rhythm, telemetry reviewed and paced rhythm with underlying afib rate controlled with PVCs and bigeminy; no evidence of afib with RVR or VTach.  Continue IV Metoprolol for now with transition to oral BB at home dose when able to take po meds/fluids.  Palliative Care consulted and pt changed to DNR.  Interval History from Primary Team on 10/17/2022:  no acute events overnight.  Pt saying some sentences this morning, still mumbling but improved from over the weekend.  Pt more alert this morning.  Baseline is oriented to person and situation, pt is not back to baseline.  Per Palliative Care Team on 10/17/2022:  Palliative care encounter  At time of initial consult, pt sitting up in bed.  Pt able to follow simple commands.  When asked where he was the pt responded "[place]."  All other verbalizations  were muddled and unintelligible. Pt demonstrates wet cough throughout interview.  Pt with difficulty clearing secretions.  Glycopyrrolate iv ordered.  Reached out to the pts REMIGIO Melgar for collateral information.  [facility] reports a recent drastic decline in the pts over all functional status both mentally and physically over the past few months.  Pt was placed in [facility] for a few days, sent to [facility] due to concerns and [facility]  [facility] is in agreement for pt to return to [facility] when cleared.  We discussed goals of care.  [facility] endorses that pt had expressed that he did not want to artificially prolong his life with invasive measures in the past.  We discussed " the pts multiple failed swallow studies recently.  REMIGIO stated that the pt would not want a feeding tube of any kind.  I introduced the topic of hospice by name. SALMAGDALENE is very familiar with hospice.  We discussed what hospice could offer the pt.  [facility] that enrolling the pt in hospice upon discharge to NH aligns with the pts goals.  MD and [place] made aware.    Per Neurology Team on 10/14/2022:  81 y.o. male with  history of heart failure , Afib on Eliquis , HTN , h/o PE , h/o stroke and Bipolar who presents from [facility]  for Encephalopathy. Clinical presentation significant for  worsening dysarthria, dysphagia , frontal cognitive abnormalities and increased falls.  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 Encephalopathy secondary to infectious/ metabolic deficits.   - Recommend correction of infectious/ metabolic derangements   - Would benefit form Speech and language therapy  to help manage dysphagia and dysarthrra  - Recommend Occupational therapy to eval and treat in performing ADL's  - Recommend Physical therapy to provide symptomatic treatment for postural instability and falls   No further intervention indicated at this time from Neurology Service. Please call if any further questions or any changes in neurologic condition.      Chief Complaint / Reason for Original Psychiatry Consult: AMS / PEC/CEC from [facility]        Subjective / Interval Psychiatric History Today (10/17/2022) (with Psychiatric ROS below):  Cheikh Yanez is a 81 y.o. 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  Psychiatry was originally consulted as noted above.  The patient was seen and examined  "again this morning.  The chart was reviewed again this morning.  PRN Zyprexa was needed twice on 10/16/2022 per chart review for non-redirectable behavioral disturbances in context of AMS (none needed so far on 10/17/2022).  On examination today, the patient was somnolent and again remains only oriented to self.  He was again disoriented to name of place, type of place, city, state, month, year, and situation.  He was again CAM-ICU positive for delirium.  He was slightly more vocal today when compared to last week, but he remains with mostly llogical / nonsensical mumbling.  He told the palliative care team that he was in "[place]."  He continues to appear confused.  He was again able to open his eyes and make eye contact with repeated verbal command.  He again denied any current medical/physical complaints, but the validity of this remains notably limited due to the patient's AMS / Delirium / Dementia (intermittent cough noted on exam ; pt with trouble clearing secretions per ST & primary team).  NAD was observed during the examination.  Despite multiple attempts, the comprehensive psychiatric assessment was again notably limited due to the patient's AMS / Delirium / Dementia.  Psychotherapy was again implemented with a focus on improving orientation, confusion, mood / irritability, and behavior.  See detailed psych ROS below. See collateral info below.  See A/P below.       Collateral:   I spoke to the patient's main caregiver (at [phone]).  He states that they live in a double shotgun-style home in the [place], [place].  He states that the patient has a psychiatric hx of Bipolar I Disorder and Dementia.  He states that even at baseline, the patient is often only oriented to person and situation.  He endorses that the patient has had a gradual decline in neurocognition over the past year.  He states that the patient has historically taken Lithium 300 mg PO BID for Bipolar Disorder.  He is " "unaware of the name of the patient's psychiatrist.  He has been struggling with delirium on top of underlying dementia since a hospital admission at [facility] in August 2022.    Per CM on 10/12/22:  Pt lying in bed with sitter at bedside, disoriented, confused, mumbling unintelligible words, unable to participate in assessment.  Contacted LORI Jc Melgar [phone](lives in [place], [place]).  Mr. Melgar shares POA with  Benito Jaquez [phone]  who lives 1 1/2 block from pt.  States pt lives with David Lopez [phone] who has been his tenant and caretaker for past 6 months.  POA states in the past 2 yrs, friends have noticed deterioration in health.  Smokes 2pk cigarettes per day and $300 monthly marijuana habit.  Pt has a hx of Bipolar disorder that usually take lithium for.  David oversees medication administration daily, pt receives individually wrapped from mail order pharmacy.  Mr Alvarez states the over the past few months, pt "lost the ability to walk, run and lives in an almost vegetative state."  He reports pt has fallen a few times in last few weeks prompting to admissions at [facility].  He was transferred to an unknown "rehab" where he was subsequently sent to [facility] for inpt psych treatment.    Pt will remain PEC'd, psychiatry following.  At time of d/c, pt will have support from [facility] and Mr David Lopez should plan be to return home.  Should pt continue to require inpatient psychiatric care, team can place the d/c order with facility transfer orders for central psych placement either at [facility] return vs placement by centralized team.   Per Palliative Care Team on 10/17/22:  Reached out to the pts MPOMAGDALENE Melgar for collateral information.  [facility] reports a recent drastic decline in the pts over all functional status both mentally and physically over the past few months.  Pt was placed in [facility] for a few days, sent to [facility] due to concerns and then [facility].  REMIGIO is in agreement for pt to return to [facility] when cleared.  We discussed goals of care.  REMIGIO endorses that pt had expressed that he did not want to artificially prolong his life with invasive measures in the past.  We discussed the pts multiple failed swallow studies recently.  REMIGIO stated that the pt would not want a feeding tube of any kind.  I introduced the topic of hospice by name. REMIGIO is very familiar with hospice.  We discussed what hospice could offer the pt.  REMIGIO agrees that enrolling the pt in hospice upon discharge to NH aligns with the pts goals.  MD and CM made aware.          Psychiatric Review Of Systems - Currently, the patient is endorsing and/or denying the following:  Attempted but unable to assess due to the patient's AMS / Delirium / Dementia        PSYCHOTHERAPY ADD-ON +[id number]   30 (16-37*) minutes     Time: 18 minutes  Participants: Met with patient     Therapeutic Intervention Type: behavior modifying psychotherapy, supportive psychotherapy  Why chosen therapy is appropriate versus another modality: relevant to diagnosis, patient responds to this modality, evidence based practice     Target symptoms: disorientation, confusion, mood / irritability, and behavioral disturbances   Primary focus: improving orientation, confusion, mood / irritability, and behavior  Psychotherapeutic techniques: supportive and psychodynamic techniques; psycho-education; reorientation ; behavioral modification ; reality / insight orientation ; problem solving techniques and managing life/medical stressors     Outcome monitoring methods: self-report, observation     Patient's response to intervention:  The patient's response to intervention is impaired / limited.     Progress toward goals:  The patient's progress toward goals is impaired / limited.          ROS (limited validity due to the patient's AMS / Delirium / Dementia):  General ROS: negative for - chills, fever or night sweats;  positive for fatigue  Ophthalmic ROS: negative for - blurry vision, double vision or eye pain  ENT ROS: negative for - sinus pain, headaches, sore throat or visual changes  Allergy and Immunology ROS: negative for - hives, itchy/watery eyes or nasal congestion  Hematological and Lymphatic ROS: negative for - bleeding problems, bruising, jaundice or pallor  Endocrine ROS: negative for - galactorrhea, hot flashes, mood swings, palpitations or temperature intolerance  Respiratory ROS: negative for - cough, hemoptysis, shortness of breath, tachypnea or wheezing  Cardiovascular ROS: negative for - chest pain, dyspnea on exertion, loss of consciousness, palpitations, rapid heart rate or shortness of breath  Gastrointestinal ROS: negative for - appetite loss, nausea, abdominal pain, blood in stools, change in bowel habits, constipation or diarrhea  Genito-Urinary ROS: negative for - incontinence, nocturia or pelvic pain  Musculoskeletal ROS: negative for - joint stiffness, joint swelling, joint pain or muscle pain   Neurological ROS: negative for - dizziness, numbness/tingling or seizures; positive for behavioral changes, confusion, & memory loss  Dermatological ROS: negative for dry skin, hair changes, pruritus or rash  Psychiatric ROS: see detailed psychiatric ROS above in subjective section       PAST MEDICAL & SURGICAL HISTORY   No past medical history on file.  No past surgical history on file.    NEUROLOGIC HISTORY  Seizures: attempted but unable to assess due to patient's AMS / Delirium / Dementia  Head trauma: attempted but unable to assess due to patient's AMS / Delirium / Dementia  CVA: yes     FAMILY HISTORY   No family history on file.    ALLERGIES   Review of patient's allergies indicates:  No Known Allergies    CURRENT MEDICATION REGIMEN   Home Meds:   Prior to Admission medications    Medication Sig Start Date End Date Taking? Authorizing Provider   acetaminophen (TYLENOL) 650 MG TbSR Take 650 mg by mouth 4  (four) times daily as needed.   Yes Historical Provider   aluminum-magnesium hydroxide-simethicone (MAALOX) 200-200-20 mg/5 mL Susp Take 30 mLs by mouth every 4 (four) hours as needed (GI upset).   Yes Historical Provider   apixaban (ELIQUIS) 5 mg Tab Take 5 mg by mouth 2 (two) times daily.   Yes Historical Provider   atorvastatin (LIPITOR) 80 MG tablet Take 80 mg by mouth once daily.   Yes Historical Provider   DULoxetine (CYMBALTA) 20 MG capsule Take 40 mg by mouth once daily.   Yes Historical Provider   finasteride (PROSCAR) 5 mg tablet Take 5 mg by mouth once daily.   Yes Historical Provider   lisinopriL (PRINIVIL,ZESTRIL) 40 MG tablet Take 40 mg by mouth once daily.   Yes Historical Provider   magnesium hydroxide 400 mg/5 ml (MILK OF MAGNESIA) 400 mg/5 mL Susp Take 30 mLs by mouth daily as needed (constipation).   Yes Historical Provider   melatonin 5 mg TbDL Take 15 mg by mouth every evening.   Yes Historical Provider   metoprolol succinate (TOPROL-XL) 25 MG 24 hr tablet Take 25 mg by mouth once daily.   Yes Historical Provider   multivitamin (THERAGRAN) per tablet Take 1 tablet by mouth once daily.   Yes Historical Provider   nitroGLYCERIN (NITROSTAT) 0.4 MG SL tablet Place 0.4 mg under the tongue every 5 (five) minutes as needed for Chest pain.   Yes Historical Provider   spironolactone (ALDACTONE) 25 MG tablet Take 25 mg by mouth once daily.   Yes Historical Provider   thiamine 100 MG tablet Take 100 mg by mouth once daily.   Yes Historical Provider   traZODone (DESYREL) 50 MG tablet Take 25 mg by mouth every evening.   Yes Historical Provider       Scheduled Meds:    ceFEPime (MAXIPIME) IVPB  1 g Intravenous Q12H    glycopyrrolate (PF)  0.1 mg Intravenous Q4H    metoprolol  5 mg Intravenous Q6H    thiamine (VITAMIN B1) IVPB  500 mg Intravenous TID      PRN Meds: albuterol-ipratropium, heparin (PORCINE), heparin (PORCINE), OLANZapine, sodium chloride 3%   Psychotherapeutics (From admission, onward)       Start     Stop Route Frequency Ordered    10/12/22 1542  OLANZapine injection 2.5 mg         -- IM Every 8 hours PRN 10/12/22 1442            LABORATORY DATA   Recent Results (from the past 72 hour(s))   APTT    Collection Time: 10/14/22  6:06 PM   Result Value Ref Range    aPTT 37.7 (H) 21.0 - 32.0 sec   Renal Function Panel    Collection Time: 10/14/22  6:06 PM   Result Value Ref Range    Glucose 134 (H) 70 - 110 mg/dL    Sodium 140 136 - 145 mmol/L    Potassium 3.2 (L) 3.5 - 5.1 mmol/L    Chloride 115 (H) 95 - 110 mmol/L    CO2 19 (L) 23 - 29 mmol/L    BUN 22 8 - 23 mg/dL    Calcium 9.8 8.7 - 10.5 mg/dL    Creatinine 1.2 0.5 - 1.4 mg/dL    Albumin 2.6 (L) 3.5 - 5.2 g/dL    Phosphorus 1.6 (L) 2.7 - 4.5 mg/dL    eGFR >60 >60 mL/min/1.73 m^2    Anion Gap 6 (L) 8 - 16 mmol/L   Osmolality, urine    Collection Time: 10/14/22  6:30 PM   Result Value Ref Range    Osmolality, Urine 355 50 - 1200 mOsm/kg   Culture, Respiratory with Gram Stain    Collection Time: 10/14/22 10:23 PM    Specimen: Sputum; Respiratory   Result Value Ref Range    Respiratory Culture Specimen inadequate - culture not performed     Gram Stain (Respiratory) >10epis/lfp and <than many WBC's     Gram Stain (Respiratory)       Predominance of oropharyngeal xochitl. Please recollect.   APTT    Collection Time: 10/15/22  1:07 AM   Result Value Ref Range    aPTT 70.8 (H) 21.0 - 32.0 sec   POCT glucose    Collection Time: 10/15/22  6:43 AM   Result Value Ref Range    POCT Glucose 108 70 - 110 mg/dL   CBC auto differential    Collection Time: 10/15/22  6:44 AM   Result Value Ref Range    WBC 16.51 (H) 3.90 - 12.70 K/uL    RBC 3.28 (L) 4.60 - 6.20 M/uL    Hemoglobin 10.6 (L) 14.0 - 18.0 g/dL    Hematocrit 33.9 (L) 40.0 - 54.0 %     (H) 82 - 98 fL    MCH 32.3 (H) 27.0 - 31.0 pg    MCHC 31.3 (L) 32.0 - 36.0 g/dL    RDW 14.8 (H) 11.5 - 14.5 %    Platelets 322 150 - 450 K/uL    MPV 9.5 9.2 - 12.9 fL    Immature Granulocytes 0.5 0.0 - 0.5 %    Gran # (ANC)  12.9 (H) 1.8 - 7.7 K/uL    Immature Grans (Abs) 0.09 (H) 0.00 - 0.04 K/uL    Lymph # 2.2 1.0 - 4.8 K/uL    Mono # 1.2 (H) 0.3 - 1.0 K/uL    Eos # 0.2 0.0 - 0.5 K/uL    Baso # 0.04 0.00 - 0.20 K/uL    nRBC 0 0 /100 WBC    Gran % 78.1 (H) 38.0 - 73.0 %    Lymph % 13.1 (L) 18.0 - 48.0 %    Mono % 7.1 4.0 - 15.0 %    Eosinophil % 1.0 0.0 - 8.0 %    Basophil % 0.2 0.0 - 1.9 %    Differential Method Automated    Renal Function Panel    Collection Time: 10/15/22  6:44 AM   Result Value Ref Range    Glucose 119 (H) 70 - 110 mg/dL    Sodium 139 136 - 145 mmol/L    Potassium 3.2 (L) 3.5 - 5.1 mmol/L    Chloride 113 (H) 95 - 110 mmol/L    CO2 18 (L) 23 - 29 mmol/L    BUN 16 8 - 23 mg/dL    Calcium 9.4 8.7 - 10.5 mg/dL    Creatinine 1.0 0.5 - 1.4 mg/dL    Albumin 2.7 (L) 3.5 - 5.2 g/dL    Phosphorus 1.7 (L) 2.7 - 4.5 mg/dL    eGFR >60 >60 mL/min/1.73 m^2    Anion Gap 8 8 - 16 mmol/L   APTT    Collection Time: 10/15/22  6:44 AM   Result Value Ref Range    aPTT 48.4 (H) 21.0 - 32.0 sec   Magnesium    Collection Time: 10/15/22  6:44 AM   Result Value Ref Range    Magnesium 1.5 (L) 1.6 - 2.6 mg/dL   Procalcitonin    Collection Time: 10/15/22 11:03 AM   Result Value Ref Range    Procalcitonin 0.07 <0.25 ng/mL   APTT    Collection Time: 10/15/22  1:49 PM   Result Value Ref Range    aPTT 25.1 21.0 - 32.0 sec   APTT    Collection Time: 10/15/22  9:55 PM   Result Value Ref Range    aPTT 57.7 (H) 21.0 - 32.0 sec   Magnesium    Collection Time: 10/16/22  3:48 AM   Result Value Ref Range    Magnesium 1.9 1.6 - 2.6 mg/dL   CBC auto differential    Collection Time: 10/16/22  3:48 AM   Result Value Ref Range    WBC 14.48 (H) 3.90 - 12.70 K/uL    RBC 3.42 (L) 4.60 - 6.20 M/uL    Hemoglobin 10.9 (L) 14.0 - 18.0 g/dL    Hematocrit 35.2 (L) 40.0 - 54.0 %     (H) 82 - 98 fL    MCH 31.9 (H) 27.0 - 31.0 pg    MCHC 31.0 (L) 32.0 - 36.0 g/dL    RDW 14.7 (H) 11.5 - 14.5 %    Platelets 288 150 - 450 K/uL    MPV 9.7 9.2 - 12.9 fL    Immature  Granulocytes 0.5 0.0 - 0.5 %    Gran # (ANC) 11.0 (H) 1.8 - 7.7 K/uL    Immature Grans (Abs) 0.07 (H) 0.00 - 0.04 K/uL    Lymph # 2.3 1.0 - 4.8 K/uL    Mono # 1.0 0.3 - 1.0 K/uL    Eos # 0.1 0.0 - 0.5 K/uL    Baso # 0.03 0.00 - 0.20 K/uL    nRBC 0 0 /100 WBC    Gran % 75.7 (H) 38.0 - 73.0 %    Lymph % 15.6 (L) 18.0 - 48.0 %    Mono % 7.2 4.0 - 15.0 %    Eosinophil % 0.8 0.0 - 8.0 %    Basophil % 0.2 0.0 - 1.9 %    Differential Method Automated    Renal Function Panel    Collection Time: 10/16/22  3:48 AM   Result Value Ref Range    Glucose 125 (H) 70 - 110 mg/dL    Sodium 138 136 - 145 mmol/L    Potassium 4.1 3.5 - 5.1 mmol/L    Chloride 113 (H) 95 - 110 mmol/L    CO2 18 (L) 23 - 29 mmol/L    BUN 12 8 - 23 mg/dL    Calcium 9.1 8.7 - 10.5 mg/dL    Creatinine 1.0 0.5 - 1.4 mg/dL    Albumin 2.5 (L) 3.5 - 5.2 g/dL    Phosphorus 2.2 (L) 2.7 - 4.5 mg/dL    eGFR >60 >60 mL/min/1.73 m^2    Anion Gap 7 (L) 8 - 16 mmol/L   APTT    Collection Time: 10/16/22  3:48 AM   Result Value Ref Range    aPTT 90.2 (H) 21.0 - 32.0 sec   APTT    Collection Time: 10/16/22  1:17 PM   Result Value Ref Range    aPTT 52.0 (H) 21.0 - 32.0 sec   APTT    Collection Time: 10/16/22  6:50 PM   Result Value Ref Range    aPTT 50.4 (H) 21.0 - 32.0 sec   Magnesium    Collection Time: 10/17/22  4:24 AM   Result Value Ref Range    Magnesium 1.8 1.6 - 2.6 mg/dL   Renal Function Panel    Collection Time: 10/17/22  4:24 AM   Result Value Ref Range    Glucose 126 (H) 70 - 110 mg/dL    Sodium 140 136 - 145 mmol/L    Potassium 3.8 3.5 - 5.1 mmol/L    Chloride 113 (H) 95 - 110 mmol/L    CO2 21 (L) 23 - 29 mmol/L    BUN 11 8 - 23 mg/dL    Calcium 9.5 8.7 - 10.5 mg/dL    Creatinine 1.0 0.5 - 1.4 mg/dL    Albumin 2.6 (L) 3.5 - 5.2 g/dL    Phosphorus 2.3 (L) 2.7 - 4.5 mg/dL    eGFR >60 >60 mL/min/1.73 m^2    Anion Gap 6 (L) 8 - 16 mmol/L   CBC auto differential    Collection Time: 10/17/22  4:24 AM   Result Value Ref Range    WBC 12.36 3.90 - 12.70 K/uL    RBC 3.44  (L) 4.60 - 6.20 M/uL    Hemoglobin 11.0 (L) 14.0 - 18.0 g/dL    Hematocrit 35.7 (L) 40.0 - 54.0 %     (H) 82 - 98 fL    MCH 32.0 (H) 27.0 - 31.0 pg    MCHC 30.8 (L) 32.0 - 36.0 g/dL    RDW 14.9 (H) 11.5 - 14.5 %    Platelets 297 150 - 450 K/uL    MPV 10.1 9.2 - 12.9 fL    Immature Granulocytes 0.6 (H) 0.0 - 0.5 %    Gran # (ANC) 9.8 (H) 1.8 - 7.7 K/uL    Immature Grans (Abs) 0.07 (H) 0.00 - 0.04 K/uL    Lymph # 1.6 1.0 - 4.8 K/uL    Mono # 0.7 0.3 - 1.0 K/uL    Eos # 0.1 0.0 - 0.5 K/uL    Baso # 0.02 0.00 - 0.20 K/uL    nRBC 0 0 /100 WBC    Gran % 79.4 (H) 38.0 - 73.0 %    Lymph % 13.3 (L) 18.0 - 48.0 %    Mono % 5.8 4.0 - 15.0 %    Eosinophil % 0.7 0.0 - 8.0 %    Basophil % 0.2 0.0 - 1.9 %    Differential Method Automated    APTT    Collection Time: 10/17/22  4:24 AM   Result Value Ref Range    aPTT 44.7 (H) 21.0 - 32.0 sec      No results found for: PHENYTOIN, PHENOBARB, VALPROATE, CBMZ      EXAMINATION    VITALS   Vitals:    10/17/22 0815 10/17/22 0817 10/17/22 1156 10/17/22 1241   BP: (!) 147/98  94/70    BP Location:   Right leg    Patient Position:   Lying    Pulse: 81  64 74   Resp:  16 18    Temp: 98 °F (36.7 °C)  97.4 °F (36.3 °C)    TempSrc: Axillary  Axillary    SpO2:  98% 97%    Weight:       Height:          CONSTITUTIONAL  General Appearance: NAD, unremarkable, age appropriate, disheveled, lying in bed, calm, somnolence, confused, normal weight      MUSCULOSKELETAL  Muscle Strength and Tone: attempted but unable to assess due to patient's AMS / Delirium / Dementia  Abnormal Involuntary Movements: none observed ; no tremors observed   Gait and Station: attempted but unable to assess due to patient's AMS / Delirium / Dementia     PSYCHIATRIC   Behavior/Cooperation:  reluctant to participate, uncooperative, psychomotor retardation, eye contact minimal  Speech:  slowed, dysarthia, increased latency of response, soft, nonsensical   Language: grossly intact with spontaneous speech  Mood: attempted but  unable to assess due to patient's AMS / Delirium / Dementia  Affect:  Constricted   Associations: +ASHWIN  Thought Process: Confused / Disorganized / ASHWIN   Thought Content: attempted but unable to assess due to patient's AMS / Delirium / Dementia  Sensorium:  Awake/Delirium  Alert and Oriented: to self only.  He was disoriented to name of place, type of place, city, state, month, year, and situation.  Memory: attempted but unable to assess due to patient's AMS / Delirium / Dementia  Attention/concentration: Impaired / Limited   Similarities: Impaired / Limited   Abstract reasoning: Impaired / Limited  Fund of Knowledge: attempted but unable to assess due to patient's AMS / Delirium / Dementia  Insight: Impaired / Limited  Judgment: Impaired / Limited      CAM ICU Delirium Assessment - POSITIVE      Is the patient aware of the biomedical complications associated with substance abuse and mental illness?   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    -Per Palliative Care Team on 10/17/22:  Reached out to the pts REMIGIO Melgar for collateral information.  [facility] reports a recent drastic decline in the pts over all functional status both mentally and physically over the past few months.  Pt was placed in [facility] for a few days, sent to [facility] due to concerns and then placed at [facility].  [facility] is in agreement for pt to return to [facility] when cleared.  We discussed goals of care.  [facility] endorses that pt had expressed that he did not want to artificially prolong his life with invasive  "measures in the past.  We discussed the pts multiple failed swallow studies recently.  REMIGIO stated that the pt would not want a feeding tube of any kind.  I introduced the topic of hospice by name. REMIGIO is very familiar with hospice.  We discussed what hospice could offer the pt.  REMIGIO agrees that enrolling the pt in hospice upon discharge to NH aligns with the pts goals.  MD and [place] made aware.       - Continue to HOLD prior Lithium and Haldol at this time (patient with ALICIA on admission ; improving on CMP this morning) (attempted to discuss risks/benefits/alt vs no treatment with patient).     - Would have liked to begin Depacon 250 mg IV TID for mood / behavioral disturbances in delirium / dementia & Bipolar dx, but pharmacy is out of this medication at this time (nationwide supply chain issue) (no reasonable alternative at this time while patient can't take medications orally) (attempted to discuss risks/benefits/alt vs no treatment with patient).     Implement / Continue the below DELIRIUM BEHAVIOR MANAGEMENT:  - Minimize use of restraints; if physical restraints necessary, please utilize medical/chemical prns for agitation (can use Zyprexa 2.5 mg to 5 mg PO/IM q8 hours PRN).  - Keep shades open and room lit during day and room dim at night in order to promote healthy circadian rhythms.  - Encourage family at bedside.  - Keep whiteboard in patient's room current with the date and name of the members of patient's team for easy patient self re-orientation.  - Avoid benzodiazepines, antihistamines, anticholinergics, hypnotics, and minimize opiates while controlling for pain as these medications may exacerbate delirium.      - Psychotherapy was again performed with patient as noted above with a focus on improving orientation, confusion, mood / irritability, and behavior.     - Patient's most recent resulted labs, imaging, and EKG were reviewed again today ; CT Head from 10/11/2022 with "global prominence of the " "ventricles, cisterns and sulci as seen with senescent atrophic changes similar to prior exam.  Moderate confluent low density in the periventricular white matter is again noted as commonly seen with chronic microvascular ischemic changes.  Remote lacunar infarcts are again noted bilaterally in the basal ganglia.  No appreciable mass or mass effect is seen.  Remote left inferior cerebellar infarct again noted." Ammonia level was wnl at 26 from 10/12/22 and Lithium level was 1.1 from 10/12/22.  Folate & B12 wnl ; Niacin level pending ; Per Neurology: "Mri brain with atrophy of midbrain with relative preservation of zoie suggesting Progressive Supranuclear palsy diagnosis. Encephalopathy secondary to infectious/ metabolic deficits."      - Continue to monitor EKG to ensure that QTc remains below 500, especially if patient is requiring any PRN neuroleptic medications.      - Provide Folate / Thiamine / Multi-Vitamin supplementation.     - See Neurology Team A/P from 10/14/2022.     - Thank you for this consult ; will continue to follow patient         Total time spent with patient and/or managing/coordinating patient's care today (excluding the time spent on psychotherapy): 42 minutes   Time spent on psychotherapy today (as noted above): 18 minutes   Total time for encounter today including psychotherapy: 60 minutes      More than 50% of the time was spent counseling/coordinating care.     Consulting clinician was informed of the encounter and consult note.      STAFF:  Frederick Ramos MD  [facility]  10/17/2022   "

## 2022-10-17 NOTE — CONSULTS
"[facility]  Palliative Medicine  Consult Note    Patient Name: Cheikh Yanez  MRN: [id number]  Admission Date: 10/11/2022  Hospital Length of Stay: 5 days  Code Status: DNR   Attending Provider: Mike Woods MD  Consulting Provider: Hannah Renee NP  Primary Care Physician: Primary Doctor No  Principal Problem:Encephalopathy due to infection    Patient information was obtained from relative(s), past medical records and primary team.      Inpatient consult to Palliative Care  Consult performed by: Hannah Renee NP  Consult ordered by: Miryam Cadet PA-C  Reason for consult: Goals of Care/ Advanced Care Planning         Assessment/Plan:     Palliative care encounter  At time of initial consult, pt sitting up in bed.  Pt able to follow simple commands.  When asked where he was the pt responded "[facility]."  All other verbalizations  were muddled and unintelligible. Pt demonstrates wet cough throughout interview.  Pt with difficulty clearing secretions.  Glycopyrrolate iv ordered.    [facility].  [facility] reports a recent drastic decline in the pts over all functional status both mentally and physically over the past few months.  Pt was placed in [facility] for a few days, sent to [facility] due to concerns and then placed at [facility].  [facility] is in agreement for pt to return to [facility] when cleared.  We discussed goals of care.  [facility] that pt had expressed that he did not want to artificially prolong his life with invasive measures in the past.  We discussed the pts multiple failed swallow studies recently.  [facility] stated that the pt would not want a feeding tube of any kind.  I introduced the topic of hospice by name. [facility] is very familiar with hospice.  We discussed what hospice could offer the pt.  [facility] agrees that enrolling the pt in hospice upon discharge to NH aligns with the pts goals.  MD and [place] made aware.           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 Patient unable to provide any history. Per RN, patient normally oriented to self and now is somnolent.  ED findings:  WBCs 16.91, potassium 5.2, creatinine 2.2, troponin 0.120, EKG without ischemic changes, u/a concerning for infectious process,  chest x-ray showed a right basilar airspace opacity, covid pending, blood cultures pending.  Patient admitted to hospital medicine observation unit for further medical management. "      Hospital Course:  Pt admitted for AMS, pt sent from [facility].  PEC in place.  UA notable for infection, pt started on ceftriaxone.  Psychiatry consulted, PEC discontinued.  Noted baseline by caregiver described as "vegetative state" and at baseline oriented to person and situation.  It was recommended to hold lithium and haldol with ALICIA.  Depacon recommended however nationwide supply chain issue and no reasonable alternative.  Zyprexa prn agitation.  Folate/Thiamine/MVI.  RD consult as pt failed swallow assessment.  Neurology consult for encephalopathy.  10/13 UC with multiple organisms none in predominance, micro lab will review plate again 10/13.  UC with proteus vulgaris resistant to ceftriaxone and changed to cefepime on 10/15.  Neurology consulted and suspect metabolic encephalopathy.  Nephrology agrees with D5 for treatment of hypernatremia with noted improvement.  ST consulted and recommended NPO.  Cardiology consulted for abnormal rhythm, telemetry reviewed and paced rhythm with underlying afib rate controlled with PVCs and bigeminy; no evidence of afib with RVR or VTach.  Continue IV Metoprolol for now with transition to oral BB at home dose when able to take po meds/fluids.  Palliative Care consulted and pt changed to DNR.  No notes " on file    Interval History: No acute events overnight.  Pt seen by SLP again today, pt remains NPO.    No past medical history on file.    No past surgical history on file.    Review of patient's allergies indicates:  No Known Allergies    Medications:  Continuous Infusions:   dextrose 5 % 75 mL/hr at 10/16/22 1718    heparin (porcine) in D5W 9 Units/kg/hr (10/16/22 2006)     Scheduled Meds:   ceFEPime (MAXIPIME) IVPB  1 g Intravenous Q12H    glycopyrrolate (PF)  0.1 mg Intravenous Q4H    metoprolol  5 mg Intravenous Q6H    thiamine (VITAMIN B1) IVPB  500 mg Intravenous TID     PRN Meds:albuterol-ipratropium, heparin (PORCINE), heparin (PORCINE), OLANZapine, sodium chloride 3%    Family History    None       Tobacco Use    Smoking status: Not on file    Smokeless tobacco: Not on file   Substance and Sexual Activity    Alcohol use: Not on file    Drug use: Not on file    Sexual activity: Not on file       Review of Systems   Unable to perform ROS: Mental status change   Objective:     Vital Signs (Most Recent):  Temp: 97.4 °F (36.3 °C) (10/17/22 1156)  Pulse: 74 (10/17/22 1241)  Resp: 18 (10/17/22 1156)  BP: 94/70 (10/17/22 1156)  SpO2: 97 % (10/17/22 1156) Vital Signs (24h Range):  Temp:  [96.6 °F (35.9 °C)-98 °F (36.7 °C)] 97.4 °F (36.3 °C)  Pulse:  [62-95] 74  Resp:  [14-21] 18  SpO2:  [97 %-100 %] 97 %  BP: ()/(55-98) 94/70     Weight: 66.2 kg (145 lb 15.1 oz)  Body mass index is 22.86 kg/m².    Physical Exam  Vitals and nursing note reviewed.   Constitutional:       Appearance: He is ill-appearing.   HENT:      Head: Normocephalic.      Mouth/Throat:      Mouth: Mucous membranes are dry.   Cardiovascular:      Rate and Rhythm: Rhythm irregular.   Pulmonary:      Effort: Pulmonary effort is normal.      Breath sounds: Examination of the right-upper field reveals rhonchi. Examination of the left-upper field reveals rhonchi. Examination of the right-middle field reveals rhonchi. Examination of the  left-middle field reveals rhonchi. Examination of the right-lower field reveals rhonchi. Examination of the left-lower field reveals rhonchi. Rhonchi present.      Comments: Frequent wet cough   Abdominal:      General: Abdomen is flat. Bowel sounds are normal.      Palpations: Abdomen is soft.   Skin:     General: Skin is dry.      Capillary Refill: Capillary refill takes less than 2 seconds.      Coloration: Skin is pale.   Neurological:      Mental Status: He is easily aroused.      Comments: Most responses were unintelligible and garbled.    Psychiatric:         Speech: Speech is slurred.         Behavior: Behavior is slowed. Behavior is cooperative.       Review of Symptoms      Symptom Assessment (ESAS 0-10 Scale)  Pain:  0  Dyspnea:  0  Anxiety:  0  Nausea:  0  Depression:  0  Anorexia:  0  Fatigue:  0  Insomnia:  0  Restlessness:  0  Agitation:  0  Unable to complete assessment due to Mental status change         Pain Assessment in Advanced Demential Scale (PAINAD)   Breathing - Independent of vocalization:  0  Negative vocalization:  0  Facial expression:  0  Body language:  0  Consolability:  0  Total:  0    Performance Status:  30    Living Arrangements:  Lives in nursing home    Psychosocial/Cultural: Pt came from [facility], prior to that, pt lived in a double home with a caretaker who lived next door.     Spiritual:  F - Camille and Belief:  Unknown   I - Importance:  Unknown      Time-Based Charting:  Yes  Chart Review: 21 minutes  Face to Face: 14 minutes  Symptom Assessment: 17 minutes  Coordination of Care: 15 minutes  Discharge Planning: 10 minutes  Advance Care Plannin minutes  Goals of Care: 8 minutes    Total Time Spent: 93 minutes      Advance Care Planning   Advance Directives:   Living Will: Yes        Copy on chart: Yes    Do Not Resuscitate Status: Yes    Medical Power of : Yes    Agent's Name:  Jc Melgar    Decision Making:  Patient unable to communicate due to  disease severity/cognitive impairment  Goals of Care: The healthcare power of   endorses that what is most important right now is to focus on avoiding the hospital, quality of life, even if it means sacrificing a little time, and comfort and QOL     Accordingly, we have decided that the best plan to meet the patient's goals includes enrolling in hospice care       Significant Labs: All pertinent labs within the past 24 hours have been reviewed.  CBC:   Recent Labs   Lab 10/17/22  0424   WBC 12.36   HGB 11.0*   HCT 35.7*   *        BMP:  Recent Labs   Lab 10/17/22  0424   *      K 3.8   *   CO2 21*   BUN 11   CREATININE 1.0   CALCIUM 9.5   MG 1.8     LFT:  Lab Results   Component Value Date    AST 17 10/14/2022    ALKPHOS 94 10/14/2022    BILITOT 0.5 10/14/2022     Albumin:   Albumin   Date Value Ref Range Status   10/17/2022 2.6 (L) 3.5 - 5.2 g/dL Final     Protein:   Total Protein   Date Value Ref Range Status   10/14/2022 5.3 (L) 6.0 - 8.4 g/dL Final     Lactic acid:   Lab Results   Component Value Date    LACTATE 1.0 10/11/2022       Significant Imaging: I have reviewed all pertinent imaging results/findings within the past 24 hours.        Hannah Renee NP  Palliative Medicine  [facility]    Advance Care Planning     Date: 10/17/2022    [facility]  I engaged the healthcare power of   in a conversation about advance care planning and we specifically addressed what the goals of care would be moving forward, in light of the patient's change in clinical status, specifically end stage dementia.  We did specifically address the patient's likely prognosis, which is poor.  We explored the patient's values and preferences for future care.  The healthcare power of   endorses that what is most important right now is to focus on remaining as independent as possible, quality of life, even if it means sacrificing a little time and comfort and QOL     Accordingly, we  have decided that the best plan to meet the patient's goals includes enrolling in hospice care    I did explain the role for hospice care at this stage of the patient's illness, including its ability to help the patient live with the best quality of life possible.  We will be making a hospice referral.    I spent a total of 18 minutes engaging the patient in this advance care planning discussion.

## 2022-10-17 NOTE — HPI
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 Hernandez), HTN, CVA, PE, CAD, bipolar disorder, suicidal ideation and leukocytosis who presented to the ER with AMS. He was admitted at [facility] and was noted to be somnolent and altered. He was sent to the ER for evaluation with notation of leukocytosis and ALICIA. Troponin .120 and EKG with no acute changes. He was admitted to [facility] and treated for infectious etiology. He was monitored on telemetry with concern for abnormal rhythm therefore cardiology was consulted. He was seen on AM rounds this morning with Dr Lake. He was answering questions but was mumbling and difficult to understand therefore uncertain of full orientation and HPI limited.

## 2022-10-17 NOTE — PLAN OF CARE
10/17/22 1549   Post-Acute Status   Post-Acute Authorization Placement;Hospice   Post-Acute Placement Status Pending post-acute provider review/more information requested   Hospice Status Pending medical clearance/testing     Palliative consult done. Plans to make hospice referral. Pt needs 4 more days of IV ABX. TN called facility to see if it can still be provided at NH with Hospice. Intake team is out for the day. TN to follow up in am.

## 2022-10-17 NOTE — PLAN OF CARE
Pt seen this date for repeat swallow eval but is not appropriate. Pt with overt wet and gurgly voice, needs deep suctioning and made no attempt to swallow minimal tsp of applesauce. Notified NP, pt needs GOC discussion to be had with family.

## 2022-10-17 NOTE — ASSESSMENT & PLAN NOTE
Bipolar disorder    Transferred from [facility] due to medical issues  Stable  PEC on admission  Consult Psychiatry for continued management   Psych recommended d/c PEC

## 2022-10-17 NOTE — ASSESSMENT & PLAN NOTE
- history of afib treated with Toprol XL and Eliquis as an outpatient  - on IV Metoprolol currently given inability to take po meds  - HR 50s-90s overnight; EKG this AM with afib rhythm; no evidence of RVR noted on telemetry  - continue IV Metoprolol for now; transition back to oral Metoprolol when able to tolerate po meds

## 2022-10-17 NOTE — CONSULTS
[facility] - Telemetry  Cardiology  Consult Note    Patient Name: Cheikh Yanez  MRN: [id number]  Admission Date: 10/11/2022  Hospital Length of Stay: 5 days  Code Status: DNR   Attending Provider: Mike Woods MD   Consulting Provider: CHELSIE Luke ANP  Primary Care Physician: Primary Doctor No  Principal Problem:Encephalopathy due to infection    Patient information was obtained from past medical records and ER records.     Inpatient consult to [facility]iology-[facility]  Consult performed by: CHELSIE Dunn ANP  Consult ordered by: Miryam Cadet PA-C  Reason for consult: afib; pacemaker         Subjective:     Chief Complaint:  AMS      HPI:   80yo male with HCAP, UTI, elevated troponin, afib on Eliquis, PPM 5/2020 for syncopal event (followed by Dr. Hernandez), HTN, CVA, PE, CAD, bipolar disorder, suicidal ideation and leukocytosis who presented to the ER with AMS. He was admitted at [facility] and was noted to be somnolent and altered. He was sent to the ER for evaluation with notation of leukocytosis and ALICIA. Troponin .120 and EKG with no acute changes. He was admitted to [facility] and treated for infectious etiology. He was monitored on telemetry with concern for abnormal rhythm therefore cardiology was consulted. He was seen on AM rounds this morning with Dr Lake. He was answering questions but was mumbling and difficult to understand therefore uncertain of full orientation and HPI limited.         Hospital Course: 10/17/2022 per HPI    No past medical history on file.    No past surgical history on file.    Review of patient's allergies indicates:  No Known Allergies    No current facility-administered medications on file prior to encounter.     Current Outpatient Medications on File Prior to Encounter   Medication Sig    acetaminophen (TYLENOL) 650 MG TbSR Take 650 mg by mouth 4 (four) times daily as needed.    aluminum-magnesium hydroxide-simethicone (MAALOX) 200-200-20 mg/5  mL Susp Take 30 mLs by mouth every 4 (four) hours as needed (GI upset).    apixaban (ELIQUIS) 5 mg Tab Take 5 mg by mouth 2 (two) times daily.    atorvastatin (LIPITOR) 80 MG tablet Take 80 mg by mouth once daily.    DULoxetine (CYMBALTA) 20 MG capsule Take 40 mg by mouth once daily.    finasteride (PROSCAR) 5 mg tablet Take 5 mg by mouth once daily.    lisinopriL (PRINIVIL,ZESTRIL) 40 MG tablet Take 40 mg by mouth once daily.    magnesium hydroxide 400 mg/5 ml (MILK OF MAGNESIA) 400 mg/5 mL Susp Take 30 mLs by mouth daily as needed (constipation).    melatonin 5 mg TbDL Take 15 mg by mouth every evening.    metoprolol succinate (TOPROL-XL) 25 MG 24 hr tablet Take 25 mg by mouth once daily.    multivitamin (THERAGRAN) per tablet Take 1 tablet by mouth once daily.    nitroGLYCERIN (NITROSTAT) 0.4 MG SL tablet Place 0.4 mg under the tongue every 5 (five) minutes as needed for Chest pain.    spironolactone (ALDACTONE) 25 MG tablet Take 25 mg by mouth once daily.    thiamine 100 MG tablet Take 100 mg by mouth once daily.    traZODone (DESYREL) 50 MG tablet Take 25 mg by mouth every evening.     Family History    None       Tobacco Use    Smoking status: Not on file    Smokeless tobacco: Not on file   Substance and Sexual Activity    Alcohol use: Not on file    Drug use: Not on file    Sexual activity: Not on file     Review of Systems   Unable to perform ROS: Other   Objective:     Vital Signs (Most Recent):  Temp: 98 °F (36.7 °C) (10/17/22 0815)  Pulse: 81 (10/17/22 0815)  Resp: 16 (10/17/22 0817)  BP: (!) 147/98 (10/17/22 0815)  SpO2: 98 % (10/17/22 0817)   Vital Signs (24h Range):  Temp:  [96.6 °F (35.9 °C)-98 °F (36.7 °C)] 98 °F (36.7 °C)  Pulse:  [62-95] 81  Resp:  [14-21] 16  SpO2:  [96 %-100 %] 98 %  BP: ()/(55-98) 147/98     Weight: 66.2 kg (145 lb 15.1 oz)  Body mass index is 22.86 kg/m².    SpO2: 98 %  O2 Device (Oxygen Therapy): room air      Intake/Output Summary (Last 24 hours) at  10/17/2022 0946  Last data filed at 10/17/2022 0635  Gross per 24 hour   Intake 0 ml   Output 1900 ml   Net -1900 ml       Lines/Drains/Airways       Drain  Duration             Male External Urinary Catheter 10/13/22 0830 Small 4 days              Peripheral Intravenous Line  Duration                  Peripheral IV - Single Lumen 10/13/22 0930 Anterior;Left Forearm 4 days         Peripheral IV - Single Lumen 10/15/22 1000 22 G Left;Posterior Hand 1 day         Peripheral IV - Single Lumen 10/15/22 1359 22 G Anterior;Proximal;Right Upper Arm 1 day                    Physical Exam  Cardiovascular:      Rate and Rhythm: Normal rate. Rhythm irregularly irregular.   Pulmonary:      Effort: Pulmonary effort is normal.   Neurological:      Mental Status: He is alert.      Comments: Mumbled speech with inability to assess orientation        Significant Labs: BMP:   Recent Labs   Lab 10/16/22  0348 10/17/22  0424   * 126*    140   K 4.1 3.8   * 113*   CO2 18* 21*   BUN 12 11   CREATININE 1.0 1.0   CALCIUM 9.1 9.5   MG 1.9 1.8   , CBC   Recent Labs   Lab 10/16/22  0348 10/17/22  0424   WBC 14.48* 12.36   HGB 10.9* 11.0*   HCT 35.2* 35.7*    297     Significant Imaging: Echocardiogram: Transthoracic echo (TTE) complete ([facility] Only):   Results for orders placed or performed during the hospital encounter of 10/11/22   Echo   Result Value Ref Range    BSA 1.76 m2    LA WIDTH 3.60 cm    Left Ventricular Outflow Tract Mean Velocity 0.61 cm/s    Left Ventricular Outflow Tract Mean Gradient 1.59 mmHg    LVIDd 4.26 3.5 - 6.0 cm    IVS 0.66 0.6 - 1.1 cm    Posterior Wall 0.91 0.6 - 1.1 cm    LVIDs 2.75 2.1 - 4.0 cm    FS 35 28 - 44 %    LA volume 61.48 cm3    STJ 2.90 cm    LV mass 101.14 g    LA size 3.17 cm    RVDD 2.79 cm    RV S' 0.01 cm/s    Left Ventricle Relative Wall Thickness 0.43 cm    AV mean gradient 11 mmHg    AV valve area 1.36 cm2    AV Velocity Ratio 0.37     AV index (prosthetic) 0.41     MV  mean gradient 2 mmHg    MV valve area by continuity eq 1.25 cm2    IVRT 125.59 msec    Pulm vein S/D ratio 1.06     LVOT diameter 2.06 cm    LVOT area 3.3 cm2    LVOT peak dean 0.79 m/s    LVOT peak VTI 14.20 cm    Ao peak dean 2.16 m/s    Ao VTI 34.7 cm    LVOT stroke volume 47.30 cm3    AV peak gradient 19 mmHg    MV peak gradient 6 mmHg    TR Max Dean 2.72 m/s    MV VTI 37.7 cm    PV Peak S Dean 0.33 m/s    PV Peak D Dean 0.31 m/s    LV Systolic Volume 28.17 mL    LV Systolic Volume Index 16.0 mL/m2    LV Diastolic Volume 81.04 mL    LV Diastolic Volume Index 46.05 mL/m2    LA Volume Index 34.9 mL/m2    LV Mass Index 57 g/m2    RA Major Axis 6.05 cm    Left Atrium Minor Axis 6.63 cm    Left Atrium Major Axis 6.07 cm    Triscuspid Valve Regurgitation Peak Gradient 30 mmHg    LA Volume Index (Mod) 36.7 mL/m2    LA volume (mod) 64.61 cm3    RA Width 3.70 cm    Right Atrial Pressure (from IVC) 15 mmHg    EF 55 %    TV rest pulmonary artery pressure 45 mmHg    Narrative    · The left ventricle is normal in size with concentric remodeling and   normal systolic function.  · The estimated ejection fraction is 55%.  · A diastolic pattern consistent with atrial fibrillation observed.  · Mild-to-moderate mitral regurgitation.  · There is mild aortic valve stenosis.  · Aortic valve area is 1.36 cm2; peak velocity is 2.16 m/s; mean gradient   is 11 mmHg.  · Elevated central venous pressure (15 mmHg).  · The estimated PA systolic pressure is 45 mmHg.  · Mild tricuspid regurgitation.  · Normal right ventricular size with normal right ventricular systolic   function.  · There is pulmonary hypertension.        Assessment and Plan:     Cardiac pacemaker in situ  - single chamber pacemaker placed in 5/2020 due to syncope; followed by Dr. Hernandez  - reports of abnormal rhythm on telemetry; telemetry reviewed with Dr. Lake: paced rhythm with underlying afib rate controlled with PVCs and bigeminy; no evidence of afib with RVR or VTach   -  monitor on telemetry while admitted  - continue IV Metoprolol for now with transition to oral BB at home dose when able to take po meds/fluids     CAD (coronary artery disease)  - uncertain of details; followed by Dr. Hernandez  - on statin and BB as an outpatient; no ASA likely due to Eliquis use  - troponin minimally elevated at .08-.10 felt to be related to demand etiology     Hypertension  - SBP 90s-120s overnight  - on Toprol XL, Lisinopril and Aldactone as an outpatient  - continue IV BB only for now; resume oral meds as BP tolerates and once able to take po meds/fluids     A-fib  - history of afib treated with Toprol XL and Eliquis as an outpatient  - on IV Metoprolol currently given inability to take po meds  - HR 50s-90s overnight; EKG this AM with afib rhythm; no evidence of RVR noted on telemetry  - continue IV Metoprolol for now; transition back to oral Metoprolol when able to tolerate po meds    Elevated troponin  - as detailed under CAD         VTE Risk Mitigation (From admission, onward)         Ordered     heparin 25,000 units in dextrose 5% (100 units/ml) IV bolus from bag - ADDITIONAL PRN BOLUS - 60 units/kg  As needed (PRN)        Question:  Heparin Infusion Adjustment (DO NOT MODIFY ANSWER)  Answer:  \\[facility]\epic\Images\Pharmacy\HeparinInfusions\heparin LOW INTENSITY nomogram for [facility] [id number]    10/13/22 1326     heparin 25,000 units in dextrose 5% (100 units/ml) IV bolus from bag - ADDITIONAL PRN BOLUS - 30 units/kg  As needed (PRN)        Question:  Heparin Infusion Adjustment (DO NOT MODIFY ANSWER)  Answer:  \\[facility]\epic\Images\Pharmacy\HeparinInfusions\heparin LOW INTENSITY nomogram for [facility] [id number].pdf    10/13/22 1326     heparin 25,000 units in dextrose 5% 250 mL (100 units/mL) infusion LOW INTENSITY nomogram - [facility]  Continuous        Question Answer Comment   Heparin Infusion Adjustment (DO NOT MODIFY ANSWER) \[employer]\epic\Images\Pharmacy\HeparinInfusions\heparin LOW  INTENSITY nomogram for [facility] [id number].pdf    Begin at (in units/kg/hr) 12        10/13/22 1326                Thank you for your consult. I will sign off. Please contact us if you have any additional questions.    CHELSIE Luke, ANP  Cardiology   [facility] - Telemetry

## 2022-10-17 NOTE — SUBJECTIVE & OBJECTIVE
"Interval History:  no acute events overnight.  Pt saying some sentences this morning, still mumbling but improved from over the weekend.  Pt more alert this morning.  Baseline is oriented to person and situation, pt is not back to baseline.    Review of Systems   Unable to perform ROS: Mental status change   Objective:     Vital Signs (Most Recent):  Temp: 98 °F (36.7 °C) (10/17/22 0815)  Pulse: 81 (10/17/22 0815)  Resp: 16 (10/17/22 0817)  BP: (!) 147/98 (10/17/22 0815)  SpO2: 98 % (10/17/22 0817)   Vital Signs (24h Range):  Temp:  [96.6 °F (35.9 °C)-98 °F (36.7 °C)] 98 °F (36.7 °C)  Pulse:  [62-95] 81  Resp:  [14-21] 16  SpO2:  [96 %-100 %] 98 %  BP: ()/(55-98) 147/98     Weight: 66.2 kg (145 lb 15.1 oz)  Body mass index is 22.86 kg/m².    Intake/Output Summary (Last 24 hours) at 10/17/2022 1049  Last data filed at 10/17/2022 0635  Gross per 24 hour   Intake 0 ml   Output 1900 ml   Net -1900 ml      Physical Exam  HENT:      Head: Normocephalic and atraumatic.   Cardiovascular:      Rate and Rhythm: Normal rate. Rhythm regularly irregular.      Pulses: Normal pulses.   Pulmonary:      Effort: No respiratory distress.      Breath sounds: No wheezing.   Abdominal:      General: Bowel sounds are normal.      Palpations: Abdomen is soft.   Musculoskeletal:         General: No deformity.      Cervical back: Neck supple.   Skin:     General: Skin is warm and dry.   Neurological:      Mental Status: He is alert. He is disoriented.      GCS: GCS eye subscore is 4. GCS verbal subscore is 4. GCS motor subscore is 5.      Comments: At baseline described as "vegetative state" oriented to person and situation.  Pt close to baseline.       Significant Labs: All pertinent labs within the past 24 hours have been reviewed.    Significant Imaging: I have reviewed all pertinent imaging results/findings within the past 24 hours.  "

## 2022-10-17 NOTE — HPI
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 Patient unable to provide any history. Per RN, patient normally oriented to self and now is somnolent.  ED findings:  WBCs 16.91, potassium 5.2, creatinine 2.2, troponin 0.120, EKG without ischemic changes, u/a concerning for infectious process,  chest x-ray showed a right basilar airspace opacity, covid pending, blood cultures pending.  Patient admitted to hospital medicine observation unit for further medical management. "  "

## 2022-10-18 LAB
ALBUMIN SERPL BCP-MCNC: 2.6 G/DL (ref 3.5–5.2)
ANION GAP SERPL CALC-SCNC: 9 MMOL/L (ref 8–16)
APTT BLDCRRT: 44.7 SEC (ref 21–32)
BASOPHILS # BLD AUTO: 0.03 K/UL (ref 0–0.2)
BASOPHILS NFR BLD: 0.3 % (ref 0–1.9)
BUN SERPL-MCNC: 11 MG/DL (ref 8–23)
CALCIUM SERPL-MCNC: 9.4 MG/DL (ref 8.7–10.5)
CHLORIDE SERPL-SCNC: 111 MMOL/L (ref 95–110)
CO2 SERPL-SCNC: 18 MMOL/L (ref 23–29)
CREAT SERPL-MCNC: 1 MG/DL (ref 0.5–1.4)
DIFFERENTIAL METHOD: ABNORMAL
EOSINOPHIL # BLD AUTO: 0.1 K/UL (ref 0–0.5)
EOSINOPHIL NFR BLD: 1.2 % (ref 0–8)
ERYTHROCYTE [DISTWIDTH] IN BLOOD BY AUTOMATED COUNT: 14.9 % (ref 11.5–14.5)
EST. GFR  (NO RACE VARIABLE): >60 ML/MIN/1.73 M^2
GLUCOSE SERPL-MCNC: 103 MG/DL (ref 70–110)
HCT VFR BLD AUTO: 36.2 % (ref 40–54)
HGB BLD-MCNC: 11.5 G/DL (ref 14–18)
IMM GRANULOCYTES # BLD AUTO: 0.04 K/UL (ref 0–0.04)
IMM GRANULOCYTES NFR BLD AUTO: 0.4 % (ref 0–0.5)
LYMPHOCYTES # BLD AUTO: 1.5 K/UL (ref 1–4.8)
LYMPHOCYTES NFR BLD: 14.7 % (ref 18–48)
MCH RBC QN AUTO: 32.1 PG (ref 27–31)
MCHC RBC AUTO-ENTMCNC: 31.8 G/DL (ref 32–36)
MCV RBC AUTO: 101 FL (ref 82–98)
MONOCYTES # BLD AUTO: 0.7 K/UL (ref 0.3–1)
MONOCYTES NFR BLD: 6.4 % (ref 4–15)
NEUTROPHILS # BLD AUTO: 8.1 K/UL (ref 1.8–7.7)
NEUTROPHILS NFR BLD: 77 % (ref 38–73)
NRBC BLD-RTO: 0 /100 WBC
PHOSPHATE SERPL-MCNC: 2.3 MG/DL (ref 2.7–4.5)
PLATELET # BLD AUTO: 286 K/UL (ref 150–450)
PMV BLD AUTO: 10.3 FL (ref 9.2–12.9)
POTASSIUM SERPL-SCNC: 3.7 MMOL/L (ref 3.5–5.1)
RBC # BLD AUTO: 3.58 M/UL (ref 4.6–6.2)
SODIUM SERPL-SCNC: 138 MMOL/L (ref 136–145)
WBC # BLD AUTO: 10.49 K/UL (ref 3.9–12.7)

## 2022-10-18 PROCEDURE — 94761 N-INVAS EAR/PLS OXIMETRY MLT: CPT

## 2022-10-18 PROCEDURE — 25000003 PHARM REV CODE 250: Performed by: NURSE PRACTITIONER

## 2022-10-18 PROCEDURE — 99900035 HC TECH TIME PER 15 MIN (STAT)

## 2022-10-18 PROCEDURE — 36415 COLL VENOUS BLD VENIPUNCTURE: CPT | Performed by: INTERNAL MEDICINE

## 2022-10-18 PROCEDURE — 85730 THROMBOPLASTIN TIME PARTIAL: CPT | Performed by: INTERNAL MEDICINE

## 2022-10-18 PROCEDURE — 80069 RENAL FUNCTION PANEL: CPT | Performed by: PHYSICIAN ASSISTANT

## 2022-10-18 PROCEDURE — 85025 COMPLETE CBC W/AUTO DIFF WBC: CPT | Performed by: PHYSICIAN ASSISTANT

## 2022-10-18 PROCEDURE — 25000003 PHARM REV CODE 250: Performed by: STUDENT IN AN ORGANIZED HEALTH CARE EDUCATION/TRAINING PROGRAM

## 2022-10-18 PROCEDURE — 63600175 PHARM REV CODE 636 W HCPCS: Performed by: STUDENT IN AN ORGANIZED HEALTH CARE EDUCATION/TRAINING PROGRAM

## 2022-10-18 PROCEDURE — 93005 ELECTROCARDIOGRAM TRACING: CPT

## 2022-10-18 PROCEDURE — 25000003 PHARM REV CODE 250: Performed by: PHYSICIAN ASSISTANT

## 2022-10-18 PROCEDURE — 11000001 HC ACUTE MED/SURG PRIVATE ROOM

## 2022-10-18 PROCEDURE — 63600175 PHARM REV CODE 636 W HCPCS: Performed by: PHYSICIAN ASSISTANT

## 2022-10-18 PROCEDURE — 93010 EKG 12-LEAD: ICD-10-PCS | Mod: ,,, | Performed by: INTERNAL MEDICINE

## 2022-10-18 PROCEDURE — 93010 ELECTROCARDIOGRAM REPORT: CPT | Mod: ,,, | Performed by: INTERNAL MEDICINE

## 2022-10-18 RX ORDER — SODIUM,POTASSIUM PHOSPHATES 280-250MG
2 POWDER IN PACKET (EA) ORAL
Status: DISCONTINUED | OUTPATIENT
Start: 2022-10-18 | End: 2022-10-18

## 2022-10-18 RX ADMIN — METOROPROLOL TARTRATE 5 MG: 5 INJECTION, SOLUTION INTRAVENOUS at 01:10

## 2022-10-18 RX ADMIN — GLYCOPYRROLATE 0.1 MG: 0.2 INJECTION, SOLUTION INTRAMUSCULAR; INTRAVITREAL at 11:10

## 2022-10-18 RX ADMIN — GLYCOPYRROLATE 0.1 MG: 0.2 INJECTION, SOLUTION INTRAMUSCULAR; INTRAVITREAL at 01:10

## 2022-10-18 RX ADMIN — THIAMINE HYDROCHLORIDE 500 MG: 100 INJECTION, SOLUTION INTRAMUSCULAR; INTRAVENOUS at 10:10

## 2022-10-18 RX ADMIN — POTASSIUM PHOSPHATE, MONOBASIC AND POTASSIUM PHOSPHATE, DIBASIC 20 MMOL: 224; 236 INJECTION, SOLUTION, CONCENTRATE INTRAVENOUS at 12:10

## 2022-10-18 RX ADMIN — THIAMINE HYDROCHLORIDE 500 MG: 100 INJECTION, SOLUTION INTRAMUSCULAR; INTRAVENOUS at 09:10

## 2022-10-18 RX ADMIN — THIAMINE HYDROCHLORIDE 500 MG: 100 INJECTION, SOLUTION INTRAMUSCULAR; INTRAVENOUS at 04:10

## 2022-10-18 RX ADMIN — CEFEPIME 1 G: 1 INJECTION, POWDER, FOR SOLUTION INTRAMUSCULAR; INTRAVENOUS at 01:10

## 2022-10-18 RX ADMIN — Medication 1 ENEMA: at 04:10

## 2022-10-18 RX ADMIN — GLYCOPYRROLATE 0.1 MG: 0.2 INJECTION, SOLUTION INTRAMUSCULAR; INTRAVITREAL at 06:10

## 2022-10-18 RX ADMIN — CEFEPIME 1 G: 1 INJECTION, POWDER, FOR SOLUTION INTRAMUSCULAR; INTRAVENOUS at 02:10

## 2022-10-18 RX ADMIN — METOROPROLOL TARTRATE 5 MG: 5 INJECTION, SOLUTION INTRAVENOUS at 05:10

## 2022-10-18 RX ADMIN — GLYCOPYRROLATE 0.1 MG: 0.2 INJECTION, SOLUTION INTRAMUSCULAR; INTRAVITREAL at 05:10

## 2022-10-18 RX ADMIN — GLYCOPYRROLATE 0.1 MG: 0.2 INJECTION, SOLUTION INTRAMUSCULAR; INTRAVITREAL at 10:10

## 2022-10-18 RX ADMIN — DEXTROSE: 5 SOLUTION INTRAVENOUS at 10:10

## 2022-10-18 RX ADMIN — DEXTROSE: 5 SOLUTION INTRAVENOUS at 05:10

## 2022-10-18 NOTE — ASSESSMENT & PLAN NOTE
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 Encephalopathy secondary to infectious/ metabolic deficits.   -Neuro recs PT/OT/ST.  Pt not appropriate for PT/OT until today 10/17.    10/18 pending repeat SLP evaluation todayMPOA stated that the pt would not want a feeding tube of any kind

## 2022-10-18 NOTE — ASSESSMENT & PLAN NOTE
Heart rate controlled  Continuous cardiac monitoring  [id number]  Pt was on eliquis 5 mg bid, failed swallow study.  Start low intensity heparin drip.  Cards eval appreciated: continue IV Metoprolol for now; transition back to oral Metoprolol when able to tolerate po meds     Results for orders placed during the hospital encounter of 10/11/22    Echo    Interpretation Summary  · The left ventricle is normal in size with concentric remodeling and normal systolic function.  · The estimated ejection fraction is 55%.  · A diastolic pattern consistent with atrial fibrillation observed.  · Mild-to-moderate mitral regurgitation.  · There is mild aortic valve stenosis.  · Aortic valve area is 1.36 cm2; peak velocity is 2.16 m/s; mean gradient is 11 mmHg.  · Elevated central venous pressure (15 mmHg).  · The estimated PA systolic pressure is 45 mmHg.   · Mild tricuspid regurgitation.  · Normal right ventricular size with normal right ventricular systolic function.  · There is pulmonary hypertension.

## 2022-10-18 NOTE — PROGRESS NOTES
[facility]  [facility]  Progress Note    SUMMARY       Recommendations    Recommendation:  1. Await further SLP recs.   2. If TF desired, initiate TF of Isosource 1.5 at 10ml/hr and advance as tolerated to goal rate of 50ml/hr which would provide 1800 kcal, 81g protein, & 916ml free water.   3. Monitor weight/labs.   4. RD to follow to monitor nutrition status    Goals:  TF or diet will br started by RD follow up  Nutrition Goal Status:  (not met/continues)  Communication of RD Recs: reviewed with RN    Assessment and Plan  * Encephalopathy due to infection  Contributing Nutrition Diagnosis  Inadequate energy intake    Related to (etiology):   AMS    Signs and Symptoms (as evidenced by):   NPO per SLP recs    Interventions:  Collaboration with other providers    Nutrition Diagnosis Status:   Continues      Malnutrition Assessment  Unable to assess NFPE 2/2 pt confused       Reason for Assessment  Reason For Assessment: RD follow-up  Diagnosis:  (encephalopathy)  Relevant Medical History: CVA, PE, HTN, bipolar d/o  General Information Comments: Pt remains NPO per SLP recs. Noted family considering hospice. Geronimo 9-skin intact. No weight hx per chart. Unable to assess NFPE at visit 2/2 pt confused.  Nutrition Discharge Planning: d/c needs to be determined    Nutrition Risk Screen  Nutrition Risk Screen: dysphagia or difficulty swallowing    Nutrition/Diet History  Food Preferences: no Synagogue or cultural food prefs identified  Spiritual, Cultural Beliefs, Holiness Practices, Values that Affect Care: no  Factors Affecting Nutritional Intake: difficulty/impaired swallowing, NPO, impaired cognitive status/motor control    Anthropometrics  Temp: 97.1 °F (36.2 °C)  Height: 5' 7" (170.2 cm)  Height (inches): 67 in  Weight Method: Bed Scale  Weight: 66.2 kg (145 lb 15.1 oz)  Weight (lb): 145.95 lb  Ideal Body Weight (IBW), Male: 148 lb  % Ideal Body Weight, Male (lb): 98.61 %  BMI (Calculated): 22.9  BMI " Grade: 18.5-24.9 - normal     Lab/Procedures/Meds  Pertinent Labs Reviewed: reviewed  Pertinent Labs Comments: Glu 126H, Phos 2.3L, Alb 2.6L  Pertinent Medications Reviewed: reviewed  Pertinent Medications Comments: thiamine, 5% dex at 75, heparin    Estimated/Assessed Needs  Weight Used For Calorie Calculations: 66.2 kg (145 lb 15.1 oz)  Energy Calorie Requirements (kcal): 1986 (30 kcal/kg)  Energy Need Method: Kcal/kg  Protein Requirements: 66-79g (1.0-1.2g/kg)  Weight Used For Protein Calculations: 66.2 kg (145 lb 15.1 oz)  Estimated Fluid Requirement Method: RDA Method  RDA Method (mL): 1986     Nutrition Prescription Ordered  Current Diet Order: NPO    Evaluation of Received Nutrient/Fluid Intake  I/O: 0/1900  Energy Calories Required: not meeting needs  Protein Required: not meeting needs  Fluid Required: not meeting needs  Comments: LBM 10/10  % Intake of Estimated Energy Needs: Other: NPO  % Meal Intake: NPO    Nutrition Risk  Level of Risk/Frequency of Follow-up:  (2xweekly)     Monitor and Evaluation  Food and Nutrient Intake: energy intake  Food and Nutrient Adminstration: diet order, enteral and parenteral nutrition administration  Physical Activity and Function: nutrition-related ADLs and IADLs  Anthropometric Measurements: weight  Biochemical Data, Medical Tests and Procedures: electrolyte and renal panel  Nutrition-Focused Physical Findings: overall appearance     Nutrition Follow-Up  RD Follow-up?: Yes

## 2022-10-18 NOTE — PROGRESS NOTES
Nephrology Progress Note       Consult Requested By: Mike Woods MD  Reason for Consult: ALICIA Hypernatremia    SUBJECTIVE:          Review of Systems   Unable to perform ROS: Acuity of condition     No past medical history on file.  No past surgical history on file.  No family history on file.       Review of patient's allergies indicates:  No Known Allergies         OBJECTIVE:     Vital Signs (Most Recent)  Vitals:    10/18/22 0536 10/18/22 0812 10/18/22 1113 10/18/22 1156   BP:  (!) 158/99 132/69    BP Location:   Left arm    Patient Position:   Lying    Pulse: 101 89 61 82   Resp:  18 18    Temp:  97.7 °F (36.5 °C) 97.5 °F (36.4 °C)    TempSrc:  Axillary Axillary    SpO2:  (!) 92% 99%    Weight:       Height:                     Medications:   ceFEPime (MAXIPIME) IVPB  1 g Intravenous Q12H    glycopyrrolate (PF)  0.1 mg Intravenous Q4H    metoprolol  5 mg Intravenous Q6H    potassium phosphate IVPB  20 mmol Intravenous Once    thiamine (VITAMIN B1) IVPB  500 mg Intravenous TID           Physical Exam  Vitals and nursing note reviewed.   Constitutional:       General: He is not in acute distress.     Appearance: He is ill-appearing. He is not diaphoretic.   HENT:      Head: Normocephalic and atraumatic.      Mouth/Throat:      Pharynx: No oropharyngeal exudate.   Eyes:      General: No scleral icterus.     Conjunctiva/sclera: Conjunctivae normal.      Pupils: Pupils are equal, round, and reactive to light.   Cardiovascular:      Rate and Rhythm: Normal rate and regular rhythm.      Heart sounds: Normal heart sounds. No murmur heard.  Pulmonary:      Effort: No respiratory distress.      Breath sounds: Rhonchi present.   Abdominal:      General: Bowel sounds are normal. There is no distension.      Palpations: Abdomen is soft.      Tenderness: There is no abdominal tenderness.   Musculoskeletal:         General: Normal range of motion.      Cervical back: Normal range of motion and neck supple.   Skin:      General: Skin is warm and dry.      Findings: No erythema.   Neurological:      Mental Status: He is alert. He is disoriented and confused.      Cranial Nerves: No cranial nerve deficit.       Laboratory:  Recent Labs   Lab 10/16/22  0348 10/17/22  0424 10/18/22  0413   WBC 14.48* 12.36 10.49   HGB 10.9* 11.0* 11.5*   HCT 35.2* 35.7* 36.2*    297 286   MONO 7.2  1.0 5.8  0.7 6.4  0.7       Recent Labs   Lab 10/16/22  0348 10/17/22  0424 10/18/22  0414    140 138   K 4.1 3.8 3.7   * 113* 111*   CO2 18* 21* 18*   BUN 12 11 11   CREATININE 1.0 1.0 1.0   CALCIUM 9.1 9.5 9.4   PHOS 2.2* 2.3* 2.3*         Diagnostic Results:  X-Ray: Reviewed  US: Reviewed  Echo: Reviewed  ASSESSMENT/PLAN:     1. ALICIA/Hypernatremia/Hypercalcemia   -  Unknown baseline Cr 2.2 now improving  1.4  =>1.0 UA + for UTI On Abx   Hypokalemia and Hypomagnesemia   Replace Mg and K+ as needed   -- Cr improving but srill AMS most likely baseline    Need FW but fail swallowing study   Consider his Dementia Psych Hx placing OG/NG/PEG is not reasonable   Continue IV D5% Na+ improving Avoid NS, Vit D - for now   -- Palliative consulted pending Hospice transition    -- Continue D5 for now Na+ ALICIA better   Will sign off       With any question please call [phone]  Marietta Bautista MD    [facility]  BAUTISTA Mcneal MD, FACP,   RAMO Harley MD,   MD BLAYNE Adams MD E. V. Harmon, NP    [address]  [place], [address] [phone]

## 2022-10-18 NOTE — PLAN OF CARE
10/18/22 0935   Post-Acute Status   Post-Acute Authorization Placement;Hospice   Post-Acute Placement Status Pending post-acute provider review/more information requested  (Discussed with admission. Updated clinicals sent. Pending response. NH with Hospice orders requested to send to facility to review.)

## 2022-10-18 NOTE — SUBJECTIVE & OBJECTIVE
Interval History:  Patient with garbled speech this a.m..  1 or 2 words are clear.  I was able to make out that he understood it was the year 2022.  He needs a repeat speech eval.  His abdomen is tender this a.m..  142 documentation is complete.  He is pending hospice at [facility].  He will need 3 more days of antibiotics for UTI.      Review of Systems   Unable to perform ROS: Mental status change   Objective:     Vital Signs (Most Recent):  Temp: 97.7 °F (36.5 °C) (10/18/22 0812)  Pulse: 89 (10/18/22 0812)  Resp: 18 (10/18/22 0812)  BP: (!) 158/99 (10/18/22 0812)  SpO2: (!) 92 % (10/18/22 0812)   Vital Signs (24h Range):  Temp:  [97.1 °F (36.2 °C)-98 °F (36.7 °C)] 97.7 °F (36.5 °C)  Pulse:  [] 89  Resp:  [18-22] 18  SpO2:  [92 %-100 %] 92 %  BP: ()/(55-99) 158/99     Weight: 66.2 kg (145 lb 15.1 oz)  Body mass index is 22.86 kg/m².    Intake/Output Summary (Last 24 hours) at 10/18/2022 1104  Last data filed at 10/18/2022 0532  Gross per 24 hour   Intake 0 ml   Output 600 ml   Net -600 ml        Physical Exam  HENT:      Head: Normocephalic and atraumatic.      Mouth/Throat:      Pharynx: Oropharynx is clear.   Eyes:      Conjunctiva/sclera: Conjunctivae normal.   Cardiovascular:      Rate and Rhythm: Normal rate. Rhythm irregular.      Pulses: Normal pulses.   Pulmonary:      Effort: Pulmonary effort is normal. No respiratory distress.      Breath sounds: Normal breath sounds. No wheezing.   Abdominal:      General: Bowel sounds are normal.      Palpations: Abdomen is soft.      Tenderness: There is abdominal tenderness.   Musculoskeletal:         General: No deformity.      Cervical back: Neck supple.      Comments: Very weak grasp to bilateral hands   Skin:     General: Skin is warm and dry.   Neurological:      Mental Status: He is alert. He is disoriented.      GCS: GCS eye subscore is 4. GCS verbal subscore is 3. GCS motor subscore is 5.      Comments: At baseline described as  ""vegetative state" oriented to person and situation.  Pt close to baseline.       Significant Labs: All pertinent labs within the past 24 hours have been reviewed.  Microbiology Results (last 7 days)       Procedure Component Value Units Date/Time    Blood culture [[id number]] Collected: 10/11/22 2330    Order Status: Completed Specimen: Blood from Peripheral, Hand, Right Updated: 10/17/22 0612     Blood Culture, Routine No growth after 5 days.    Blood culture [[id number]] Collected: 10/11/22 2340    Order Status: Completed Specimen: Blood from Peripheral, Hand, Left Updated: 10/17/22 0612     Blood Culture, Routine No growth after 5 days.    Culture, Respiratory with Gram Stain [[id number]] Collected: 10/14/22 2223    Order Status: Completed Specimen: Respiratory from Sputum Updated: 10/15/22 2112     Respiratory Culture Specimen inadequate - culture not performed     Gram Stain (Respiratory) >10epis/lfp and <than many WBC's     Gram Stain (Respiratory) Predominance of oropharyngeal xochitl. Please recollect.    Urine culture [[id number]]  (Abnormal)  (Susceptibility) Collected: 10/11/22 2109    Order Status: Completed Specimen: Urine Updated: 10/15/22 1105     Urine Culture, Routine PROTEUS VULGARIS  10,000 - 49,999 cfu/ml      Narrative:      Specimen Source->Urine  Previous value was Presumptive Proteus species verified by 4KM at   14:22 on 10/14/2022  Previous quantitation was 50,000 - 99,999 cfu/ml verified by 4KM at   14:22 on 10/14/2022  Previous comment was modified by 4KM at  14:22  on  10/14/2022     Influenza A & B by Molecular [[id number]] Collected: 10/11/22 2110    Order Status: Completed Specimen: Nasopharyngeal Swab Updated: 10/11/22 2149     Influenza A, Molecular Negative     Influenza B, Molecular Negative     Flu A & B Source NP            Significant Imaging: I have reviewed all pertinent imaging results/findings within the past 24 hours.  "

## 2022-10-18 NOTE — ASSESSMENT & PLAN NOTE
HCAP (healthcare-associated pneumonia)  UTI (urinary tract infection), Hematuria  Leukocytosis    Continuous Cardiac monitoring  Trend troponin, trended down  Oxygen prn  duonebs prn  IVFs   Pt was started on macrobid outpatient on 10/11.  Continued IV ceftriaxone and azithro  -UC multiple organisms in predominance, called micro lab and spoke with Yo Huff, will look at plate again.  Update 10/14 gram negative rods and proteus, continue ceftriaxone and azithro.  10/15 Proteus vulgaris resistant to ceftriaxone, changed to cefepime with improvement in WBC and improvement in mental status on 10/17. Needs 3 more days abx   -US retroperitoneal: Probable blood products in the urinary bladder.  A mass is not entirely excluded, follow-up is recommended. No hydronephrosis.   BC NGTD  Psychiatry following and recommended Neurology consult, will place.  neurochecks   -failed swallow study x 2, ST seeing again 10/17, 10/18  -Vit B12 722 appropriate, Vit D deficiency see below  -Palliative Care consulted

## 2022-10-18 NOTE — ASSESSMENT & PLAN NOTE
Bipolar disorder    Transferred from [facility] due to medical issues  Stable  PEC on admission  Consult Psychiatry for continued management   Psych recommended d/c PEC

## 2022-10-18 NOTE — PROGRESS NOTES
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 Patient unable to provide any history. Per RN, patient normally oriented to self and now is somnolent.  ED findings:  WBCs 16.91, potassium 5.2, creatinine 2.2, troponin 0.120, EKG without ischemic changes, u/a concerning for infectious process,  chest x-ray showed a right basilar airspace opacity, covid pending, blood cultures pending.  Patient admitted to hospital medicine observation unit for further medical management.     Facility Medlist:  atorvastatin 80 mg qhs, finasteride 5 mg qhs, lisinopril 40 mg daily, melatonin 15 mg qhs, metoprolol ER 25 mg daily, MVI daily, spironolactone 25 mg qhs, thiamine 100 mg daily, duloxetine 40 mg daily, eliquis 5 mg bid daily, trazodone 25 mg daily, as needed mag hydroxide, as need maalox, as need tylenol 325 mg q 4 prn, as needed nitro      Overview/Hospital Course:  Pt admitted for AMS, pt sent from [facility].  PEC in place.  UA notable for infection, pt started on ceftriaxone.  Psychiatry consulted, PEC discontinued.  Noted baseline by caregiver described as "vegetative state" and at baseline oriented to person and situation.  It was recommended to hold lithium and haldol with ALICIA.  Depacon recommended however nationwide supply chain issue and no reasonable alternative.  Zyprexa prn agitation.  Folate/Thiamine/MVI.  RD consult as pt failed swallow " assessment.  Neurology consult for encephalopathy.  10/13 UC with multiple organisms none in predominance, micro lab will review plate again 10/13.  UC with proteus vulgaris resistant to ceftriaxone and changed to cefepime on 10/15.  Neurology consulted and suspect metabolic encephalopathy.  Nephrology agrees with D5 for treatment of hypernatremia with noted improvement.  ST consulted and recommended NPO.  Cardiology consulted for abnormal rhythm, telemetry reviewed and paced rhythm with underlying afib rate controlled with PVCs and bigeminy; no evidence of afib with RVR or VTach.  Continue IV Metoprolol for now with transition to oral BB at home dose when able to take po meds/fluids.  Palliative Care consulted and pt changed to DNR.      Interval History:  Patient with garbled speech this a.m..  1 or 2 words are clear.  I was able to make out that he understood it was the year 2022.  He needs a repeat speech eval.  His abdomen is tender this a.m..  142 documentation is complete.  He is pending hospice at [facility].  He will need 3 more days of antibiotics for UTI.      Review of Systems   Unable to perform ROS: Mental status change   Objective:     Vital Signs (Most Recent):  Temp: 97.7 °F (36.5 °C) (10/18/22 0812)  Pulse: 89 (10/18/22 0812)  Resp: 18 (10/18/22 0812)  BP: (!) 158/99 (10/18/22 0812)  SpO2: (!) 92 % (10/18/22 0812)   Vital Signs (24h Range):  Temp:  [97.1 °F (36.2 °C)-98 °F (36.7 °C)] 97.7 °F (36.5 °C)  Pulse:  [] 89  Resp:  [18-22] 18  SpO2:  [92 %-100 %] 92 %  BP: ()/(55-99) 158/99     Weight: 66.2 kg (145 lb 15.1 oz)  Body mass index is 22.86 kg/m².    Intake/Output Summary (Last 24 hours) at 10/18/2022 1104  Last data filed at 10/18/2022 0532  Gross per 24 hour   Intake 0 ml   Output 600 ml   Net -600 ml        Physical Exam  HENT:      Head: Normocephalic and atraumatic.      Mouth/Throat:      Pharynx: Oropharynx is clear.   Eyes:      Conjunctiva/sclera:  "Conjunctivae normal.   Cardiovascular:      Rate and Rhythm: Normal rate. Rhythm irregular.      Pulses: Normal pulses.   Pulmonary:      Effort: Pulmonary effort is normal. No respiratory distress.      Breath sounds: Normal breath sounds. No wheezing.   Abdominal:      General: Bowel sounds are normal.      Palpations: Abdomen is soft.      Tenderness: There is abdominal tenderness.   Musculoskeletal:         General: No deformity.      Cervical back: Neck supple.      Comments: Very weak grasp to bilateral hands   Skin:     General: Skin is warm and dry.   Neurological:      Mental Status: He is alert. He is disoriented.      GCS: GCS eye subscore is 4. GCS verbal subscore is 3. GCS motor subscore is 5.      Comments: At baseline described as "vegetative state" oriented to person and situation.  Pt close to baseline.       Significant Labs: All pertinent labs within the past 24 hours have been reviewed.  Microbiology Results (last 7 days)       Procedure Component Value Units Date/Time    Blood culture [[id number]] Collected: 10/11/22 2330    Order Status: Completed Specimen: Blood from Peripheral, Hand, Right Updated: 10/17/22 0612     Blood Culture, Routine No growth after 5 days.    Blood culture [[id number]] Collected: 10/11/22 2340    Order Status: Completed Specimen: Blood from Peripheral, Hand, Left Updated: 10/17/22 0612     Blood Culture, Routine No growth after 5 days.    Culture, Respiratory with Gram Stain [[id number]] Collected: 10/14/22 2223    Order Status: Completed Specimen: Respiratory from Sputum Updated: 10/15/22 2112     Respiratory Culture Specimen inadequate - culture not performed     Gram Stain (Respiratory) >10epis/lfp and <than many WBC's     Gram Stain (Respiratory) Predominance of oropharyngeal xochitl. Please recollect.    Urine culture [[id number]]  (Abnormal)  (Susceptibility) Collected: 10/11/22 2109    Order Status: Completed Specimen: Urine Updated: 10/15/22 1105     Urine Culture, " Routine PROTEUS VULGARIS  10,000 - 49,999 cfu/ml      Narrative:      Specimen Source->Urine  Previous value was Presumptive Proteus species verified by 4KM at   14:22 on 10/14/2022  Previous quantitation was 50,000 - 99,999 cfu/ml verified by 4KM at   14:22 on 10/14/2022  Previous comment was modified by 4KM at  14:22  on  10/14/2022     Influenza A & B by Molecular [[id number]] Collected: 10/11/22 2110    Order Status: Completed Specimen: Nasopharyngeal Swab Updated: 10/11/22 2149     Influenza A, Molecular Negative     Influenza B, Molecular Negative     Flu A & B Source NP            Significant Imaging: I have reviewed all pertinent imaging results/findings within the past 24 hours.      Assessment/Plan:      * Encephalopathy due to infection  HCAP (healthcare-associated pneumonia)  UTI (urinary tract infection), Hematuria  Leukocytosis    Continuous Cardiac monitoring  Trend troponin, trended down  Oxygen prn  duonebs prn  IVFs   Pt was started on macrobid outpatient on 10/11.  Continued IV ceftriaxone and azithro  -UC multiple organisms in predominance, called micro lab and spoke with Yo Huff, will look at plate again.  Update 10/14 gram negative rods and proteus, continue ceftriaxone and azithro.  10/15 Proteus vulgaris resistant to ceftriaxone, changed to cefepime with improvement in WBC and improvement in mental status on 10/17. Needs 3 more days abx   -US retroperitoneal: Probable blood products in the urinary bladder.  A mass is not entirely excluded, follow-up is recommended. No hydronephrosis.   BC NGTD  Psychiatry following and recommended Neurology consult, will place.  neurochecks   -failed swallow study x 2, ST seeing again 10/17, 10/18  -Vit B12 722 appropriate, Vit D deficiency see below  -Palliative Care consulted    Palliative care encounter        Goals of care, counseling/discussion  Consulting palliative care  Advance Care Planning     Date: 10/17/2022     Code Status  In light of the  patients advanced and life limiting illness,I engaged the the healthcare power of   in a conversation about the patient's preferences for care  at the very end of life. Per POA patient wishes to have a natural, peaceful death.  Along those lines, the patient does not wish to have CPR or other invasive treatments performed when his heart and/or breathing stops. I communicated to the healthcare power of   that a DNR order would be placed in his medical record to reflect this preference.   previous provider spent a total of 15 minutes engaging the patient in this advance care planning discussion.           Cardiac pacemaker in situ  Single chamber pacemaker 2020 due to syncope  Cards recs appreciated:  -paced rhythm with underlying afib rate controlled with PVCs and bigeminy; no evidence of afib with RVR or VTach   - monitor on telemetry while admitted  - continue IV Metoprolol for now with transition to oral BB at home dose when able to take po meds/fluids    Progressive supranuclear palsy  Mri brain with atrophy of midbrain with relative preservation of zoie suggesting Progressive Supranuclear palsy diagnosis. Encephalopathy secondary to infectious/ metabolic deficits.   -Neuro recs PT/OT/ST.  Pt not appropriate for PT/OT until today 10/17.    10/18 pending repeat SLP evaluation todayMPOA stated that the pt would not want a feeding tube of any kind    Hematuria        Vitamin D deficiency  Vit D 14, replace orally if able to pass swallow eval      Hypernatremia  Hypomagnesemia  Hypokalemia  Hypophosphatemia  Suspect hypovolemic hypernatremia however pt does have ALICIA and history of lithium use.  Will consult nephrology, possible diabetes insipidus  Lithium level 1.5 on 10/6/22, elevated  -Nephrology recs appreciated, agree with D5.  No NS.  Improved.  -Replacing electrolytes IV    CAD (coronary artery disease)  See above, resume statin once passes swallow assessment      Congestive heart  failure  Appears euvolemic  Continuous cardiac monitoring  Trend troponins, trended down  Facility medlist: atorvastatin 80 mg qhs, lisinopril 40 mg daily, metoprolol succinated ER 25 mg daily, spironolactone 25 mg daily    Results for orders placed during the hospital encounter of 10/11/22    Echo    Interpretation Summary  · The left ventricle is normal in size with concentric remodeling and normal systolic function.  · The estimated ejection fraction is 55%.  · A diastolic pattern consistent with atrial fibrillation observed.  · Mild-to-moderate mitral regurgitation.  · There is mild aortic valve stenosis.  · Aortic valve area is 1.36 cm2; peak velocity is 2.16 m/s; mean gradient is 11 mmHg.  · Elevated central venous pressure (15 mmHg).  · The estimated PA systolic pressure is 45 mmHg.  · Mild tricuspid regurgitation.  · Normal right ventricular size with normal right ventricular systolic function.  · There is pulmonary hypertension.      History of CVA (cerebrovascular accident)  Facility medlist: atorvastatin 80 mg qhs, finasteride 5 mg qhs, lisinopril 40 mg daily, melatonin 15 mg qhs, metoprolol ER 25 mg daily, MVI daily, spironolactone 25 mg qhs, thiamine 100 mg daily, duloxetine 40 mg daily, eliquis 5 mg bid daily, trazodone 25 mg daily, as needed mag hydroxide, as need maalox, as need tylenol 325 mg q 4 prn, as needed nitro  -failed swallow study, oral meds on hold  -heparin drip initiated      History of pulmonary embolism  No signs of respiratory distress  Monitor  Start low intensity heparin as failed swallow assessment, eliquis on hold      Hypertension  Stable, see medlist above      A-fib  Heart rate controlled  Continuous cardiac monitoring  [id number]  Pt was on eliquis 5 mg bid, failed swallow study.  Start low intensity heparin drip.  Cards eval appreciated: continue IV Metoprolol for now; transition back to oral Metoprolol when able to tolerate po meds     Results for orders placed during the  hospital encounter of 10/11/22    Echo    Interpretation Summary  · The left ventricle is normal in size with concentric remodeling and normal systolic function.  · The estimated ejection fraction is 55%.  · A diastolic pattern consistent with atrial fibrillation observed.  · Mild-to-moderate mitral regurgitation.  · There is mild aortic valve stenosis.  · Aortic valve area is 1.36 cm2; peak velocity is 2.16 m/s; mean gradient is 11 mmHg.  · Elevated central venous pressure (15 mmHg).  · The estimated PA systolic pressure is 45 mmHg.   · Mild tricuspid regurgitation.  · Normal right ventricular size with normal right ventricular systolic function.  · There is pulmonary hypertension.          Bipolar disorder        Suicidal ideation  Bipolar disorder    Transferred from [facility] due to medical issues  Stable  PEC on admission  Consult Psychiatry for continued management   Psych recommended d/c PEC      Leukocytosis        Elevated troponin  Continuous cardiac monitoring  ekg without ischemic changes   Troponin trended down      UTI (urinary tract infection)        HCAP (healthcare-associated pneumonia)          VTE Risk Mitigation (From admission, onward)         Ordered     heparin 25,000 units in dextrose 5% (100 units/ml) IV bolus from bag - ADDITIONAL PRN BOLUS - 60 units/kg  As needed (PRN)        Question:  Heparin Infusion Adjustment (DO NOT MODIFY ANSWER)  Answer:  \[email or web address]\Images\Pharmacy\HeparinInfusions\heparin LOW INTENSITY nomogram for [facility] [id number]    10/13/22 1326     heparin 25,000 units in dextrose 5% (100 units/ml) IV bolus from bag - ADDITIONAL PRN BOLUS - 30 units/kg  As needed (PRN)        Question:  Heparin Infusion Adjustment (DO NOT MODIFY ANSWER)  Answer:  \\[email or web address]\epic\Images\Pharmacy\HeparinInfusions\heparin LOW INTENSITY nomogram for [facility] [id number].pdf    10/13/22 1326     heparin 25,000 units in dextrose 5% 250 mL (100 units/mL) infusion LOW INTENSITY nomogram  - [facility]  Continuous        Question Answer Comment   Heparin Infusion Adjustment (DO NOT MODIFY ANSWER) [email or web address]\epic\Images\Pharmacy\HeparinInfusions\heparin LOW INTENSITY nomogram for [facility] [id number].pdf    Begin at (in units/kg/hr) 12        10/13/22 1326                Discharge Planning   JAYSON: 10/18/2022     Code Status: DNR   Is the patient medically ready for discharge?:     Reason for patient still in hospital (select all that apply): Patient trending condition  Discharge Plan A: Return to nursing home                  Radha Santos NP  Department of [facility] Medicine   [facility]

## 2022-10-18 NOTE — PLAN OF CARE
Recommendation:  1. Await further SLP recs.   2. If TF desired, initiate TF of Isosource 1.5 at 10ml/hr and advance as tolerated to goal rate of 50ml/hr which would provide 1800 kcal, 81g protein, & 916ml free water.   3. Monitor weight/labs.   4. RD to follow to monitor nutrition status    Goals:  TF or diet will br started by RD follow up  Nutrition Goal Status:  (not met/continues)

## 2022-10-19 LAB
ALBUMIN SERPL BCP-MCNC: 2.5 G/DL (ref 3.5–5.2)
ANION GAP SERPL CALC-SCNC: 8 MMOL/L (ref 8–16)
APTT BLDCRRT: 42.1 SEC (ref 21–32)
BASOPHILS # BLD AUTO: 0.03 K/UL (ref 0–0.2)
BASOPHILS NFR BLD: 0.2 % (ref 0–1.9)
BUN SERPL-MCNC: 11 MG/DL (ref 8–23)
CALCIUM SERPL-MCNC: 9.3 MG/DL (ref 8.7–10.5)
CHLORIDE SERPL-SCNC: 110 MMOL/L (ref 95–110)
CO2 SERPL-SCNC: 19 MMOL/L (ref 23–29)
CREAT SERPL-MCNC: 1 MG/DL (ref 0.5–1.4)
DIFFERENTIAL METHOD: ABNORMAL
EOSINOPHIL # BLD AUTO: 0.2 K/UL (ref 0–0.5)
EOSINOPHIL NFR BLD: 1.6 % (ref 0–8)
ERYTHROCYTE [DISTWIDTH] IN BLOOD BY AUTOMATED COUNT: 15 % (ref 11.5–14.5)
EST. GFR  (NO RACE VARIABLE): >60 ML/MIN/1.73 M^2
GLUCOSE SERPL-MCNC: 113 MG/DL (ref 70–110)
HCT VFR BLD AUTO: 32.6 % (ref 40–54)
HGB BLD-MCNC: 10.5 G/DL (ref 14–18)
IMM GRANULOCYTES # BLD AUTO: 0.06 K/UL (ref 0–0.04)
IMM GRANULOCYTES NFR BLD AUTO: 0.5 % (ref 0–0.5)
LYMPHOCYTES # BLD AUTO: 1.7 K/UL (ref 1–4.8)
LYMPHOCYTES NFR BLD: 14 % (ref 18–48)
MCH RBC QN AUTO: 32.4 PG (ref 27–31)
MCHC RBC AUTO-ENTMCNC: 32.2 G/DL (ref 32–36)
MCV RBC AUTO: 101 FL (ref 82–98)
MONOCYTES # BLD AUTO: 0.9 K/UL (ref 0.3–1)
MONOCYTES NFR BLD: 7.7 % (ref 4–15)
NEUTROPHILS # BLD AUTO: 9.2 K/UL (ref 1.8–7.7)
NEUTROPHILS NFR BLD: 76 % (ref 38–73)
NRBC BLD-RTO: 0 /100 WBC
PHOSPHATE SERPL-MCNC: 3.4 MG/DL (ref 2.7–4.5)
PLATELET # BLD AUTO: 264 K/UL (ref 150–450)
PMV BLD AUTO: 10.9 FL (ref 9.2–12.9)
POTASSIUM SERPL-SCNC: 3.7 MMOL/L (ref 3.5–5.1)
RBC # BLD AUTO: 3.24 M/UL (ref 4.6–6.2)
SODIUM SERPL-SCNC: 137 MMOL/L (ref 136–145)
WBC # BLD AUTO: 12.1 K/UL (ref 3.9–12.7)

## 2022-10-19 PROCEDURE — 63600175 PHARM REV CODE 636 W HCPCS: Performed by: PHYSICIAN ASSISTANT

## 2022-10-19 PROCEDURE — 36415 COLL VENOUS BLD VENIPUNCTURE: CPT | Performed by: INTERNAL MEDICINE

## 2022-10-19 PROCEDURE — 93010 EKG 12-LEAD: ICD-10-PCS | Mod: ,,, | Performed by: INTERNAL MEDICINE

## 2022-10-19 PROCEDURE — 85730 THROMBOPLASTIN TIME PARTIAL: CPT | Performed by: INTERNAL MEDICINE

## 2022-10-19 PROCEDURE — 25000003 PHARM REV CODE 250: Performed by: STUDENT IN AN ORGANIZED HEALTH CARE EDUCATION/TRAINING PROGRAM

## 2022-10-19 PROCEDURE — 80069 RENAL FUNCTION PANEL: CPT | Performed by: PHYSICIAN ASSISTANT

## 2022-10-19 PROCEDURE — 25000003 PHARM REV CODE 250: Performed by: PHYSICIAN ASSISTANT

## 2022-10-19 PROCEDURE — 93010 ELECTROCARDIOGRAM REPORT: CPT | Mod: ,,, | Performed by: INTERNAL MEDICINE

## 2022-10-19 PROCEDURE — 99233 PR SUBSEQUENT HOSPITAL CARE,LEVL III: ICD-10-PCS | Mod: ,,,

## 2022-10-19 PROCEDURE — 93005 ELECTROCARDIOGRAM TRACING: CPT

## 2022-10-19 PROCEDURE — 94761 N-INVAS EAR/PLS OXIMETRY MLT: CPT

## 2022-10-19 PROCEDURE — 85025 COMPLETE CBC W/AUTO DIFF WBC: CPT | Performed by: PHYSICIAN ASSISTANT

## 2022-10-19 PROCEDURE — 63600175 PHARM REV CODE 636 W HCPCS: Performed by: STUDENT IN AN ORGANIZED HEALTH CARE EDUCATION/TRAINING PROGRAM

## 2022-10-19 PROCEDURE — 99233 SBSQ HOSP IP/OBS HIGH 50: CPT | Mod: ,,,

## 2022-10-19 PROCEDURE — 99900035 HC TECH TIME PER 15 MIN (STAT)

## 2022-10-19 PROCEDURE — 11000001 HC ACUTE MED/SURG PRIVATE ROOM

## 2022-10-19 PROCEDURE — 99223 PR INITIAL HOSPITAL CARE,LEVL III: ICD-10-PCS | Mod: ,,, | Performed by: INTERNAL MEDICINE

## 2022-10-19 PROCEDURE — 99223 1ST HOSP IP/OBS HIGH 75: CPT | Mod: ,,, | Performed by: INTERNAL MEDICINE

## 2022-10-19 PROCEDURE — 92526 ORAL FUNCTION THERAPY: CPT

## 2022-10-19 RX ORDER — MORPHINE SULFATE 2 MG/ML
2 INJECTION, SOLUTION INTRAMUSCULAR; INTRAVENOUS ONCE
Status: COMPLETED | OUTPATIENT
Start: 2022-10-20 | End: 2022-10-19

## 2022-10-19 RX ADMIN — GLYCOPYRROLATE 0.1 MG: 0.2 INJECTION, SOLUTION INTRAMUSCULAR; INTRAVITREAL at 01:10

## 2022-10-19 RX ADMIN — THIAMINE HYDROCHLORIDE 500 MG: 100 INJECTION, SOLUTION INTRAMUSCULAR; INTRAVENOUS at 09:10

## 2022-10-19 RX ADMIN — THIAMINE HYDROCHLORIDE 500 MG: 100 INJECTION, SOLUTION INTRAMUSCULAR; INTRAVENOUS at 04:10

## 2022-10-19 RX ADMIN — THIAMINE HYDROCHLORIDE 500 MG: 100 INJECTION, SOLUTION INTRAMUSCULAR; INTRAVENOUS at 11:10

## 2022-10-19 RX ADMIN — DEXTROSE: 5 SOLUTION INTRAVENOUS at 09:10

## 2022-10-19 RX ADMIN — GLYCOPYRROLATE 0.1 MG: 0.2 INJECTION, SOLUTION INTRAMUSCULAR; INTRAVITREAL at 05:10

## 2022-10-19 RX ADMIN — CEFEPIME 1 G: 1 INJECTION, POWDER, FOR SOLUTION INTRAMUSCULAR; INTRAVENOUS at 01:10

## 2022-10-19 RX ADMIN — GLYCOPYRROLATE 0.1 MG: 0.2 INJECTION, SOLUTION INTRAMUSCULAR; INTRAVITREAL at 10:10

## 2022-10-19 RX ADMIN — GLYCOPYRROLATE 0.1 MG: 0.2 INJECTION, SOLUTION INTRAMUSCULAR; INTRAVITREAL at 09:10

## 2022-10-19 RX ADMIN — METOROPROLOL TARTRATE 5 MG: 5 INJECTION, SOLUTION INTRAVENOUS at 11:10

## 2022-10-19 RX ADMIN — HEPARIN SODIUM 9 UNITS/KG/HR: 10000 INJECTION, SOLUTION INTRAVENOUS at 11:10

## 2022-10-19 RX ADMIN — MORPHINE SULFATE 2 MG: 2 INJECTION, SOLUTION INTRAMUSCULAR; INTRAVENOUS at 11:10

## 2022-10-19 NOTE — PROGRESS NOTES
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 Patient unable to provide any history. Per RN, patient normally oriented to self and now is somnolent.  ED findings:  WBCs 16.91, potassium 5.2, creatinine 2.2, troponin 0.120, EKG without ischemic changes, u/a concerning for infectious process,  chest x-ray showed a right basilar airspace opacity, covid pending, blood cultures pending.  Patient admitted to hospital medicine observation unit for further medical management.     Facility Medlist:  atorvastatin 80 mg qhs, finasteride 5 mg qhs, lisinopril 40 mg daily, melatonin 15 mg qhs, metoprolol ER 25 mg daily, MVI daily, spironolactone 25 mg qhs, thiamine 100 mg daily, duloxetine 40 mg daily, eliquis 5 mg bid daily, trazodone 25 mg daily, as needed mag hydroxide, as need maalox, as need tylenol 325 mg q 4 prn, as needed nitro      Overview/Hospital Course:  Pt admitted for AMS, pt sent from [facility].  PEC in place.  UA notable for infection, pt started on ceftriaxone.  Psychiatry consulted, PEC discontinued.  Noted baseline by caregiver described as "vegetative state" and at baseline oriented to person and situation.  It was recommended to hold lithium and haldol with ALICIA.  Depacon recommended however nationwide supply chain issue and no reasonable alternative.  Zyprexa prn agitation.  Folate/Thiamine/MVI.  RD consult as pt failed swallow " assessment.  Neurology consult for encephalopathy.  10/13 UC with multiple organisms none in predominance, micro lab will review plate again 10/13.  UC with proteus vulgaris resistant to ceftriaxone and changed to cefepime on 10/15.  Neurology consulted and suspect metabolic encephalopathy.  Nephrology agrees with D5 for treatment of hypernatremia with noted improvement.  ST consulted and recommended NPO.  Cardiology consulted for abnormal rhythm, telemetry reviewed and paced rhythm with underlying afib rate controlled with PVCs and bigeminy; no evidence of afib with RVR or VTach.  Continue IV Metoprolol for now with transition to oral BB at home dose when able to take po meds/fluids.  Palliative Care consulted and pt changed to DNR.      Interval History:  Patient with clearer speech this am.   142 documentation is complete.  He is pending hospice at [facility].  He will need 2 more days of antibiotics for UTI. Patient and POA now want PEG tube. SLP and palliative care recommending against PEG tube. I explained in detail risks and benefits of PEG to POMAGDALENE Greene specifically as defined by SLP evaluation today. He voiced understanding. He still wants to consult GI and make the final decision with GI.       Review of Systems   Unable to perform ROS: Mental status change   Objective:     Vital Signs (Most Recent):  Temp: 98.4 °F (36.9 °C) (10/19/22 0800)  Pulse: 87 (10/19/22 0800)  Resp: 18 (10/19/22 0800)  BP: 136/70 (10/19/22 0800)  SpO2: 99 % (10/19/22 0800)   Vital Signs (24h Range):  Temp:  [96.3 °F (35.7 °C)-98.4 °F (36.9 °C)] 98.4 °F (36.9 °C)  Pulse:  [61-87] 87  Resp:  [16-20] 18  SpO2:  [95 %-99 %] 99 %  BP: ()/(62-82) 136/70     Weight: 61.6 kg (135 lb 12.9 oz)  Body mass index is 21.27 kg/m².    Intake/Output Summary (Last 24 hours) at 10/19/2022 1110  Last data filed at 10/19/2022 0520  Gross per 24 hour   Intake 9147.16 ml   Output 975 ml   Net 8172.16 ml        Physical Exam  HENT:       Head: Normocephalic and atraumatic.      Mouth/Throat:      Pharynx: Oropharynx is clear.   Eyes:      Conjunctiva/sclera: Conjunctivae normal.   Cardiovascular:      Rate and Rhythm: Normal rate. Rhythm irregular.      Pulses: Normal pulses.   Pulmonary:      Effort: Pulmonary effort is normal. No respiratory distress.      Breath sounds: Normal breath sounds. No wheezing.   Abdominal:      General: Bowel sounds are normal.      Palpations: Abdomen is soft.      Tenderness: There is no abdominal tenderness.   Musculoskeletal:         General: No deformity.      Cervical back: Neck supple.      Comments: Very weak grasp to bilateral hands   Skin:     General: Skin is warm and dry.   Neurological:      Mental Status: He is alert.      GCS: GCS eye subscore is 4. GCS verbal subscore is 5. GCS motor subscore is 6.      Motor: Weakness present.      Comments: Clearer speech today. Seems to understand and have near normal conversation. Speech garbled by secretions. Delayed responses noted.        Significant Labs: All pertinent labs within the past 24 hours have been reviewed.  Microbiology Results (last 7 days)       Procedure Component Value Units Date/Time    Blood culture [[id number]] Collected: 10/11/22 2330    Order Status: Completed Specimen: Blood from Peripheral, Hand, Right Updated: 10/17/22 0612     Blood Culture, Routine No growth after 5 days.    Blood culture [[id number]] Collected: 10/11/22 2340    Order Status: Completed Specimen: Blood from Peripheral, Hand, Left Updated: 10/17/22 0612     Blood Culture, Routine No growth after 5 days.    Culture, Respiratory with Gram Stain [[id number]] Collected: 10/14/22 2223    Order Status: Completed Specimen: Respiratory from Sputum Updated: 10/15/22 2112     Respiratory Culture Specimen inadequate - culture not performed     Gram Stain (Respiratory) >10epis/lfp and <than many WBC's     Gram Stain (Respiratory) Predominance of oropharyngeal xochitl. Please  recollect.    Urine culture [[id number]]  (Abnormal)  (Susceptibility) Collected: 10/11/22 2109    Order Status: Completed Specimen: Urine Updated: 10/15/22 1105     Urine Culture, Routine PROTEUS VULGARIS  10,000 - 49,999 cfu/ml      Narrative:      Specimen Source->Urine  Previous value was Presumptive Proteus species verified by 4KM at   14:22 on 10/14/2022  Previous quantitation was 50,000 - 99,999 cfu/ml verified by 4KM at   14:22 on 10/14/2022  Previous comment was modified by 4KM at  14:22  on  10/14/2022             Significant Imaging: I have reviewed all pertinent imaging results/findings within the past 24 hours.      Assessment/Plan:      * Encephalopathy due to infection  HCAP (healthcare-associated pneumonia)  UTI (urinary tract infection), Hematuria  Leukocytosis    Continuous Cardiac monitoring  Trend troponin, trended down  Oxygen prn  duonebs prn  IVFs   Pt was started on macrobid outpatient on 10/11.  Continued IV ceftriaxone and azithro  -UC multiple organisms in predominance, called micro lab and spoke with Yo Huff, will look at plate again.  Update 10/14 gram negative rods and proteus, continue ceftriaxone and azithro.  10/15 Proteus vulgaris resistant to ceftriaxone, changed to cefepime with improvement in WBC and improvement in mental status on 10/17. Needs 2 more days abx   -US retroperitoneal: Probable blood products in the urinary bladder.  A mass is not entirely excluded, follow-up is recommended. No hydronephrosis.   BC NGTD  Psychiatry following and recommended Neurology consult, will place.  neurochecks   -failed swallow study x 2, ST seeing again 10/17, 10/18  -Vit B12 722 appropriate, Vit D deficiency see below  -Palliative Care consulted    10/19  SLP recommends NPO   On anticholinergic for oral secretions   Patient and POA would like PEG tube  SLP and palliative care recommends against PEG tube   POA would like to make final decision with GI regarding PEG, understanding risks  and benefits- see interval hx   GI consulted   Awaiting hospice/NH placement       Palliative care encounter        Goals of care, counseling/discussion  Consulting palliative care  Advance Care Planning     Date: 10/17/2022     Code Status  In light of the patients advanced and life limiting illness,I engaged the the healthcare power of   in a conversation about the patient's preferences for care  at the very end of life. Per POA patient wishes to have a natural, peaceful death.  Along those lines, the patient does not wish to have CPR or other invasive treatments performed when his heart and/or breathing stops. I communicated to the healthcare power of   that a DNR order would be placed in his medical record to reflect this preference.   previous provider spent a total of 15 minutes engaging the patient in this advance care planning discussion.           Cardiac pacemaker in situ  Single chamber pacemaker 2020 due to syncope  Cards recs appreciated:  -paced rhythm with underlying afib rate controlled with PVCs and bigeminy; no evidence of afib with RVR or VTach   - monitor on telemetry while admitted  - continue IV Metoprolol for now with transition to oral BB at home dose when able to take po meds/fluids    Progressive supranuclear palsy  Mri brain with atrophy of midbrain with relative preservation of zoie suggesting Progressive Supranuclear palsy diagnosis. Encephalopathy secondary to infectious/ metabolic deficits.   -Neuro recs PT/OT/ST.  Pt not appropriate for PT/OT until today 10/17.      Hematuria        Vitamin D deficiency  Vit D 14, supplement held       Hypernatremia  Hypomagnesemia  Hypokalemia  Hypophosphatemia  Suspect hypovolemic hypernatremia however pt does have ALICIA and history of lithium use.  Will consult nephrology, possible diabetes insipidus  Lithium level 1.5 on 10/6/22, elevated  -Nephrology recs appreciated, agree with D5.  No NS.  Improved.  -Replacing electrolytes  IV    CAD (coronary artery disease)  Statin held.   NPO       Congestive heart failure  Appears euvolemic  Continuous cardiac monitoring  Trend troponins, trended down  Facility medlist: atorvastatin 80 mg qhs, lisinopril 40 mg daily, metoprolol succinated ER 25 mg daily, spironolactone 25 mg daily    Results for orders placed during the hospital encounter of 10/11/22    Echo    Interpretation Summary  · The left ventricle is normal in size with concentric remodeling and normal systolic function.  · The estimated ejection fraction is 55%.  · A diastolic pattern consistent with atrial fibrillation observed.  · Mild-to-moderate mitral regurgitation.  · There is mild aortic valve stenosis.  · Aortic valve area is 1.36 cm2; peak velocity is 2.16 m/s; mean gradient is 11 mmHg.  · Elevated central venous pressure (15 mmHg).  · The estimated PA systolic pressure is 45 mmHg.  · Mild tricuspid regurgitation.  · Normal right ventricular size with normal right ventricular systolic function.  · There is pulmonary hypertension.      History of CVA (cerebrovascular accident)  Facility medlist: atorvastatin 80 mg qhs, finasteride 5 mg qhs, lisinopril 40 mg daily, melatonin 15 mg qhs, metoprolol ER 25 mg daily, MVI daily, spironolactone 25 mg qhs, thiamine 100 mg daily, duloxetine 40 mg daily, eliquis 5 mg bid daily, trazodone 25 mg daily, as needed mag hydroxide, as need maalox, as need tylenol 325 mg q 4 prn, as needed nitro  -failed swallow study, oral meds on hold  -heparin drip initiated      History of pulmonary embolism  No signs of respiratory distress  Monitor  Start low intensity heparin as failed swallow assessment, eliquis on hold      Hypertension  Stable, see medlist above      A-fib  Heart rate controlled  Continuous cardiac monitoring  [id number]  Pt was on eliquis 5 mg bid, failed swallow study.  Start low intensity heparin drip.  Cards eval appreciated: continue IV Metoprolol for now; transition back to oral  Metoprolol when able to tolerate po meds     Results for orders placed during the hospital encounter of 10/11/22    Echo    Interpretation Summary  · The left ventricle is normal in size with concentric remodeling and normal systolic function.  · The estimated ejection fraction is 55%.  · A diastolic pattern consistent with atrial fibrillation observed.  · Mild-to-moderate mitral regurgitation.  · There is mild aortic valve stenosis.  · Aortic valve area is 1.36 cm2; peak velocity is 2.16 m/s; mean gradient is 11 mmHg.  · Elevated central venous pressure (15 mmHg).  · The estimated PA systolic pressure is 45 mmHg.   · Mild tricuspid regurgitation.  · Normal right ventricular size with normal right ventricular systolic function.  · There is pulmonary hypertension.          Bipolar disorder        Suicidal ideation  Bipolar disorder    Transferred from [facility] due to medical issues  Stable  PEC on admission  Consult Psychiatry for continued management   Psych recommended d/c PEC      Leukocytosis        Elevated troponin  Continuous cardiac monitoring  ekg without ischemic changes   Troponin trended down      UTI (urinary tract infection)        HCAP (healthcare-associated pneumonia)          VTE Risk Mitigation (From admission, onward)         Ordered     heparin 25,000 units in dextrose 5% (100 units/ml) IV bolus from bag - ADDITIONAL PRN BOLUS - 60 units/kg  As needed (PRN)        Question:  Heparin Infusion Adjustment (DO NOT MODIFY ANSWER)  Answer:  \\[email or web address]\Images\Pharmacy\HeparinInfusions\heparin LOW INTENSITY nomogram for [facility] [id number]    10/13/22 1326     heparin 25,000 units in dextrose 5% (100 units/ml) IV bolus from bag - ADDITIONAL PRN BOLUS - 30 units/kg  As needed (PRN)        Question:  Heparin Infusion Adjustment (DO NOT MODIFY ANSWER)  Answer:  \\[email or web address]\Images\Pharmacy\HeparinInfusions\heparin LOW INTENSITY nomogram for [facility] [id number].pdf    10/13/22 1326     heparin  25,000 units in dextrose 5% 250 mL (100 units/mL) infusion LOW INTENSITY nomogram - [facility]  Continuous        Question Answer Comment   Heparin Infusion Adjustment (DO NOT MODIFY ANSWER) \\[email or web address]\epic\Images\Pharmacy\HeparinInfusions\heparin LOW INTENSITY nomogram for [facility] [id number]    Begin at (in units/kg/hr) 12        10/13/22 1326                Discharge Planning   JAYSON: 10/18/2022     Code Status: DNR   Is the patient medically ready for discharge?:     Reason for patient still in hospital (select all that apply): Patient trending condition  Discharge Plan A: Return to nursing home                  Radha Santos NP  Department of Hospital Medicine   [facility]

## 2022-10-19 NOTE — ASSESSMENT & PLAN NOTE
Long discussion with multiple providers as well as LORI willams confirms that patient would not want PEG, given his prior discussions before health decline with the patient.    He agreed that he does not want to pursue PEG for patient.    Agree with hopsice.    Call us back if something changes.

## 2022-10-19 NOTE — PLAN OF CARE
Contacted LORI Melgar [phone].  He would like to speak with GI regarding peg placement.  Dr. Herbert notified and will contact Kya Talia this afternoon.       10/19/22 1250   Post-Acute Status   Post-Acute Authorization Hospice   Discharge Plan   Discharge Plan A Hospice/home   Discharge Plan B Return to Nursing Home     Mason Macdonald RN   Supervisor Case Management[facility]  [phone]

## 2022-10-19 NOTE — ASSESSMENT & PLAN NOTE
Consulting palliative care  Advance Care Planning     Date: 10/17/2022     Code Status  In light of the patients advanced and life limiting illness,I engaged the the healthcare power of   in a conversation about the patient's preferences for care  at the very end of life. Per POA patient wishes to have a natural, peaceful death.  Along those lines, the patient does not wish to have CPR or other invasive treatments performed when his heart and/or breathing stops. I communicated to the healthcare power of   that a DNR order would be placed in his medical record to reflect this preference.   previous provider spent a total of 15 minutes engaging the patient in this advance care planning discussion.

## 2022-10-19 NOTE — PROGRESS NOTES
"[facility]  Adult [facility]  Progress Note    SUMMARY       Recommendations    Recommendation:  1. Await family decision on PEG/hospice.   2. If TF desired, initiate TF of Isosource 1.5 at 10ml/hr and advance as tolerated to goal rate of 50ml/hr which would provide 1800 kcal, 81g protein, & 916ml free water.   3. Monitor weight/labs.   4. RD to continue to follow to monitor nutrition status    Goals:  TF or diet will br started by RD follow up  Nutrition Goal Status:  (not met/continues)  Communication of RD Recs: reviewed with RN    Assessment and Plan  * Encephalopathy due to infection  Contributing Nutrition Diagnosis  Inadequate energy intake    Related to (etiology):   AMS    Signs and Symptoms (as evidenced by):   NPO per SLP recs    Interventions:  Collaboration with other providers    Nutrition Diagnosis Status:   Continues      Malnutrition Assessment  Weight Loss (Malnutrition): greater than 2% in 1 week (7% since admit)      Reason for Assessment  Reason For Assessment: RD follow-up  Diagnosis:  (encephalopathy)  Relevant Medical History: CVA, PE, HTN, bipolar d/o  General Information Comments: Pt remains NPO at this time per SLP eval/recs. Family condsidering PEG tube/hospice. Geronimo 12- skin intact. Noted 11lb weight loss since admit. Unable to assess NFPE at visit.  Nutrition Discharge Planning: d/c needs to be determined    Nutrition Risk Screen  Nutrition Risk Screen: dysphagia or difficulty swallowing    Nutrition/Diet History  Food Preferences: no Protestant or cultural food prefs identified  Spiritual, Cultural Beliefs, Jain Practices, Values that Affect Care: no  Factors Affecting Nutritional Intake: difficulty/impaired swallowing, NPO, impaired cognitive status/motor control    Anthropometrics  Temp: 98.2 °F (36.8 °C)  Height: 5' 7" (170.2 cm)  Height (inches): 67 in  Weight Method: Bed Scale  Weight: 61.6 kg (135 lb 12.9 oz)  Weight (lb): 135.8 lb  Ideal Body Weight (IBW), Male: 148 " lb  % Ideal Body Weight, Male (lb): 91.76 %  BMI (Calculated): 21.3  BMI Grade: 18.5-24.9 - normal  Usual Body Weight (UBW), k.2 kg (10/11)  % Usual Body Weight: 93.25  % Weight Change From Usual Weight: -6.95 %     Lab/Procedures/Meds  Pertinent Labs Reviewed: reviewed  Pertinent Labs Comments: Glu 113H, Alb 2.5L  Pertinent Medications Reviewed: reviewed  Pertinent Medications Comments: thiamine, 5% dex at 75, heparin    Estimated/Assessed Needs  Weight Used For Calorie Calculations: 61.6 kg (135 lb 12.9 oz)  Energy Calorie Requirements (kcal): 1848 (30 kcal/kg)  Energy Need Method: Kcal/kg  Protein Requirements: 73g (1.2g/kg)  Weight Used For Protein Calculations: 61.6 kg (135 lb 12.9 oz)  Estimated Fluid Requirement Method: RDA Method  RDA Method (mL): 1848     Nutrition Prescription Ordered  Current Diet Order: NPO    Evaluation of Received Nutrient/Fluid Intake  Other Calories (kcal): 306 (5% dex)  % Kcal Needs: 16  I/O: 9147/975  Energy Calories Required: not meeting needs  Protein Required: not meeting needs  Fluid Required: not meeting needs  Comments: LBM 10/19  % Intake of Estimated Energy Needs: 0 - 25 %  % Meal Intake: NPO    Nutrition Risk  Level of Risk/Frequency of Follow-up:  (2xweekly)     Monitor and Evaluation  Food and Nutrient Intake: energy intake  Food and Nutrient Adminstration: diet order, enteral and parenteral nutrition administration  Physical Activity and Function: nutrition-related ADLs and IADLs  Anthropometric Measurements: weight  Biochemical Data, Medical Tests and Procedures: electrolyte and renal panel  Nutrition-Focused Physical Findings: overall appearance     Nutrition Follow-Up    RD Follow-up?: Yes

## 2022-10-19 NOTE — CONSULTS
[facility] - Telemetry  Gastroenterology  Consult Note    Patient Name: Cheikh Yanez  MRN: [id number]  Admission Date: 10/11/2022  Hospital Length of Stay: 7 days  Code Status: DNR   Attending Provider: Mike Woods MD   Consulting Provider: Shashi Herbert MD  Primary Care Physician: Primary Doctor No  Principal Problem:Encephalopathy due to infection    Inpatient consult to Gastroenterology-[facility]  Consult performed by: Shashi Herbert MD  Consult ordered by: Radha Santos NP        Subjective:     HPI:  This is 80 y/o male here from behavioral [facility] for [facility]  Gi consulted for peg consideration  Unable to provide history thus obtained from mutliple medical providers, as well as POA who was fried and best man in wedding over phone    Non interactive , does open eyes, does not converse with me  Abd soft, no reported abd pain    Long discussion with POA over phone екатерина wong  [phone]    He confirms that patient had stated in past he would not want any invasive measures if he were to decline in health. He indcated to me that patient told POA that he was ready to go if his time came.   He confirmed that patient would not want to pursue PEG in this case.        No past medical history on file.    No past surgical history on file.    Review of patient's allergies indicates:  No Known Allergies  Family History    None       Tobacco Use    Smoking status: Not on file    Smokeless tobacco: Not on file   Substance and Sexual Activity    Alcohol use: Not on file    Drug use: Not on file    Sexual activity: Not on file     Review of Systems   Unable to perform ROS: Mental status change   Objective:     Vital Signs (Most Recent):  Temp: 98.2 °F (36.8 °C) (10/19/22 1141)  Pulse: 65 (10/19/22 1207)  Resp: 18 (10/19/22 1141)  BP: 100/60 (10/19/22 1147)  SpO2: 98 % (10/19/22 1147) Vital Signs (24h Range):  Temp:  [96.3 °F (35.7 °C)-98.4 °F (36.9 °C)] 98.2 °F (36.8 °C)  Pulse:  [63-87] 65  Resp:  [16-20] 18  SpO2:   [95 %-100 %] 98 %  BP: ()/(60-70) 100/60     Weight: 61.6 kg (135 lb 12.9 oz) (10/19/22 1244)  Body mass index is 21.27 kg/m².      Intake/Output Summary (Last 24 hours) at 10/19/2022 1607  Last data filed at 10/19/2022 0520  Gross per 24 hour   Intake 9147.16 ml   Output 975 ml   Net 8172.16 ml       Lines/Drains/Airways       Drain  Duration             Male External Urinary Catheter 10/13/22 0830 Small 6 days              Peripheral Intravenous Line  Duration                  Peripheral IV - Single Lumen 10/13/22 0930 Anterior;Left Forearm 6 days         Peripheral IV - Single Lumen 10/15/22 1000 22 G Left;Posterior Hand 4 days         Peripheral IV - Single Lumen 10/15/22 1359 22 G Anterior;Proximal;Right Upper Arm 4 days                    Physical Exam  Vitals reviewed.   Constitutional:       Appearance: He is ill-appearing.   HENT:      Head: Normocephalic and atraumatic.   Eyes:      General: No scleral icterus.     Conjunctiva/sclera: Conjunctivae normal.   Cardiovascular:      Rate and Rhythm: Normal rate.   Pulmonary:      Effort: Pulmonary effort is normal.      Breath sounds: Normal breath sounds.   Abdominal:      General: There is no distension.      Palpations: Abdomen is soft.   Musculoskeletal:         General: No swelling or tenderness.      Cervical back: Neck supple.   Lymphadenopathy:      Cervical: No cervical adenopathy.   Neurological:      Mental Status: He is disoriented.   Psychiatric:      Comments: Unable to asses       Significant Labs:  CBC:   Recent Labs   Lab 10/18/22  0413 10/19/22  0301   WBC 10.49 12.10   HGB 11.5* 10.5*   HCT 36.2* 32.6*    264     BMP:   Recent Labs   Lab 10/19/22  0301   *      K 3.7      CO2 19*   BUN 11   CREATININE 1.0   CALCIUM 9.3     CMP:   Recent Labs   Lab 10/19/22  0301   *   CALCIUM 9.3   ALBUMIN 2.5*      K 3.7   CO2 19*      BUN 11   CREATININE 1.0     Coagulation:   Recent Labs   Lab  10/19/22  0301   APTT 42.1*       Significant Imaging:  Imaging results within the past 24 hours have been reviewed.    Assessment/Plan:     * Encephalopathy due to infection  Long discussion with multiple providers as well as LORI willams confirms that patient would not want PEG, given his prior discussions before health decline with the patient.    He agreed that he does not want to pursue PEG for patient.    Agree with hopsice.    Call us back if something changes.        Thank you for your consult. I will sign off. Please contact us if you have any additional questions.    Shashi Herbert MD  Gastroenterology  [facility]    A total of 68minutes were spent during this encounter including reviewing records, interviewing, examining patient, and counseling / coordination of care.

## 2022-10-19 NOTE — ASSESSMENT & PLAN NOTE
Appears euvolemic  Continuous cardiac monitoring  Trend troponins, trended down  Facility medlist: atorvastatin 80 mg qhs, lisinopril 40 mg daily, metoprolol succinated ER 25 mg daily, spironolactone 25 mg daily    Results for orders placed during the hospital encounter of 10/11/22    Echo    Interpretation Summary  · The left ventricle is normal in size with concentric remodeling and normal systolic function.  · The estimated ejection fraction is 55%.  · A diastolic pattern consistent with atrial fibrillation observed.  · Mild-to-moderate mitral regurgitation.  · There is mild aortic valve stenosis.  · Aortic valve area is 1.36 cm2; peak velocity is 2.16 m/s; mean gradient is 11 mmHg.  · Elevated central venous pressure (15 mmHg).  · The estimated PA systolic pressure is 45 mmHg.  · Mild tricuspid regurgitation.  · Normal right ventricular size with normal right ventricular systolic function.  · There is pulmonary hypertension.     Bladder non-tender and non-distended. Urine clear yellow.

## 2022-10-19 NOTE — ASSESSMENT & PLAN NOTE
HCAP (healthcare-associated pneumonia)  UTI (urinary tract infection), Hematuria  Leukocytosis    Continuous Cardiac monitoring  Trend troponin, trended down  Oxygen prn  duonebs prn  IVFs   Pt was started on macrobid outpatient on 10/11.  Continued IV ceftriaxone and azithro  -UC multiple organisms in predominance, called micro lab and spoke with Yo Huff, will look at plate again.  Update 10/14 gram negative rods and proteus, continue ceftriaxone and azithro.  10/15 Proteus vulgaris resistant to ceftriaxone, changed to cefepime with improvement in WBC and improvement in mental status on 10/17. Needs 2 more days abx   -US retroperitoneal: Probable blood products in the urinary bladder.  A mass is not entirely excluded, follow-up is recommended. No hydronephrosis.   BC NGTD  Psychiatry following and recommended Neurology consult, will place.  neurochecks   -failed swallow study x 2, ST seeing again 10/17, 10/18  -Vit B12 722 appropriate, Vit D deficiency see below  -Palliative Care consulted    10/19  SLP recommends NPO   On anticholinergic for oral secretions   Patient and POA would like PEG tube  SLP and palliative care recommends against PEG tube   POA would like to make final decision with GI regarding PEG, understanding risks and benefits- see interval hx   GI consulted   Awaiting hospice/NH placement

## 2022-10-19 NOTE — SUBJECTIVE & OBJECTIVE
Interval History:  Patient with clearer speech this am.   142 documentation is complete.  He is pending hospice at [facility].  He will need 2 more days of antibiotics for UTI. Patient and POA now want PEG tube. SLP and palliative care recommending against PEG tube. I explained in detail risks and benefits of PEG in detail to LORI Greene specifically as defined by SLP evaluation today. He voiced understanding. He still wants to consult GI and make the final decision with GI.       Review of Systems   Unable to perform ROS: Mental status change   Objective:     Vital Signs (Most Recent):  Temp: 98.4 °F (36.9 °C) (10/19/22 0800)  Pulse: 87 (10/19/22 0800)  Resp: 18 (10/19/22 0800)  BP: 136/70 (10/19/22 0800)  SpO2: 99 % (10/19/22 0800)   Vital Signs (24h Range):  Temp:  [96.3 °F (35.7 °C)-98.4 °F (36.9 °C)] 98.4 °F (36.9 °C)  Pulse:  [61-87] 87  Resp:  [16-20] 18  SpO2:  [95 %-99 %] 99 %  BP: ()/(62-82) 136/70     Weight: 61.6 kg (135 lb 12.9 oz)  Body mass index is 21.27 kg/m².    Intake/Output Summary (Last 24 hours) at 10/19/2022 1110  Last data filed at 10/19/2022 0520  Gross per 24 hour   Intake 9147.16 ml   Output 975 ml   Net 8172.16 ml        Physical Exam  HENT:      Head: Normocephalic and atraumatic.      Mouth/Throat:      Pharynx: Oropharynx is clear.   Eyes:      Conjunctiva/sclera: Conjunctivae normal.   Cardiovascular:      Rate and Rhythm: Normal rate. Rhythm irregular.      Pulses: Normal pulses.   Pulmonary:      Effort: Pulmonary effort is normal. No respiratory distress.      Breath sounds: Normal breath sounds. No wheezing.   Abdominal:      General: Bowel sounds are normal.      Palpations: Abdomen is soft.      Tenderness: There is no abdominal tenderness.   Musculoskeletal:         General: No deformity.      Cervical back: Neck supple.      Comments: Very weak grasp to bilateral hands   Skin:     General: Skin is warm and dry.   Neurological:      Mental Status: He is  alert.      GCS: GCS eye subscore is 4. GCS verbal subscore is 5. GCS motor subscore is 6.      Motor: Weakness present.      Comments: Clearer speech today. Seems to understand and have near normal conversation. Speech garbled by secretions. Delayed responses noted.        Significant Labs: All pertinent labs within the past 24 hours have been reviewed.  Microbiology Results (last 7 days)       Procedure Component Value Units Date/Time    Blood culture [[id number]] Collected: 10/11/22 2330    Order Status: Completed Specimen: Blood from Peripheral, Hand, Right Updated: 10/17/22 0612     Blood Culture, Routine No growth after 5 days.    Blood culture [[id number]] Collected: 10/11/22 2340    Order Status: Completed Specimen: Blood from Peripheral, Hand, Left Updated: 10/17/22 0612     Blood Culture, Routine No growth after 5 days.    Culture, Respiratory with Gram Stain [[id number]] Collected: 10/14/22 2223    Order Status: Completed Specimen: Respiratory from Sputum Updated: 10/15/22 2112     Respiratory Culture Specimen inadequate - culture not performed     Gram Stain (Respiratory) >10epis/lfp and <than many WBC's     Gram Stain (Respiratory) Predominance of oropharyngeal xochitl. Please recollect.    Urine culture [[id number]]  (Abnormal)  (Susceptibility) Collected: 10/11/22 2109    Order Status: Completed Specimen: Urine Updated: 10/15/22 1105     Urine Culture, Routine PROTEUS VULGARIS  10,000 - 49,999 cfu/ml      Narrative:      Specimen Source->Urine  Previous value was Presumptive Proteus species verified by [id number] at   14:22 on 10/14/2022  Previous quantitation was 50,000 - 99,999 cfu/ml verified by [id number] at   14:22 on 10/14/2022  Previous comment was modified by [id number] at  14:22  on  10/14/2022             Significant Imaging: I have reviewed all pertinent imaging results/findings within the past 24 hours.

## 2022-10-19 NOTE — SUBJECTIVE & OBJECTIVE
Interval History:     Medications:  Continuous Infusions:   dextrose 5 % 75 mL/hr at 10/19/22 0941    heparin (porcine) in D5W 9 Units/kg/hr (10/19/22 1150)     Scheduled Meds:   ceFEPime (MAXIPIME) IVPB  1 g Intravenous Q12H    glycopyrrolate (PF)  0.1 mg Intravenous Q4H    metoprolol  5 mg Intravenous Q6H    thiamine (VITAMIN B1) IVPB  500 mg Intravenous TID     PRN Meds:albuterol-ipratropium, diphenhydrAMINE, heparin (PORCINE), heparin (PORCINE), OLANZapine, sodium chloride 3%    Objective:     Vital Signs (Most Recent):  Temp: 98.2 °F (36.8 °C) (10/19/22 1141)  Pulse: 65 (10/19/22 1207)  Resp: 18 (10/19/22 1141)  BP: 100/60 (10/19/22 1147)  SpO2: 98 % (10/19/22 1147) Vital Signs (24h Range):  Temp:  [96.3 °F (35.7 °C)-98.4 °F (36.9 °C)] 98.2 °F (36.8 °C)  Pulse:  [63-87] 65  Resp:  [16-20] 18  SpO2:  [95 %-100 %] 98 %  BP: ()/(60-82) 100/60     Weight: 61.6 kg (135 lb 12.9 oz)  Body mass index is 21.27 kg/m².     Physical Exam  Vitals and nursing note reviewed.   Constitutional:       Appearance: He is ill-appearing.   HENT:      Head: Normocephalic.      Mouth/Throat:      Mouth: Mucous membranes are dry.   Cardiovascular:      Rate and Rhythm: Rhythm irregular.   Pulmonary:      Effort: Pulmonary effort is normal.      Breath sounds: Examination of the right-upper field reveals rhonchi. Examination of the left-upper field reveals rhonchi. Examination of the right-middle field reveals rhonchi. Examination of the left-middle field reveals rhonchi. Examination of the right-lower field reveals rhonchi. Examination of the left-lower field reveals rhonchi. Rhonchi present.     Abdominal:      General: Abdomen is flat. Bowel sounds are normal.      Palpations: Abdomen is soft.   Skin:     General: Skin is dry.      Capillary Refill: Capillary refill takes less than 2 seconds.      Coloration: Skin is pale.   Neurological:    Comments: Pt opened eyes to name but did not speak today.    Psychiatric:          Speech: Speech is slurred.         Behavior: Behavior is slowed.          Review of Symptoms        Symptom Assessment (ESAS 0-10 Scale)  Pain:  0  Dyspnea:  0  Anxiety:  0  Nausea:  0  Depression:  0  Anorexia:  0  Fatigue:  0  Insomnia:  0  Restlessness:  0  Agitation:  0  Unable to complete assessment due to Mental status change            Pain Assessment in Advanced Demential Scale (PAINAD)   Breathing - Independent of vocalization:  0  Negative vocalization:  0  Facial expression:  0  Body language:  0  Consolability:  0  Total:  0     Performance Status:  30     Living Arrangements:  Lives in nursing home     Psychosocial/Cultural: Pt came from [facility], prior to that, pt lived in a double home with a caretaker who lived next door.      Spiritual:  F - Camille and Belief:  Unknown   I - Importance:  Unknown      Time-Based Charting:  Yes  Chart Review: 21 minutes  Face to Face: 14 minutes  Symptom Assessment: 17 minutes  Coordination of Care: 15 minutes  Discharge Planning: 10 minutes  Advance Care Plannin minutes  Goals of Care: 8 minutes     Total Time Spent: 93 minutes        Advance Care Planning   Advance Directives:   Living Will: Yes        Copy on chart: Yes    Do Not Resuscitate Status: Yes    Medical Power of : Yes    Agent's Name:  Jc Melgar     Decision Making:  Patient unable to communicate due to disease severity/cognitive impairment  Goals of Care: The healthcare power of   endorses that what is most important right now is to focus on avoiding the hospital, quality of life, even if it means sacrificing a little time, and comfort and QOL      Accordingly, we have decided that the best plan to meet the patient's goals includes enrolling in hospice care       Significant Labs: All pertinent labs within the past 24 hours have been reviewed.  CBC:   Recent Labs   Lab 10/19/22  0301   WBC 12.10   HGB 10.5*   HCT 32.6*   *        BMP:  Recent Labs    Lab 10/19/22  0301   *      K 3.7      CO2 19*   BUN 11   CREATININE 1.0   CALCIUM 9.3     LFT:  Lab Results   Component Value Date    AST 17 10/14/2022    ALKPHOS 94 10/14/2022    BILITOT 0.5 10/14/2022     Albumin:   Albumin   Date Value Ref Range Status   10/19/2022 2.5 (L) 3.5 - 5.2 g/dL Final     Protein:   Total Protein   Date Value Ref Range Status   10/14/2022 5.3 (L) 6.0 - 8.4 g/dL Final     Lactic acid:   Lab Results   Component Value Date    LACTATE 1.0 10/11/2022       Significant Imaging: I have reviewed all pertinent imaging results/findings within the past 24 hours.

## 2022-10-19 NOTE — ASSESSMENT & PLAN NOTE
Heart rate controlled  Continuous cardiac monitoring  [id number]  Pt was on eliquis 5 mg bid, failed swallow study.  Start low intensity heparin drip.  Cards eval appreciated: continue IV Metoprolol for now; transition back to oral Metoprolol when able to tolerate po meds     Results for orders placed during the hospital encounter of 10/11/22    Echo    Interpretation Summary  · The left ventricle is normal in size with concentric remodeling and normal systolic function.  · The estimated ejection fraction is 55%.  · A diastolic pattern consistent with atrial fibrillation observed.  · Mild-to-moderate mitral regurgitation.  · There is mild aortic valve stenosis.  · Aortic valve area is 1.36 cm2; peak velocity is 2.16 m/s; mean gradient is 11 mmHg.  · Elevated central venous pressure (15 mmHg).  · The estimated PA systolic pressure is 45 mmHg.   · Mild tricuspid regurgitation.  · Normal right ventricular size with normal right ventricular systolic function.  · There is pulmonary hypertension.

## 2022-10-19 NOTE — ASSESSMENT & PLAN NOTE
Bipolar disorder    Transferred from [facility] due to medical issues  Stable  PEC on admission  Consult Psychiatry for continued management   Psych recommended d/c PEC

## 2022-10-19 NOTE — PROGRESS NOTES
"[facility]  Palliative Medicine  Progress Note    Patient Name: Cheikh Yanez  MRN: [id number]  Admission Date: 10/11/2022  Hospital Length of Stay: 7 days  Code Status: DNR   Attending Provider: Mike Woods MD  Consulting Provider: Hannah Renee NP  Primary Care Physician: Primary Doctor No  Principal Problem:Encephalopathy due to infection    Patient information was obtained from past medical records and primary team.      Assessment/Plan:     Palliative care encounter  10/19  At time of visit, pt sleeping in bed.  Pt arouseable to touch and name.  Pt briefly made eye contact and then returned to sleep.  Per SLP reevaluation today, "Pt is NOT deemed appropriate for oral diet at this time. Pt is NOT making any progress towards oral trials. Secure chat sent to team to make them aware of above results."  Given this new information, there was [facility] discussion [facility] and providers if a PEG tube would now be in the best interest of the pt.  Ultimately, a PEG tube was decided against as it does not align with the pts previously stated goals of care. Per [facility], they would like to continue the plan of hospice enrollment upon discharge.       10/17  At time of initial consult, pt sitting up in bed.  Pt able to follow simple commands.  When asked where he was the pt responded "[place]."  All other verbalizations  were muddled and unintelligible. Pt demonstrates wet cough throughout interview.  Pt with difficulty clearing secretions.  Glycopyrrolate iv ordered.    Reached out to the [facility]ts [facility] for collateral information.  [facility] reports a recent drastic decline in the pts over all functional status both mentally and physically over the past few months.  Pt was placed in [facility] for a few days, sent to [facility] due to concerns and then placed at [facility].  [facility] is in agreement for pt to return to [facility] when cleared.  We discussed goals of care.  [facility] endorses that pt had expressed that he did " "not want to artificially prolong his life with invasive measures in the past.  We discussed the pts multiple failed swallow studies recently.  REMIGIO stated that the pt would not want a feeding tube of any kind.  I introduced the topic of hospice by name. REMIGIO is very familiar with hospice.  We discussed what hospice could offer the pt.  REMIGIO agrees that enrolling the pt in hospice upon discharge to NH aligns with the pts goals.  MD and [place] made aware.           Subjective:     Chief Complaint:   Chief Complaint   Patient presents with    Altered Mental Status     Pt sent from [facility] for eval of AMS. Per Chani at [facility], pt sent for AMS, states that pt has been somnolent on today and had blood work that showed an ALICIA. Chani states that the doctor also noted abnormal lung sounds. Per Chani, pt baseline mentation is oriented to self and sometimes situation only. Pt currently a PEC for SI.         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 Patient unable to provide any history. Per RN, patient normally oriented to self and now is somnolent.  ED findings:  WBCs 16.91, potassium 5.2, creatinine 2.2, troponin 0.120, EKG without ischemic changes, u/a concerning for infectious process,  chest x-ray showed a right basilar airspace opacity, covid pending, blood cultures pending.  Patient admitted to hospital medicine observation unit for further medical management. "      Hospital Course:  No notes on file    Interval History:     Medications:  Continuous Infusions:   dextrose 5 % 75 mL/hr at 10/19/22 0941    heparin (porcine) in D5W 9 Units/kg/hr (10/19/22 1150)     Scheduled Meds:   ceFEPime (MAXIPIME) IVPB  1 g Intravenous Q12H    glycopyrrolate (PF)  0.1 mg Intravenous Q4H    metoprolol  5 mg Intravenous Q6H    " thiamine (VITAMIN B1) IVPB  500 mg Intravenous TID     PRN Meds:albuterol-ipratropium, diphenhydrAMINE, heparin (PORCINE), heparin (PORCINE), OLANZapine, sodium chloride 3%    Objective:     Vital Signs (Most Recent):  Temp: 98.2 °F (36.8 °C) (10/19/22 1141)  Pulse: 65 (10/19/22 1207)  Resp: 18 (10/19/22 1141)  BP: 100/60 (10/19/22 1147)  SpO2: 98 % (10/19/22 1147) Vital Signs (24h Range):  Temp:  [96.3 °F (35.7 °C)-98.4 °F (36.9 °C)] 98.2 °F (36.8 °C)  Pulse:  [63-87] 65  Resp:  [16-20] 18  SpO2:  [95 %-100 %] 98 %  BP: ()/(60-82) 100/60     Weight: 61.6 kg (135 lb 12.9 oz)  Body mass index is 21.27 kg/m².     Physical Exam  Vitals and nursing note reviewed.   Constitutional:       Appearance: He is ill-appearing.   HENT:      Head: Normocephalic.      Mouth/Throat:      Mouth: Mucous membranes are dry.   Cardiovascular:      Rate and Rhythm: Rhythm irregular.   Pulmonary:      Effort: Pulmonary effort is normal.      Breath sounds: Examination of the right-upper field reveals rhonchi. Examination of the left-upper field reveals rhonchi. Examination of the right-middle field reveals rhonchi. Examination of the left-middle field reveals rhonchi. Examination of the right-lower field reveals rhonchi. Examination of the left-lower field reveals rhonchi. Rhonchi present.     Abdominal:      General: Abdomen is flat. Bowel sounds are normal.      Palpations: Abdomen is soft.   Skin:     General: Skin is dry.      Capillary Refill: Capillary refill takes less than 2 seconds.      Coloration: Skin is pale.   Neurological:    Comments: Pt opened eyes to name but did not speak today.    Psychiatric:         Speech: Speech is slurred.         Behavior: Behavior is slowed.          Review of Symptoms        Symptom Assessment (ESAS 0-10 Scale)  Pain:  0  Dyspnea:  0  Anxiety:  0  Nausea:  0  Depression:  0  Anorexia:  0  Fatigue:  0  Insomnia:  0  Restlessness:  0  Agitation:  0  Unable to complete assessment due to  Mental status change            Pain Assessment in Advanced Demential Scale (PAINAD)   Breathing - Independent of vocalization:  0  Negative vocalization:  0  Facial expression:  0  Body language:  0  Consolability:  0  Total:  0     Performance Status:  30     Living Arrangements:  Lives in nursing home     Psychosocial/Cultural: Pt came from [facility], prior to that, pt lived in a double home with a caretaker who lived next door.      Spiritual:  F - Camille and Belief:  Unknown   I - Importance:  Unknown      Time-Based Charting:  Yes  Chart Review: 21 minutes  Face to Face: 14 minutes  Symptom Assessment: 17 minutes  Coordination of Care: 15 minutes  Discharge Planning: 10 minutes  Advance Care Plannin minutes  Goals of Care: 8 minutes     Total Time Spent: 93 minutes        Advance Care Planning   Advance Directives:   Living Will: Yes        Copy on chart: Yes    Do Not Resuscitate Status: Yes    Medical Power of : Yes    Agent's Name:  Jc Melgar     Decision Making:  Patient unable to communicate due to disease severity/cognitive impairment  Goals of Care: The healthcare power of   endorses that what is most important right now is to focus on avoiding the hospital, quality of life, even if it means sacrificing a little time, and comfort and QOL      Accordingly, we have decided that the best plan to meet the patient's goals includes enrolling in hospice care       Significant Labs: All pertinent labs within the past 24 hours have been reviewed.  CBC:   Recent Labs   Lab 10/19/22  0301   WBC 12.10   HGB 10.5*   HCT 32.6*   *        BMP:  Recent Labs   Lab 10/19/22  0301   *      K 3.7      CO2 19*   BUN 11   CREATININE 1.0   CALCIUM 9.3     LFT:  Lab Results   Component Value Date    AST 17 10/14/2022    ALKPHOS 94 10/14/2022    BILITOT 0.5 10/14/2022     Albumin:   Albumin   Date Value Ref Range Status   10/19/2022 2.5 (L) 3.5 - 5.2 g/dL  Final     Protein:   Total Protein   Date Value Ref Range Status   10/14/2022 5.3 (L) 6.0 - 8.4 g/dL Final     Lactic acid:   Lab Results   Component Value Date    LACTATE 1.0 10/11/2022       Significant Imaging: I have reviewed all pertinent imaging results/findings within the past 24 hours.      Hannah Renee NP  Palliative Medicine  [place] [facility]

## 2022-10-19 NOTE — PLAN OF CARE
2200 Pt appeared to be in no distress or pain.     No accu ck.     Tele: Paced, afbi,  HR 60 ,  No alarms.     Bed in lowest position, wheels locked, non skid socks, ID band worn, personal items and call bell with in reach, bed alarm set.

## 2022-10-19 NOTE — PLAN OF CARE
10/19/22 1038   Post-Acute Status   Post-Acute Authorization Placement   Post-Acute Placement Status Pending post-acute provider review/more information requested  (Discussed with admissions. Faciity is recommending pt get PEG tube. Per attending NP and ST but is not a good candidate for PEG tube. Updated facility and sent over updated clinicals. PENDING DECISION.)

## 2022-10-19 NOTE — ASSESSMENT & PLAN NOTE
"10/19  At time of visit, pt sleeping in bed.  Pt arouseable to touch and name.  Pt briefly made eye contact and then returned to sleep.  Per SLP reevaluation today, "Pt is NOT deemed appropriate for oral diet at this time. Pt is NOT making any progress towards oral trials. Secure chat sent to team to make them aware of above results."  Given this new information, there was some discussion between [facility] and providers if a PEG tube would now be in the best interest of the pt.  Ultimately, a PEG tube was decided against as it does not align with the pts previously stated goals of care. Per [facility], they would like to continue the plan of hospice enrollment upon discharge.       10/17  At time of initial consult, pt sitting up in bed.  Pt able to follow simple commands.  When asked where he was the pt responded "[place]."  All other verbalizations  were muddled and unintelligible. Pt demonstrates wet cough throughout interview.  Pt with difficulty clearing secretions.  Glycopyrrolate iv ordered.    Reached out to the pts [facility] Jc Melgar for collateral information.  [facility] reports a recent drastic decline in the pts over all functional status both mentally and physically over the past few months.  Pt was placed in [facility] for a few days, sent to [facility] due to concerns and then placed at [facility].  [facility] is in agreement for pt to return to [facility] when cleared.  We discussed goals of care.  [facility] endorses that pt had expressed that he did not want to artificially prolong his life with invasive measures in the past.  We discussed the pts multiple failed swallow studies recently.  [facility] stated that the pt would not want a feeding tube of any kind.  I introduced the topic of hospice by name. [facility] is very familiar with hospice.  We discussed what hospice could offer the pt.  [facility] agrees that enrolling the pt in hospice upon discharge to NH aligns with the pts goals.  MD and CM made aware.   "

## 2022-10-19 NOTE — PLAN OF CARE
Recommendation:  1. Await family decision on PEG/hospice.   2. If TF desired, initiate TF of Isosource 1.5 at 10ml/hr and advance as tolerated to goal rate of 50ml/hr which would provide 1800 kcal, 81g protein, & 916ml free water.   3. Monitor weight/labs.   4. RD to continue to follow to monitor nutrition status    Goals:  TF or diet will br started by RD follow up  Nutrition Goal Status:  (not met/continues)

## 2022-10-19 NOTE — PT/OT/SLP PROGRESS
Speech Language Pathology Treatment    Patient Name:  Cheikh Yanez   MRN:  [id number]  Admitting Diagnosis: Encephalopathy due to infection    Recommendations:                 General Recommendations:  Palliative following, [place] [facility]  Diet recommendations:  NPO, Liquid Diet Level: NPO   Aspiration Precautions: oral care, ice chips sparingly, watch for respirations and wet VOICE   General Precautions: Standard, aspiration, fall, respiratory, hearing impaired  Communication strategies:  listen carefully, turn off background noise    Subjective   SLP re-consulted this date as family considering peg placement now.     Patient goals: moaningjaime     Pain/Comfort:  Pain Rating 1:  (moaning noted)    Respiratory Status: Room air    Objective:     Has the patient been evaluated by SLP for swallowing?   Yes  Keep patient NPO? Yes   Current Respiratory Status:    room air     Swallowing: Pt seen in room, he is reclined in bed, occasional moaning noted when HOB was elevated. Pt stating jaime sandoval.   Pt with wet breathing noted and poor cough, attempted coughing exercise with pt with no success. Pt given ice chips x2, water by tsp swallows x3 with audible wet and choking noted. Pt observed to turn RED in the face, eyes watering. Pt demonstrated poor ability to expectorate from airway. SLP strongly suspects that pt aspirated into airway with poor ability to catch his breath and needed some time to recover. Pt continued with wet voice after several minutes and oral suctioning was initiated. Pt sounds like material is sitting directly on his vocal cords which again places him at pulmonary risk for airway compromise. Pt is NOT deemed appropriate for oral diet at this time. Pt is NOT making any progress towards oral trials.   Secure chat sent to team to make them aware of above results.     Per this clinician's expertise- long term source of nutrition (ie peg) is NOT recommended as pt would continue to be at risk for  airway aspiration of peg tube feedings. Pt demonstrates very guarded prognosis and placing a surgical peg would cause more harm plus result in repeated hospitalizations and further disease process complications. Pt also demonstrates severe cognitive deficits which adversely impacts his nutritional status plus he may pull out any tubes if agitated. Family should discuss concerns with MD, Palliative and SLP if needed    Assessment:     Cheikh Yanez is a 81 y.o. male admitted with Encephalopathy due to infection who presents with an SLP diagnosis of clinical s/sx of airway aspiration.       Goals:   Multidisciplinary Problems       SLP Goals          Problem: SLP    Goal Priority Disciplines Outcome   SLP Goal     SLP Ongoing, Not Progressing                       Plan:       Plan of Care expires:  11/10/22  Plan of Care reviewed with:  patient   SLP Follow-Up:  No       Discharge recommendations:  nursing facility, basic (Palliatve following)   Barriers to Discharge:  None    Time Tracking:     SLP Treatment Date:   10/19/22  Speech Start Time:  0950  Speech Stop Time:  1002     Speech Total Time (min):  12 min    Billable Minutes: Treatment Swallowing Dysfunction 12    10/19/2022

## 2022-10-19 NOTE — SUBJECTIVE & OBJECTIVE
No past medical history on file.    No past surgical history on file.    Review of patient's allergies indicates:  No Known Allergies  Family History    None       Tobacco Use    Smoking status: Not on file    Smokeless tobacco: Not on file   Substance and Sexual Activity    Alcohol use: Not on file    Drug use: Not on file    Sexual activity: Not on file     Review of Systems   Unable to perform ROS: Mental status change   Objective:     Vital Signs (Most Recent):  Temp: 98.2 °F (36.8 °C) (10/19/22 1141)  Pulse: 65 (10/19/22 1207)  Resp: 18 (10/19/22 1141)  BP: 100/60 (10/19/22 1147)  SpO2: 98 % (10/19/22 1147) Vital Signs (24h Range):  Temp:  [96.3 °F (35.7 °C)-98.4 °F (36.9 °C)] 98.2 °F (36.8 °C)  Pulse:  [63-87] 65  Resp:  [16-20] 18  SpO2:  [95 %-100 %] 98 %  BP: ()/(60-70) 100/60     Weight: 61.6 kg (135 lb 12.9 oz) (10/19/22 1244)  Body mass index is 21.27 kg/m².      Intake/Output Summary (Last 24 hours) at 10/19/2022 1607  Last data filed at 10/19/2022 0520  Gross per 24 hour   Intake 9147.16 ml   Output 975 ml   Net 8172.16 ml       Lines/Drains/Airways       Drain  Duration             Male External Urinary Catheter 10/13/22 0830 Small 6 days              Peripheral Intravenous Line  Duration                  Peripheral IV - Single Lumen 10/13/22 0930 Anterior;Left Forearm 6 days         Peripheral IV - Single Lumen 10/15/22 1000 22 G Left;Posterior Hand 4 days         Peripheral IV - Single Lumen 10/15/22 1359 22 G Anterior;Proximal;Right Upper Arm 4 days                    Physical Exam  Vitals reviewed.   Constitutional:       Appearance: He is ill-appearing.   HENT:      Head: Normocephalic and atraumatic.   Eyes:      General: No scleral icterus.     Conjunctiva/sclera: Conjunctivae normal.   Cardiovascular:      Rate and Rhythm: Normal rate.   Pulmonary:      Effort: Pulmonary effort is normal.      Breath sounds: Normal breath sounds.   Abdominal:      General: There is no distension.       Palpations: Abdomen is soft.   Musculoskeletal:         General: No swelling or tenderness.      Cervical back: Neck supple.   Lymphadenopathy:      Cervical: No cervical adenopathy.   Neurological:      Mental Status: He is disoriented.   Psychiatric:      Comments: Unable to asses       Significant Labs:  CBC:   Recent Labs   Lab 10/18/22  0413 10/19/22  0301   WBC 10.49 12.10   HGB 11.5* 10.5*   HCT 36.2* 32.6*    264     BMP:   Recent Labs   Lab 10/19/22  0301   *      K 3.7      CO2 19*   BUN 11   CREATININE 1.0   CALCIUM 9.3     CMP:   Recent Labs   Lab 10/19/22  0301   *   CALCIUM 9.3   ALBUMIN 2.5*      K 3.7   CO2 19*      BUN 11   CREATININE 1.0     Coagulation:   Recent Labs   Lab 10/19/22  0301   APTT 42.1*       Significant Imaging:  Imaging results within the past 24 hours have been reviewed.

## 2022-10-20 VITALS
HEART RATE: 75 BPM | BODY MASS INDEX: 21.45 KG/M2 | RESPIRATION RATE: 21 BRPM | WEIGHT: 136.69 LBS | TEMPERATURE: 97 F | DIASTOLIC BLOOD PRESSURE: 72 MMHG | SYSTOLIC BLOOD PRESSURE: 127 MMHG | HEIGHT: 67 IN | OXYGEN SATURATION: 98 %

## 2022-10-20 LAB
ALBUMIN SERPL BCP-MCNC: 2.4 G/DL (ref 3.5–5.2)
ANION GAP SERPL CALC-SCNC: 8 MMOL/L (ref 8–16)
APTT BLDCRRT: 40.2 SEC (ref 21–32)
BASOPHILS # BLD AUTO: 0.04 K/UL (ref 0–0.2)
BASOPHILS NFR BLD: 0.3 % (ref 0–1.9)
BUN SERPL-MCNC: 12 MG/DL (ref 8–23)
CALCIUM SERPL-MCNC: 9.1 MG/DL (ref 8.7–10.5)
CHLORIDE SERPL-SCNC: 108 MMOL/L (ref 95–110)
CO2 SERPL-SCNC: 16 MMOL/L (ref 23–29)
CREAT SERPL-MCNC: 1.3 MG/DL (ref 0.5–1.4)
DIFFERENTIAL METHOD: ABNORMAL
EOSINOPHIL # BLD AUTO: 0.2 K/UL (ref 0–0.5)
EOSINOPHIL NFR BLD: 1.7 % (ref 0–8)
ERYTHROCYTE [DISTWIDTH] IN BLOOD BY AUTOMATED COUNT: 15.1 % (ref 11.5–14.5)
EST. GFR  (NO RACE VARIABLE): 56 ML/MIN/1.73 M^2
GLUCOSE SERPL-MCNC: 139 MG/DL (ref 70–110)
HCT VFR BLD AUTO: 36.1 % (ref 40–54)
HGB BLD-MCNC: 11.3 G/DL (ref 14–18)
IMM GRANULOCYTES # BLD AUTO: 0.07 K/UL (ref 0–0.04)
IMM GRANULOCYTES NFR BLD AUTO: 0.6 % (ref 0–0.5)
LYMPHOCYTES # BLD AUTO: 2.4 K/UL (ref 1–4.8)
LYMPHOCYTES NFR BLD: 20.8 % (ref 18–48)
MCH RBC QN AUTO: 32.3 PG (ref 27–31)
MCHC RBC AUTO-ENTMCNC: 31.3 G/DL (ref 32–36)
MCV RBC AUTO: 103 FL (ref 82–98)
MONOCYTES # BLD AUTO: 1.2 K/UL (ref 0.3–1)
MONOCYTES NFR BLD: 10 % (ref 4–15)
NEUTROPHILS # BLD AUTO: 7.7 K/UL (ref 1.8–7.7)
NEUTROPHILS NFR BLD: 66.6 % (ref 38–73)
NRBC BLD-RTO: 0 /100 WBC
PHOSPHATE SERPL-MCNC: 3.5 MG/DL (ref 2.7–4.5)
PLATELET # BLD AUTO: 181 K/UL (ref 150–450)
PMV BLD AUTO: 12 FL (ref 9.2–12.9)
POTASSIUM SERPL-SCNC: 4.5 MMOL/L (ref 3.5–5.1)
RBC # BLD AUTO: 3.5 M/UL (ref 4.6–6.2)
SODIUM SERPL-SCNC: 132 MMOL/L (ref 136–145)
WBC # BLD AUTO: 11.51 K/UL (ref 3.9–12.7)

## 2022-10-20 PROCEDURE — 36415 COLL VENOUS BLD VENIPUNCTURE: CPT | Performed by: PHYSICIAN ASSISTANT

## 2022-10-20 PROCEDURE — 25000003 PHARM REV CODE 250: Performed by: STUDENT IN AN ORGANIZED HEALTH CARE EDUCATION/TRAINING PROGRAM

## 2022-10-20 PROCEDURE — 36415 COLL VENOUS BLD VENIPUNCTURE: CPT | Performed by: INTERNAL MEDICINE

## 2022-10-20 PROCEDURE — 85025 COMPLETE CBC W/AUTO DIFF WBC: CPT | Performed by: PHYSICIAN ASSISTANT

## 2022-10-20 PROCEDURE — 63600175 PHARM REV CODE 636 W HCPCS: Performed by: STUDENT IN AN ORGANIZED HEALTH CARE EDUCATION/TRAINING PROGRAM

## 2022-10-20 PROCEDURE — 94761 N-INVAS EAR/PLS OXIMETRY MLT: CPT

## 2022-10-20 PROCEDURE — 93005 ELECTROCARDIOGRAM TRACING: CPT

## 2022-10-20 PROCEDURE — 63600175 PHARM REV CODE 636 W HCPCS: Performed by: NURSE PRACTITIONER

## 2022-10-20 PROCEDURE — 63600175 PHARM REV CODE 636 W HCPCS: Performed by: PHYSICIAN ASSISTANT

## 2022-10-20 PROCEDURE — 63600175 PHARM REV CODE 636 W HCPCS: Performed by: INTERNAL MEDICINE

## 2022-10-20 PROCEDURE — 80069 RENAL FUNCTION PANEL: CPT | Performed by: PHYSICIAN ASSISTANT

## 2022-10-20 PROCEDURE — 93010 ELECTROCARDIOGRAM REPORT: CPT | Mod: ,,, | Performed by: INTERNAL MEDICINE

## 2022-10-20 PROCEDURE — 25000003 PHARM REV CODE 250: Performed by: PHYSICIAN ASSISTANT

## 2022-10-20 PROCEDURE — 85730 THROMBOPLASTIN TIME PARTIAL: CPT | Performed by: INTERNAL MEDICINE

## 2022-10-20 PROCEDURE — 93010 EKG 12-LEAD: ICD-10-PCS | Mod: ,,, | Performed by: INTERNAL MEDICINE

## 2022-10-20 PROCEDURE — 99900035 HC TECH TIME PER 15 MIN (STAT)

## 2022-10-20 RX ADMIN — METOROPROLOL TARTRATE 5 MG: 5 INJECTION, SOLUTION INTRAVENOUS at 06:10

## 2022-10-20 RX ADMIN — GLYCOPYRROLATE 0.1 MG: 0.2 INJECTION, SOLUTION INTRAMUSCULAR; INTRAVITREAL at 11:10

## 2022-10-20 RX ADMIN — GLYCOPYRROLATE 0.1 MG: 0.2 INJECTION, SOLUTION INTRAMUSCULAR; INTRAVITREAL at 02:10

## 2022-10-20 RX ADMIN — METOROPROLOL TARTRATE 5 MG: 5 INJECTION, SOLUTION INTRAVENOUS at 11:10

## 2022-10-20 RX ADMIN — THIAMINE HYDROCHLORIDE 500 MG: 100 INJECTION, SOLUTION INTRAMUSCULAR; INTRAVENOUS at 08:10

## 2022-10-20 RX ADMIN — DIPHENHYDRAMINE HYDROCHLORIDE 25 MG: 50 INJECTION, SOLUTION INTRAMUSCULAR; INTRAVENOUS at 06:10

## 2022-10-20 RX ADMIN — GLYCOPYRROLATE 0.1 MG: 0.2 INJECTION, SOLUTION INTRAMUSCULAR; INTRAVITREAL at 06:10

## 2022-10-20 RX ADMIN — CEFEPIME 1 G: 1 INJECTION, POWDER, FOR SOLUTION INTRAMUSCULAR; INTRAVENOUS at 03:10

## 2022-10-20 RX ADMIN — DEXTROSE: 5 SOLUTION INTRAVENOUS at 01:10

## 2022-10-20 NOTE — ASSESSMENT & PLAN NOTE
Facility medlist: atorvastatin 80 mg qhs, finasteride 5 mg qhs, lisinopril 40 mg daily, melatonin 15 mg qhs, metoprolol ER 25 mg daily, MVI daily, spironolactone 25 mg qhs, thiamine 100 mg daily, duloxetine 40 mg daily, eliquis 5 mg bid daily, trazodone 25 mg daily, as needed mag hydroxide, as need maalox, as need tylenol 325 mg q 4 prn, as needed nitro  -failed swallow study, oral meds on hold  -heparin drip initiated - will be stopped at discharge

## 2022-10-20 NOTE — ASSESSMENT & PLAN NOTE
Bipolar disorder    Transferred from [facility] due to medical issues  Stable  PEC on admission  Consult Psychiatry for continued management   Psych recommended d/c PEC

## 2022-10-20 NOTE — ASSESSMENT & PLAN NOTE
Heart rate controlled  Continuous cardiac monitoring  [id number] 5  Pt was on eliquis 5 mg bid, failed swallow study.  Start low intensity heparin drip.  Cards eval appreciated: continue IV Metoprolol for now; transition back to oral Metoprolol when able to tolerate po meds     Results for orders placed during the hospital encounter of 10/11/22    Echo    Interpretation Summary  · The left ventricle is normal in size with concentric remodeling and normal systolic function.  · The estimated ejection fraction is 55%.  · A diastolic pattern consistent with atrial fibrillation observed.  · Mild-to-moderate mitral regurgitation.  · There is mild aortic valve stenosis.  · Aortic valve area is 1.36 cm2; peak velocity is 2.16 m/s; mean gradient is 11 mmHg.  · Elevated central venous pressure (15 mmHg).  · The estimated PA systolic pressure is 45 mmHg.   · Mild tricuspid regurgitation.  · Normal right ventricular size with normal right ventricular systolic function.  · There is pulmonary hypertension.      BP low normal, IV metoprolol held x multiple doses

## 2022-10-20 NOTE — PLAN OF CARE
POC reviewed with patient.     Problem: Adult Inpatient Plan of Care  Goal: Plan of Care Review  Outcome: Ongoing, Progressing     Problem: Fall Injury Risk  Goal: Absence of Fall and Fall-Related Injury  Outcome: Ongoing, Progressing     Problem: Skin Injury Risk Increased  Goal: Skin Health and Integrity  Outcome: Ongoing, Progressing     Problem: Hypertension Comorbidity  Goal: Blood Pressure in Desired Range  Outcome: Ongoing, Progressing

## 2022-10-20 NOTE — SUBJECTIVE & OBJECTIVE
Interval History:  No major issues today. GI and POA decided against PEG tube.   Patient needs 1 more day of Abx. Patient asking for Food. OK for pleasure feeds only as patient will be discharged with Hospice.   Awaiting placement with nursing home/hospice.         Review of Systems   Unable to perform ROS: Mental status change   Objective:     Vital Signs (Most Recent):  Temp: 96.5 °F (35.8 °C) (10/20/22 0500)  Pulse: 73 (10/20/22 0825)  Resp: (!) 21 (10/20/22 0751)  BP: 127/72 (10/20/22 0751)  SpO2: 98 % (10/20/22 0825)   Vital Signs (24h Range):  Temp:  [96.2 °F (35.7 °C)-98.2 °F (36.8 °C)] 96.5 °F (35.8 °C)  Pulse:  [62-99] 73  Resp:  [18-21] 21  SpO2:  [96 %-100 %] 98 %  BP: (100-142)/(60-87) 127/72     Weight: 62 kg (136 lb 11 oz)  Body mass index is 21.41 kg/m².    Intake/Output Summary (Last 24 hours) at 10/20/2022 0909  Last data filed at 10/20/2022 0606  Gross per 24 hour   Intake 1660.15 ml   Output 800 ml   Net 860.15 ml        Physical Exam  HENT:      Head: Normocephalic and atraumatic.      Mouth/Throat:      Pharynx: Oropharynx is clear.   Eyes:      Conjunctiva/sclera: Conjunctivae normal.   Cardiovascular:      Rate and Rhythm: Normal rate. Rhythm irregular.      Pulses: Normal pulses.   Pulmonary:      Effort: Pulmonary effort is normal. No respiratory distress.      Breath sounds: Normal breath sounds. No wheezing.   Abdominal:      General: Bowel sounds are normal.      Palpations: Abdomen is soft.      Tenderness: There is no abdominal tenderness.   Musculoskeletal:         General: No deformity.      Cervical back: Neck supple.      Comments: Very weak grasp to bilateral hands   Skin:     General: Skin is warm and dry.   Neurological:      Mental Status: He is alert.      GCS: GCS eye subscore is 4. GCS verbal subscore is 5. GCS motor subscore is 6.      Motor: Weakness present.      Comments: Clearer speech today. Seems to understand and have near normal conversation. Speech garbled by  secretions. Delayed responses noted.        Significant Labs: All pertinent labs within the past 24 hours have been reviewed.  Microbiology Results (last 7 days)       Procedure Component Value Units Date/Time    Blood culture [[id number]] Collected: 10/11/22 2330    Order Status: Completed Specimen: Blood from Peripheral, Hand, Right Updated: 10/17/22 0612     Blood Culture, Routine No growth after 5 days.    Blood culture [[id number]] Collected: 10/11/22 2340    Order Status: Completed Specimen: Blood from Peripheral, Hand, Left Updated: 10/17/22 0612     Blood Culture, Routine No growth after 5 days.    Culture, Respiratory with Gram Stain [[id number]] Collected: 10/14/22 2223    Order Status: Completed Specimen: Respiratory from Sputum Updated: 10/15/22 2112     Respiratory Culture Specimen inadequate - culture not performed     Gram Stain (Respiratory) >10epis/lfp and <than many WBC's     Gram Stain (Respiratory) Predominance of oropharyngeal xochitl. Please recollect.    Urine culture [[id number]]  (Abnormal)  (Susceptibility) Collected: 10/11/22 2109    Order Status: Completed Specimen: Urine Updated: 10/15/22 1105     Urine Culture, Routine PROTEUS VULGARIS  10,000 - 49,999 cfu/ml      Narrative:      Specimen Source->Urine  Previous value was Presumptive Proteus species verified by [id number] at   14:22 on 10/14/2022  Previous quantitation was 50,000 - 99,999 cfu/ml verified by [id number] at   14:22 on 10/14/2022  Previous comment was modified by [id number] at  14:22  on  10/14/2022             Significant Imaging: I have reviewed all pertinent imaging results/findings within the past 24 hours.

## 2022-10-20 NOTE — ASSESSMENT & PLAN NOTE
HCAP (healthcare-associated pneumonia)  UTI (urinary tract infection), Hematuria  Leukocytosis    Continuous Cardiac monitoring  Trend troponin, trended down  Oxygen prn  duonebs prn  IVFs   Pt was started on macrobid outpatient on 10/11.  Continued IV ceftriaxone and azithro  -UC multiple organisms in predominance, called micro lab and spoke with Yo Huff, will look at plate again.  Update 10/14 gram negative rods and proteus, continue ceftriaxone and azithro.  10/15 Proteus vulgaris resistant to ceftriaxone, changed to cefepime with improvement in WBC and improvement in mental status on 10/17. Needs 2 more days abx   -US retroperitoneal: Probable blood products in the urinary bladder.  A mass is not entirely excluded, follow-up is recommended. No hydronephrosis.   BC NGTD  Psychiatry following and recommended Neurology consult, will place.  neurochecks   -failed swallow study x 2, ST seeing again 10/17, 10/18  -Vit B12 722 appropriate, Vit D deficiency see below  -Palliative Care consulted    10/19  SLP recommends NPO   On anticholinergic for oral secretions   Patient and POA would like PEG tube  SLP and palliative care recommends against PEG tube   POA would like to make final decision with GI regarding PEG, understanding risks and benefits- see interval hx   GI consulted   Awaiting hospice/NH placement       10/20  GI and POA decided against PEG tube   Pleasure feeds only   Needs 1 more day of abx   Awaiting placement at nursing home/hospice

## 2022-10-20 NOTE — PROGRESS NOTES
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 Patient unable to provide any history. Per RN, patient normally oriented to self and now is somnolent.  ED findings:  WBCs 16.91, potassium 5.2, creatinine 2.2, troponin 0.120, EKG without ischemic changes, u/a concerning for infectious process,  chest x-ray showed a right basilar airspace opacity, covid pending, blood cultures pending.  Patient admitted to hospital medicine observation unit for further medical management.     Facility Medlist:  atorvastatin 80 mg qhs, finasteride 5 mg qhs, lisinopril 40 mg daily, melatonin 15 mg qhs, metoprolol ER 25 mg daily, MVI daily, spironolactone 25 mg qhs, thiamine 100 mg daily, duloxetine 40 mg daily, eliquis 5 mg bid daily, trazodone 25 mg daily, as needed mag hydroxide, as need maalox, as need tylenol 325 mg q 4 prn, as needed nitro      Overview/Hospital Course:  Pt admitted for AMS, pt sent from [facility].  PEC in place.  UA notable for infection, pt started on ceftriaxone.  Psychiatry consulted, PEC discontinued.  Noted baseline by caregiver described as "vegetative state" and at baseline oriented to person and situation.  It was recommended to hold lithium and haldol with ALICIA.  Depacon recommended however nationwide supply chain issue and no reasonable alternative.  Zyprexa prn agitation.  Folate/Thiamine/MVI.  RD consult as pt failed swallow assessment.  Neurology " consult for encephalopathy.  10/13 UC with multiple organisms none in predominance, micro lab will review plate again 10/13.  UC with proteus vulgaris resistant to ceftriaxone and changed to cefepime on 10/15.  Neurology consulted and suspect metabolic encephalopathy.  Nephrology agrees with D5 for treatment of hypernatremia with noted improvement.  ST consulted and recommended NPO.  Cardiology consulted for abnormal rhythm, telemetry reviewed and paced rhythm with underlying afib rate controlled with PVCs and bigeminy; no evidence of afib with RVR or VTach.  Continue IV Metoprolol for now with transition to oral BB at home dose when able to take po meds/fluids.  Palliative Care consulted and pt changed to DNR.      Interval History:  No major issues today. GI and POA decided against PEG tube.   Patient needs 1 more day of Abx. Patient asking for Food. OK for pleasure feeds only as patient will be discharged with Hospice.   Awaiting placement with nursing home/hospice.         Review of Systems   Unable to perform ROS: Mental status change   Objective:     Vital Signs (Most Recent):  Temp: 96.5 °F (35.8 °C) (10/20/22 0500)  Pulse: 73 (10/20/22 0825)  Resp: (!) 21 (10/20/22 0751)  BP: 127/72 (10/20/22 0751)  SpO2: 98 % (10/20/22 0825)   Vital Signs (24h Range):  Temp:  [96.2 °F (35.7 °C)-98.2 °F (36.8 °C)] 96.5 °F (35.8 °C)  Pulse:  [62-99] 73  Resp:  [18-21] 21  SpO2:  [96 %-100 %] 98 %  BP: (100-142)/(60-87) 127/72     Weight: 62 kg (136 lb 11 oz)  Body mass index is 21.41 kg/m².    Intake/Output Summary (Last 24 hours) at 10/20/2022 0909  Last data filed at 10/20/2022 0606  Gross per 24 hour   Intake 1660.15 ml   Output 800 ml   Net 860.15 ml        Physical Exam  HENT:      Head: Normocephalic and atraumatic.      Mouth/Throat:      Pharynx: Oropharynx is clear.   Eyes:      Conjunctiva/sclera: Conjunctivae normal.   Cardiovascular:      Rate and Rhythm: Normal rate. Rhythm irregular.      Pulses: Normal pulses.    Pulmonary:      Effort: Pulmonary effort is normal. No respiratory distress.      Breath sounds: Normal breath sounds. No wheezing.   Abdominal:      General: Bowel sounds are normal.      Palpations: Abdomen is soft.      Tenderness: There is no abdominal tenderness.   Musculoskeletal:         General: No deformity.      Cervical back: Neck supple.      Comments: Very weak grasp to bilateral hands   Skin:     General: Skin is warm and dry.   Neurological:      Mental Status: He is alert.      GCS: GCS eye subscore is 4. GCS verbal subscore is 5. GCS motor subscore is 6.      Motor: Weakness present.      Comments: Clearer speech today. Seems to understand and have near normal conversation. Speech garbled by secretions. Delayed responses noted.        Significant Labs: All pertinent labs within the past 24 hours have been reviewed.  Microbiology Results (last 7 days)       Procedure Component Value Units Date/Time    Blood culture [[id number]] Collected: 10/11/22 2330    Order Status: Completed Specimen: Blood from Peripheral, Hand, Right Updated: 10/17/22 0612     Blood Culture, Routine No growth after 5 days.    Blood culture [[id number]] Collected: 10/11/22 2340    Order Status: Completed Specimen: Blood from Peripheral, Hand, Left Updated: 10/17/22 0612     Blood Culture, Routine No growth after 5 days.    Culture, Respiratory with Gram Stain [[id number]] Collected: 10/14/22 2223    Order Status: Completed Specimen: Respiratory from Sputum Updated: 10/15/22 2112     Respiratory Culture Specimen inadequate - culture not performed     Gram Stain (Respiratory) >10epis/lfp and <than many WBC's     Gram Stain (Respiratory) Predominance of oropharyngeal xochitl. Please recollect.    Urine culture [[id number]]  (Abnormal)  (Susceptibility) Collected: 10/11/22 2109    Order Status: Completed Specimen: Urine Updated: 10/15/22 1105     Urine Culture, Routine PROTEUS VULGARIS  10,000 - 49,999 cfu/ml      Narrative:       Specimen Source->Urine  Previous value was Presumptive Proteus species verified by [email or web address] at   14:22 on 10/14/2022  Previous quantitation was 50,000 - 99,999 cfu/ml verified by [email or web address] at   14:22 on 10/14/2022  Previous comment was modified by [email or web address] at  14:22  on  10/14/2022             Significant Imaging: I have reviewed all pertinent imaging results/findings within the past 24 hours.      Assessment/Plan:      * Encephalopathy due to infection  HCAP (healthcare-associated pneumonia)  UTI (urinary tract infection), Hematuria  Leukocytosis    Continuous Cardiac monitoring  Trend troponin, trended down  Oxygen prn  duonebs prn  IVFs   Pt was started on macrobid outpatient on 10/11.  Continued IV ceftriaxone and azithro  -UC multiple organisms in predominance, called micro lab and spoke with Yo Huff, will look at plate again.  Update 10/14 gram negative rods and proteus, continue ceftriaxone and azithro.  10/15 Proteus vulgaris resistant to ceftriaxone, changed to cefepime with improvement in WBC and improvement in mental status on 10/17. Needs 2 more days abx   -US retroperitoneal: Probable blood products in the urinary bladder.  A mass is not entirely excluded, follow-up is recommended. No hydronephrosis.   BC NGTD  Psychiatry following and recommended Neurology consult, will place.  neurochecks   -failed swallow study x 2, ST seeing again 10/17, 10/18  -Vit B12 722 appropriate, Vit D deficiency see below  -Palliative Care consulted    10/19  SLP recommends NPO   On anticholinergic for oral secretions   Patient and POA would like PEG tube  SLP and palliative care recommends against PEG tube   POA would like to make final decision with GI regarding PEG, understanding risks and benefits- see interval hx   GI consulted   Awaiting hospice/NH placement       10/20  GI and POA decided against PEG tube   Pleasure feeds only   Needs 1 more day of abx   Awaiting placement at nursing home/hospice     Palliative care  encounter        Goals of care, counseling/discussion  Consulting palliative care  Advance Care Planning     Date: 10/17/2022     Code Status  In light of the patients advanced and life limiting illness,I engaged the the healthcare power of   in a conversation about the patient's preferences for care  at the very end of life. Per POA patient wishes to have a natural, peaceful death.  Along those lines, the patient does not wish to have CPR or other invasive treatments performed when his heart and/or breathing stops. I communicated to the healthcare power of   that a DNR order would be placed in his medical record to reflect this preference.   previous provider spent a total of 15 minutes engaging the patient in this advance care planning discussion.           Cardiac pacemaker in situ  Single chamber pacemaker 2020 due to syncope  Cards recs appreciated:  -paced rhythm with underlying afib rate controlled with PVCs and bigeminy; no evidence of afib with RVR or VTach   - monitor on telemetry while admitted  - continue IV Metoprolol for now with transition to oral BB at home dose when able to take po meds/fluids    BB held due to low normal BPs. Patient unable to tolerate PO; POA understands medications cannot be administered.     Progressive supranuclear palsy  Mri brain with atrophy of midbrain with relative preservation of zoie suggesting Progressive Supranuclear palsy diagnosis. Encephalopathy secondary to infectious/ metabolic deficits.   -Neuro recs PT/OT/ST.  Pt not appropriate for PT/OT     Hematuria        Vitamin D deficiency  Vit D 14, supplement held       Hypernatremia  Hypomagnesemia  Hypokalemia  Hypophosphatemia  Suspect hypovolemic hypernatremia however pt does have ALICIA and history of lithium use.  Will consult nephrology, possible diabetes insipidus  Lithium level 1.5 on 10/6/22, elevated  -Nephrology recs appreciated, agree with D5.  No NS.  Improved.  -Replacing electrolytes  IV    CAD (coronary artery disease)  Statin held.   NPO       Congestive heart failure  Appears euvolemic  Continuous cardiac monitoring  Trend troponins, trended down  Facility medlist: atorvastatin 80 mg qhs, lisinopril 40 mg daily, metoprolol succinated ER 25 mg daily, spironolactone 25 mg daily    Results for orders placed during the hospital encounter of 10/11/22    Echo    Interpretation Summary  · The left ventricle is normal in size with concentric remodeling and normal systolic function.  · The estimated ejection fraction is 55%.  · A diastolic pattern consistent with atrial fibrillation observed.  · Mild-to-moderate mitral regurgitation.  · There is mild aortic valve stenosis.  · Aortic valve area is 1.36 cm2; peak velocity is 2.16 m/s; mean gradient is 11 mmHg.  · Elevated central venous pressure (15 mmHg).  · The estimated PA systolic pressure is 45 mmHg.  · Mild tricuspid regurgitation.  · Normal right ventricular size with normal right ventricular systolic function.  · There is pulmonary hypertension.      History of CVA (cerebrovascular accident)  Facility medlist: atorvastatin 80 mg qhs, finasteride 5 mg qhs, lisinopril 40 mg daily, melatonin 15 mg qhs, metoprolol ER 25 mg daily, MVI daily, spironolactone 25 mg qhs, thiamine 100 mg daily, duloxetine 40 mg daily, eliquis 5 mg bid daily, trazodone 25 mg daily, as needed mag hydroxide, as need maalox, as need tylenol 325 mg q 4 prn, as needed nitro  -failed swallow study, oral meds on hold  -heparin drip initiated - will be stopped at discharge       History of pulmonary embolism  No signs of respiratory distress  Monitor  Start low intensity heparin as failed swallow assessment, eliquis on hold       Hypertension  Stable, see medlist above      A-fib  Heart rate controlled  Continuous cardiac monitoring  [id number]  Pt was on eliquis 5 mg bid, failed swallow study.  Start low intensity heparin drip.  Cards eval appreciated: continue IV Metoprolol for  now; transition back to oral Metoprolol when able to tolerate po meds     Results for orders placed during the hospital encounter of 10/11/22    Echo    Interpretation Summary  · The left ventricle is normal in size with concentric remodeling and normal systolic function.  · The estimated ejection fraction is 55%.  · A diastolic pattern consistent with atrial fibrillation observed.  · Mild-to-moderate mitral regurgitation.  · There is mild aortic valve stenosis.  · Aortic valve area is 1.36 cm2; peak velocity is 2.16 m/s; mean gradient is 11 mmHg.  · Elevated central venous pressure (15 mmHg).  · The estimated PA systolic pressure is 45 mmHg.   · Mild tricuspid regurgitation.  · Normal right ventricular size with normal right ventricular systolic function.  · There is pulmonary hypertension.      BP low normal, IV metoprolol held x multiple doses       Bipolar disorder        Suicidal ideation  Bipolar disorder    Transferred from [facility] due to medical issues  Stable  PEC on admission  Consult Psychiatry for continued management   Psych recommended d/c PEC      Leukocytosis        Elevated troponin  Continuous cardiac monitoring  ekg without ischemic changes   Troponin trended down      UTI (urinary tract infection)        HCAP (healthcare-associated pneumonia)          VTE Risk Mitigation (From admission, onward)         Ordered     heparin 25,000 units in dextrose 5% (100 units/ml) IV bolus from bag - ADDITIONAL PRN BOLUS - 60 units/kg  As needed (PRN)        Question:  Heparin Infusion Adjustment (DO NOT MODIFY ANSWER)  Answer:  [email or web address]\epic\Images\Pharmacy\HeparinInfusions\heparin LOW INTENSITY nomogram for [facility] [id number].pdf    10/13/22 1326     heparin 25,000 units in dextrose 5% (100 units/ml) IV bolus from bag - ADDITIONAL PRN BOLUS - 30 units/kg  As needed (PRN)        Question:  Heparin Infusion Adjustment (DO NOT MODIFY ANSWER)  Answer:   \\[email or web address]\epic\Images\Pharmacy\HeparinInfusions\heparin LOW INTENSITY nomogram for [facility] [id number].pdf    10/13/22 1326     heparin 25,000 units in dextrose 5% 250 mL (100 units/mL) infusion LOW INTENSITY nomogram - [facility]  Continuous        Question Answer Comment   Heparin Infusion Adjustment (DO NOT MODIFY ANSWER) \\[email or web address]\epic\Images\Pharmacy\HeparinInfusions\heparin LOW INTENSITY nomogram for [facility] [id number].pdf    Begin at (in units/kg/hr) 12        10/13/22 1326                Discharge Planning   JAYSON: 10/20/2022     Code Status: DNR   Is the patient medically ready for discharge?:     Reason for patient still in hospital (select all that apply): Patient trending condition  Discharge Plan A: Hospice/home                  Radha Santos NP  Department of [facility] Medicine   [facility]

## 2022-10-20 NOTE — PLAN OF CARE
[facility]     Department of Hospital Medicine     [address] [place] [address]     [phone] after hours  [phone] fax       NURSING HOME ORDERS/Hospice     10/20/2022    Admit to Nursing Home:  Regular Bed       Code status: DNR   Diagnoses:  Active Hospital Problems    Diagnosis  POA    *Encephalopathy due to infection [G93.49, B99.9]  Yes    Cardiac pacemaker in situ [Z95.0]  Yes    Goals of care, counseling/discussion [Z71.89]  Not Applicable    Palliative care encounter [Z51.5]  Not Applicable    Progressive supranuclear palsy [G23.1]  Unknown    Hematuria [R31.9]  Yes    Vitamin D deficiency [E55.9]  Unknown    Hypernatremia [E87.0]  Unknown    HCAP (healthcare-associated pneumonia) [J18.9]  Yes    UTI (urinary tract infection) [N39.0]  Yes    Elevated troponin [R77.8]  Yes    Leukocytosis [D72.829]  Yes    Suicidal ideation [R45.851]  Not Applicable    Bipolar disorder [F31.9]  Yes    A-fib [I48.91]  Yes    Hypertension [I10]  Yes    History of pulmonary embolism [Z86.711]  Yes    History of CVA (cerebrovascular accident) [Z86.73]  Not Applicable    Congestive heart failure [I50.9]  Yes    CAD (coronary artery disease) [I25.10]  Yes      Resolved Hospital Problems   No resolved problems to display.       Patient is homebound due to:  Encephalopathy due to infection    Allergies:Review of patient's allergies indicates:  No Known Allergies    Vitals:    Diet:  pleasure feeds only     Acitivities:   turn q 2 hours                        Elevate heels     LABS:      Nursing Precautions:     - Aspiration precautions:             - Total assistance with meals            -  Upright 90 degrees befor during and after meals             -  Suction at bedside          - Fall precautions per nursing home protocol   - Seizure precaution per group home protocol   - Decubitus precautions:        -  for positioning   - Pressure reducing foam mattress   -  Turn patient every two hours. Use wedge pillows to anchor patient    CONSULTS:       MISCELLANEOUS CARE:       Torres Care: Empty Torres bag every shift.  Change Torres every month     Routine Skin for Bedridden Patients:  Apply moisture barrier cream to all    skin folds and wet areas in perineal area daily and after baths and                           all bowel movements.           Medications: as per hospice            Medication List        CONTINUE taking these medications      acetaminophen 650 MG Tbsr  Commonly known as: TYLENOL  Take 650 mg by mouth 4 (four) times daily as needed.     aluminum-magnesium hydroxide-simethicone 200-200-20 mg/5 mL Susp  Commonly known as: MAALOX  Take 30 mLs by mouth every 4 (four) hours as needed (GI upset).     apixaban 5 mg Tab  Commonly known as: ELIQUIS  Take 5 mg by mouth 2 (two) times daily.     atorvastatin 80 MG tablet  Commonly known as: LIPITOR  Take 80 mg by mouth once daily.     DULoxetine 20 MG capsule  Commonly known as: CYMBALTA  Take 40 mg by mouth once daily.     finasteride 5 mg tablet  Commonly known as: PROSCAR  Take 5 mg by mouth once daily.     magnesium hydroxide 400 mg/5 ml 400 mg/5 mL Susp  Commonly known as: MILK OF MAGNESIA  Take 30 mLs by mouth daily as needed (constipation).     melatonin 5 mg Tbdl  Take 15 mg by mouth every evening.     metoprolol succinate 25 MG 24 hr tablet  Commonly known as: TOPROL-XL  Take 25 mg by mouth once daily.     multivitamin per tablet  Commonly known as: THERAGRAN  Take 1 tablet by mouth once daily.     nitroGLYCERIN 0.4 MG SL tablet  Commonly known as: NITROSTAT  Place 0.4 mg under the tongue every 5 (five) minutes as needed for Chest pain.     thiamine 100 MG tablet  Take 100 mg by mouth once daily.     traZODone 50 MG tablet  Commonly known as: DESYREL  Take 25 mg by mouth every evening.            STOP taking these medications      lisinopriL 40 MG tablet  Commonly known as: PRINIVIL,ZESTRIL     spironolactone 25 MG  tablet  Commonly known as: ALDACTONE                    _________________________________  Radha Santos, MULUGETA  10/20/2022

## 2022-10-20 NOTE — ASSESSMENT & PLAN NOTE
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 Encephalopathy secondary to infectious/ metabolic deficits.   -Neuro recs PT/OT/ST.  Pt not appropriate for PT/OT

## 2022-10-20 NOTE — ASSESSMENT & PLAN NOTE
Bipolar disorder    Transferred from [facility] due to medical issues  Stable  PEC on admission  Consult Psychiatry for continued management   Psych recommended d/c PEC

## 2022-10-20 NOTE — DISCHARGE SUMMARY
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 Patient unable to provide any history. Per RN, patient normally oriented to self and now is somnolent.  ED findings:  WBCs 16.91, potassium 5.2, creatinine 2.2, troponin 0.120, EKG without ischemic changes, u/a concerning for infectious process,  chest x-ray showed a right basilar airspace opacity, covid pending, blood cultures pending.  Patient admitted to hospital medicine observation unit for further medical management.     Facility Medlist:  atorvastatin 80 mg qhs, finasteride 5 mg qhs, lisinopril 40 mg daily, melatonin 15 mg qhs, metoprolol ER 25 mg daily, MVI daily, spironolactone 25 mg qhs, thiamine 100 mg daily, duloxetine 40 mg daily, eliquis 5 mg bid daily, trazodone 25 mg daily, as needed mag hydroxide, as need maalox, as need tylenol 325 mg q 4 prn, as needed nitro      * No surgery found *      Hospital Course:   Pt admitted for AMS, pt sent from [facility].  PEC in place.  UA notable for infection, pt started on ceftriaxone.  Psychiatry consulted, PEC discontinued.  Noted baseline by caregiver described as "vegetative state" and at baseline oriented to person and situation.  It was recommended to hold lithium and haldol with ALICIA.  Depacon recommended however nationwide supply chain issue and no reasonable alternative.  Zyprexa prn agitation.  Folate/Thiamine/MVI.  RD consult as pt failed swallow " assessment.  Neurology consult for encephalopathy.  10/13 UC with multiple organisms none in predominance, micro lab will review plate again 10/13.  UC with proteus vulgaris resistant to ceftriaxone and changed to cefepime on 10/15.  Neurology consulted and suspect metabolic encephalopathy.  Nephrology agrees with D5 for treatment of hypernatremia with noted improvement.  ST consulted and recommended NPO.  Cardiology consulted for abnormal rhythm, telemetry reviewed and paced rhythm with underlying afib rate controlled with PVCs and bigeminy; no evidence of afib with RVR or VTach.  Continue IV Metoprolol for now with transition to oral BB at home dose when able to take po meds/fluids.  Palliative Care consulted and pt changed to DNR.       Goals of Care Treatment Preferences:  Code Status: DNR    Health care agent: Jc Melgar  [facility] agent number: No value filed.    Living Will: Yes     What is most important right now is to focus on remaining as independent as possible, quality of life, even if it means sacrificing a little time, comfort and QOL .  Accordingly, we have decided that the best plan to meet the patient's goals includes enrolling in hospice care.      Consults:   Consults (From admission, onward)        Status Ordering Provider     Inpatient consult to Gastroenterology-[facility]  Once        Provider:  (Not yet assigned)    Completed TERA BRONSON     Inpatient consult to Palliative Care  Once        Provider:  (Not yet assigned)    Completed DREW OLIVAS     Inpatient consult to Cardiology-[facility]  Once        Provider:  (Not yet assigned)    Completed DREW OLIVAS     Inpatient consult to Nephrology-[facility] (Cherri Mcneal Nimkevych)  Once        Provider:  (Not yet assigned)    Completed DREW OLIVAS     Inpatient consult to U Neurology  Once        Provider:  Matthew Amaro MD    Completed DREW OLIVAS     Inpatient consult to Registered  Dietitian/Nutritionist  Once        Provider:  (Not yet assigned)    Completed DREW OLIVAS     Inpatient consult to Social Work  Once        Provider:  (Not yet assigned)    Acknowledged DUTCH NAVARRO     Inpatient consult to Psychiatry  Once        Provider:  (Not yet assigned)    Completed ELLEN RAI          * Encephalopathy due to infection  HCAP (healthcare-associated pneumonia)  UTI (urinary tract infection), Hematuria  Leukocytosis    Continuous Cardiac monitoring  Trend troponin, trended down  Oxygen prn  duonebs prn  IVFs   Pt was started on macrobid outpatient on 10/11.  Continued IV ceftriaxone and azithro  -UC multiple organisms in predominance, called micro lab and spoke with Yo Huff, will look at plate again.  Update 10/14 gram negative rods and proteus, continue ceftriaxone and azithro.  10/15 Proteus vulgaris resistant to ceftriaxone, changed to cefepime with improvement in WBC and improvement in mental status on 10/17. Needs 2 more days abx   -US retroperitoneal: Probable blood products in the urinary bladder.  A mass is not entirely excluded, follow-up is recommended. No hydronephrosis.   BC NGTD  Psychiatry following and recommended Neurology consult, will place.  neurochecks   -failed swallow study x 2, ST seeing again 10/17, 10/18  -Vit B12 722 appropriate, Vit D deficiency see below  -Palliative Care consulted    10/19  SLP recommends NPO   On anticholinergic for oral secretions   Patient and POA would like PEG tube  SLP and palliative care recommends against PEG tube   POA would like to make final decision with GI regarding PEG, understanding risks and benefits- see interval hx   GI consulted   Awaiting hospice/NH placement       10/20  GI and POA decided against PEG tube   Pleasure feeds only   Needs 1 more day of abx   Awaiting placement at nursing home/hospice     Palliative care encounter        Goals of care, counseling/discussion  Consulting palliative care  Advance Care  Planning     Date: 10/17/2022     Code Status  In light of the patients advanced and life limiting illness,I engaged the the healthcare power of   in a conversation about the patient's preferences for care  at the very end of life. Per POA patient wishes to have a natural, peaceful death.  Along those lines, the patient does not wish to have CPR or other invasive treatments performed when his heart and/or breathing stops. I communicated to the healthcare power of   that a DNR order would be placed in his medical record to reflect this preference.   previous provider spent a total of 15 minutes engaging the patient in this advance care planning discussion.           Cardiac pacemaker in situ  Single chamber pacemaker 2020 due to syncope  Cards recs appreciated:  -paced rhythm with underlying afib rate controlled with PVCs and bigeminy; no evidence of afib with RVR or VTach   - monitor on telemetry while admitted  - continue IV Metoprolol for now with transition to oral BB at home dose when able to take po meds/fluids    BB held due to low normal BPs. Patient unable to tolerate PO; POA understands medications cannot be administered.     Progressive supranuclear palsy  Mri brain with atrophy of midbrain with relative preservation of zoie suggesting Progressive Supranuclear palsy diagnosis. Encephalopathy secondary to infectious/ metabolic deficits.   -Neuro recs PT/OT/ST.  Pt not appropriate for PT/OT     Hematuria        Vitamin D deficiency  Vit D 14, supplement held       Hypernatremia  Hypomagnesemia  Hypokalemia  Hypophosphatemia  Suspect hypovolemic hypernatremia however pt does have ALICIA and history of lithium use.  Will consult nephrology, possible diabetes insipidus  Lithium level 1.5 on 10/6/22, elevated  -Nephrology recs appreciated, agree with D5.  No NS.  Improved.  -Replacing electrolytes IV    CAD (coronary artery disease)  Statin held.   NPO       Congestive heart failure  Appears  euvolemic  Continuous cardiac monitoring  Trend troponins, trended down  Facility medlist: atorvastatin 80 mg qhs, lisinopril 40 mg daily, metoprolol succinated ER 25 mg daily, spironolactone 25 mg daily    Results for orders placed during the hospital encounter of 10/11/22    Echo    Interpretation Summary  · The left ventricle is normal in size with concentric remodeling and normal systolic function.  · The estimated ejection fraction is 55%.  · A diastolic pattern consistent with atrial fibrillation observed.  · Mild-to-moderate mitral regurgitation.  · There is mild aortic valve stenosis.  · Aortic valve area is 1.36 cm2; peak velocity is 2.16 m/s; mean gradient is 11 mmHg.  · Elevated central venous pressure (15 mmHg).  · The estimated PA systolic pressure is 45 mmHg.  · Mild tricuspid regurgitation.  · Normal right ventricular size with normal right ventricular systolic function.  · There is pulmonary hypertension.      History of CVA (cerebrovascular accident)  Facility medlist: atorvastatin 80 mg qhs, finasteride 5 mg qhs, lisinopril 40 mg daily, melatonin 15 mg qhs, metoprolol ER 25 mg daily, MVI daily, spironolactone 25 mg qhs, thiamine 100 mg daily, duloxetine 40 mg daily, eliquis 5 mg bid daily, trazodone 25 mg daily, as needed mag hydroxide, as need maalox, as need tylenol 325 mg q 4 prn, as needed nitro  -failed swallow study, oral meds on hold  -heparin drip initiated - will be stopped at discharge       History of pulmonary embolism  No signs of respiratory distress  Monitor  Start low intensity heparin as failed swallow assessment, eliquis on hold       Hypertension  Stable, see medlist above      A-fib  Heart rate controlled  Continuous cardiac monitoring  [id number]  Pt was on eliquis 5 mg bid, failed swallow study.  Start low intensity heparin drip.  Cards eval appreciated: continue IV Metoprolol for now; transition back to oral Metoprolol when able to tolerate po meds     Results for orders  placed during the hospital encounter of 10/11/22    Echo    Interpretation Summary  · The left ventricle is normal in size with concentric remodeling and normal systolic function.  · The estimated ejection fraction is 55%.  · A diastolic pattern consistent with atrial fibrillation observed.  · Mild-to-moderate mitral regurgitation.  · There is mild aortic valve stenosis.  · Aortic valve area is 1.36 cm2; peak velocity is 2.16 m/s; mean gradient is 11 mmHg.  · Elevated central venous pressure (15 mmHg).  · The estimated PA systolic pressure is 45 mmHg.   · Mild tricuspid regurgitation.  · Normal right ventricular size with normal right ventricular systolic function.  · There is pulmonary hypertension.      BP low normal, IV metoprolol held x multiple doses       Bipolar disorder        Suicidal ideation  Bipolar disorder    Transferred from [facility] due to medical issues  Stable  PEC on admission  Consult Psychiatry for continued management   Psych recommended d/c PEC      Leukocytosis        Elevated troponin  Continuous cardiac monitoring  ekg without ischemic changes   Troponin trended down      UTI (urinary tract infection)        HCAP (healthcare-associated pneumonia)          Final Active Diagnoses:    Diagnosis Date Noted POA    PRINCIPAL PROBLEM:  Encephalopathy due to infection [G93.49, B99.9] 10/12/2022 Yes    Cardiac pacemaker in situ [Z95.0] 10/17/2022 Yes    Goals of care, counseling/discussion [Z71.89] 10/17/2022 Not Applicable    Palliative care encounter [Z51.5] 10/17/2022 Not Applicable    Progressive supranuclear palsy [G23.1] 10/16/2022 Unknown    Hematuria [R31.9] 10/15/2022 Yes    Vitamin D deficiency [E55.9] 10/14/2022 Unknown    Hypernatremia [E87.0] 10/13/2022 Unknown    HCAP (healthcare-associated pneumonia) [J18.9] 10/12/2022 Yes    UTI (urinary tract infection) [N39.0] 10/12/2022 Yes    Elevated troponin [R77.8] 10/12/2022 Yes    Leukocytosis [D72.829] 10/12/2022  Yes    Suicidal ideation [R45.851] 10/12/2022 Not Applicable    Bipolar disorder [F31.9] 10/12/2022 Yes    A-fib [I48.91] 10/12/2022 Yes    Hypertension [I10] 10/12/2022 Yes    History of pulmonary embolism [Z86.711] 10/12/2022 Yes    History of CVA (cerebrovascular accident) [Z86.73] 10/12/2022 Not Applicable    Congestive heart failure [I50.9] 10/12/2022 Yes    CAD (coronary artery disease) [I25.10] 10/12/2022 Yes      Problems Resolved During this Admission:       Discharged Condition: poor    Disposition: Hospice/Medical Facility    Follow Up:   Follow-up Information     [facility]. Go to.    Why: RETURN TO NURSING HOME  Contact information:  [facility] [place] [address]  [phone]                       Patient Instructions:      Diet NPO   Order Comments: Pleasure feeds only     Notify your health care provider if you experience any of the following:  temperature >100.4     Notify your health care provider if you experience any of the following:  persistent nausea and vomiting or diarrhea     Notify your health care provider if you experience any of the following:  severe uncontrolled pain     Notify your health care provider if you experience any of the following:  redness, tenderness, or signs of infection (pain, swelling, redness, odor or green/yellow discharge around incision site)     Notify your health care provider if you experience any of the following:  difficulty breathing or increased cough     Notify your health care provider if you experience any of the following:  severe persistent headache     Notify your health care provider if you experience any of the following:  worsening rash     Notify your health care provider if you experience any of the following:  persistent dizziness, light-headedness, or visual disturbances     Notify your health care provider if you experience any of the following:  increased confusion or weakness     Activity as tolerated        Significant Diagnostic Studies: Labs:   BMP:   Recent Labs   Lab 10/19/22  0301 10/20/22  0247   * 139*    132*   K 3.7 4.5    108   CO2 19* 16*   BUN 11 12   CREATININE 1.0 1.3   CALCIUM 9.3 9.1    and CMP   Recent Labs   Lab 10/19/22  0301 10/20/22  0247    132*   K 3.7 4.5    108   CO2 19* 16*   * 139*   BUN 11 12   CREATININE 1.0 1.3   CALCIUM 9.3 9.1   ALBUMIN 2.5* 2.4*   ANIONGAP 8 8       Pending Diagnostic Studies:     Procedure Component Value Units Date/Time    COVID-19 Routine Screening [[id number]] Collected: 10/12/22 0449    Order Status: Sent Lab Status: In process Updated: 10/12/22 0453    Specimen: Nasopharyngeal     EKG 12-lead [[id number]] Collected: 10/20/22 0407    Order Status: Sent Lab Status: In process Updated: 10/20/22 0932    Narrative:      Test Reason : R94.31,    Vent. Rate : 069 BPM     Atrial Rate : 069 BPM     P-R Int : 000 ms          QRS Dur : 136 ms      QT Int : 452 ms       P-R-T Axes : 000 -77 -64 degrees     QTc Int : 484 ms    Demand pacemaker;  interpretation is based on intrinsic rhythm  Undetermined rhythm  Left axis deviation  Nonspecific intraventricular block  Cannot rule out Septal infarct (cited on or before 20-OCT-2022)  Marked T wave abnormality, consider anterolateral ischemia  Abnormal ECG  When compared with ECG of 20-OCT-2022 04:05,  Current undetermined rhythm precludes rhythm comparison, needs review  Serial changes of evolving Septal infarct Present    Referred By: [email or web address]   SELF           Confirmed By:     Niacin (vitamin B3) [[id number]] Collected: 10/14/22 0321    Order Status: Sent Lab Status: In process Updated: 10/14/22 1002    Specimen: Blood          Medications:  Reconciled Home Medications:as per hospice          Medication List      CONTINUE taking these medications    acetaminophen 650 MG Tbsr  Commonly known as: TYLENOL  Take 650 mg by mouth 4 (four) times daily as needed.     aluminum-magnesium  hydroxide-simethicone 200-200-20 mg/5 mL Susp  Commonly known as: MAALOX  Take 30 mLs by mouth every 4 (four) hours as needed (GI upset).     apixaban 5 mg Tab  Commonly known as: ELIQUIS  Take 5 mg by mouth 2 (two) times daily.     atorvastatin 80 MG tablet  Commonly known as: LIPITOR  Take 80 mg by mouth once daily.     DULoxetine 20 MG capsule  Commonly known as: CYMBALTA  Take 40 mg by mouth once daily.     finasteride 5 mg tablet  Commonly known as: PROSCAR  Take 5 mg by mouth once daily.     magnesium hydroxide 400 mg/5 ml 400 mg/5 mL Susp  Commonly known as: MILK OF MAGNESIA  Take 30 mLs by mouth daily as needed (constipation).     melatonin 5 mg Tbdl  Take 15 mg by mouth every evening.     metoprolol succinate 25 MG 24 hr tablet  Commonly known as: TOPROL-XL  Take 25 mg by mouth once daily.     multivitamin per tablet  Commonly known as: THERAGRAN  Take 1 tablet by mouth once daily.     nitroGLYCERIN 0.4 MG SL tablet  Commonly known as: NITROSTAT  Place 0.4 mg under the tongue every 5 (five) minutes as needed for Chest pain.     thiamine 100 MG tablet  Take 100 mg by mouth once daily.     traZODone 50 MG tablet  Commonly known as: DESYREL  Take 25 mg by mouth every evening.        STOP taking these medications    lisinopriL 40 MG tablet  Commonly known as: PRINIVIL,ZESTRIL     spironolactone 25 MG tablet  Commonly known as: ALDACTONE            Indwelling Lines/Drains at time of discharge:   Lines/Drains/Airways     Drain  Duration           Male External Urinary Catheter 10/13/22 0830 Small 7 days                Time spent on the discharge of patient: 30 minutes         Radha Santos NP  Department of Hospital Medicine  [facility]

## 2022-10-20 NOTE — ASSESSMENT & PLAN NOTE
HCAP (healthcare-associated pneumonia)  UTI (urinary tract infection), Hematuria  Leukocytosis    Continuous Cardiac monitoring  Trend troponin, trended down  Oxygen prn  duonebs prn  IVFs   Pt was started on macrobid outpatient on 10/11.  Continued IV ceftriaxone and azithro  -UC multiple organisms in predominance, called micro lab and spoke with oY Huff, will look at plate again.  Update 10/14 gram negative rods and proteus, continue ceftriaxone and azithro.  10/15 Proteus vulgaris resistant to ceftriaxone, changed to cefepime with improvement in WBC and improvement in mental status on 10/17. Needs 2 more days abx   -US retroperitoneal: Probable blood products in the urinary bladder.  A mass is not entirely excluded, follow-up is recommended. No hydronephrosis.   BC NGTD  Psychiatry following and recommended Neurology consult, will place.  neurochecks   -failed swallow study x 2, ST seeing again 10/17, 10/18  -Vit B12 722 appropriate, Vit D deficiency see below  -Palliative Care consulted    10/19  SLP recommends NPO   On anticholinergic for oral secretions   Patient and POA would like PEG tube  SLP and palliative care recommends against PEG tube   POA would like to make final decision with GI regarding PEG, understanding risks and benefits- see interval hx   GI consulted   Awaiting hospice/NH placement       10/20  GI and POA decided against PEG tube   Pleasure feeds only   Needs 1 more day of abx   Awaiting placement at nursing home/hospice

## 2022-10-20 NOTE — ASSESSMENT & PLAN NOTE
Heart rate controlled  Continuous cardiac monitoring  [id number]  Pt was on eliquis 5 mg bid, failed swallow study.  Start low intensity heparin drip.  Cards eval appreciated: continue IV Metoprolol for now; transition back to oral Metoprolol when able to tolerate po meds     Results for orders placed during the hospital encounter of 10/11/22    Echo    Interpretation Summary  · The left ventricle is normal in size with concentric remodeling and normal systolic function.  · The estimated ejection fraction is 55%.  · A diastolic pattern consistent with atrial fibrillation observed.  · Mild-to-moderate mitral regurgitation.  · There is mild aortic valve stenosis.  · Aortic valve area is 1.36 cm2; peak velocity is 2.16 m/s; mean gradient is 11 mmHg.  · Elevated central venous pressure (15 mmHg).  · The estimated PA systolic pressure is 45 mmHg.   · Mild tricuspid regurgitation.  · Normal right ventricular size with normal right ventricular systolic function.  · There is pulmonary hypertension.      BP low normal, IV metoprolol held x multiple doses

## 2022-10-20 NOTE — PLAN OF CARE
[facility] - Telemetry  Discharge Final Note    Primary Care Provider: Primary Doctor No    Expected Discharge Date: 10/20/2022    Pharmacist will go over home medications and reasons for medications. VN and bedside nurse to reiterate final discharge instructions.       Final Discharge Note (most recent)       Final Note - 10/20/22 0954          Final Note    Assessment Type Final Discharge Note (P)      Anticipated Discharge Disposition Hospice/Home (P)      Hospital Resources/Appts/Education Provided Post-Acute resouces added to AVS (P)         Post-Acute Status    Post-Acute Authorization Placement;Hospice (P)      Post-Acute Placement Status Pending post-acute provider review/more information requested (P)    Orders sent to facility to review. PENDING ORDER ACCEPTANCE AND REPORT INFORMATION.    Discharge Delays PFC Arranged Transportation (P)    Ambulance transport requested for 12 pm .                    Important Message from Medicare             Contact Info       [facility]    [address] [address]   Phone: [phone]       Next Steps: Go to    Instructions: RETURN TO NURSING HOME    [facility]   Specialty: Home Hospice, Respite Care    [address]  [place] [address]   Phone: [phone]       Next Steps: Call    Instructions: As needed, HOSPICE, NURSING HOME RETURN             Medication List        CONTINUE taking these medications      acetaminophen 650 MG Tbsr  Commonly known as: TYLENOL     aluminum-magnesium hydroxide-simethicone 200-200-20 mg/5 mL Susp  Commonly known as: MAALOX     apixaban 5 mg Tab  Commonly known as: ELIQUIS     atorvastatin 80 MG tablet  Commonly known as: LIPITOR     DULoxetine 20 MG capsule  Commonly known as: CYMBALTA     finasteride 5 mg tablet  Commonly known as: PROSCAR     magnesium hydroxide 400 mg/5 ml 400 mg/5 mL Susp  Commonly known as: MILK OF MAGNESIA     melatonin 5 mg Tbdl     metoprolol succinate 25 MG 24  hr tablet  Commonly known as: TOPROL-XL     multivitamin per tablet  Commonly known as: THERAGRAN     nitroGLYCERIN 0.4 MG SL tablet  Commonly known as: NITROSTAT     thiamine 100 MG tablet     traZODone 50 MG tablet  Commonly known as: DESYREL            STOP taking these medications      lisinopriL 40 MG tablet  Commonly known as: PRINIVIL,ZESTRIL     spironolactone 25 MG tablet  Commonly known as: ALDACTONE

## 2022-10-20 NOTE — ASSESSMENT & PLAN NOTE
Single chamber pacemaker 2020 due to syncope  Cards recs appreciated:  -paced rhythm with underlying afib rate controlled with PVCs and bigeminy; no evidence of afib with RVR or VTach   - monitor on telemetry while admitted  - continue IV Metoprolol for now with transition to oral BB at home dose when able to take po meds/fluids    BB held due to low normal BPs. Patient unable to tolerate PO; POA understands medications cannot be administered.

## 2022-11-01 LAB
NIACIN SERPL-MCNC: NORMAL NG/ML
NICOTINAMIDE SERPL-MCNC: 12.6 NG/ML (ref 5–48)
NICOTINURATE SERPL-MCNC: NORMAL NG/ML

## 2023-01-16 PROBLEM — N39.0 UTI (URINARY TRACT INFECTION): Status: RESOLVED | Noted: 2022-10-12 | Resolved: 2023-01-16

## 2023-01-16 PROBLEM — J18.9 HCAP (HEALTHCARE-ASSOCIATED PNEUMONIA): Status: RESOLVED | Noted: 2022-10-12 | Resolved: 2023-01-16
